# Patient Record
Sex: MALE | Race: WHITE | NOT HISPANIC OR LATINO | Employment: OTHER | URBAN - METROPOLITAN AREA
[De-identification: names, ages, dates, MRNs, and addresses within clinical notes are randomized per-mention and may not be internally consistent; named-entity substitution may affect disease eponyms.]

---

## 2023-03-27 ENCOUNTER — TELEPHONE (OUTPATIENT)
Dept: PRIMARY CARE | Facility: CLINIC | Age: 78
End: 2023-03-27
Payer: COMMERCIAL

## 2023-03-27 DIAGNOSIS — F41.1 GENERALIZED ANXIETY DISORDER: Primary | ICD-10-CM

## 2023-03-27 RX ORDER — CLONAZEPAM 0.5 MG/1
1 TABLET ORAL 2 TIMES DAILY
COMMUNITY
Start: 2019-04-26 | End: 2023-03-27 | Stop reason: SDUPTHER

## 2023-03-27 RX ORDER — CLONAZEPAM 0.5 MG/1
0.5 TABLET ORAL 2 TIMES DAILY
Qty: 60 TABLET | Refills: 0 | Status: SHIPPED | OUTPATIENT
Start: 2023-03-27 | End: 2023-04-26 | Stop reason: SDUPTHER

## 2023-04-26 DIAGNOSIS — F41.1 GENERALIZED ANXIETY DISORDER: ICD-10-CM

## 2023-04-26 RX ORDER — CLONAZEPAM 0.5 MG/1
0.5 TABLET ORAL 2 TIMES DAILY
Qty: 60 TABLET | Refills: 0 | Status: SHIPPED | OUTPATIENT
Start: 2023-04-26 | End: 2023-05-24 | Stop reason: SDUPTHER

## 2023-05-11 LAB
ALBUMIN (G/DL) IN SER/PLAS: 4 G/DL (ref 3.4–5)
ANION GAP IN SER/PLAS: 13 MMOL/L (ref 10–20)
CALCIUM (MG/DL) IN SER/PLAS: 9.6 MG/DL (ref 8.6–10.3)
CARBON DIOXIDE, TOTAL (MMOL/L) IN SER/PLAS: 29 MMOL/L (ref 21–32)
CHLORIDE (MMOL/L) IN SER/PLAS: 105 MMOL/L (ref 98–107)
CREATININE (MG/DL) IN SER/PLAS: 0.92 MG/DL (ref 0.5–1.3)
GFR MALE: 85 ML/MIN/1.73M2
GLUCOSE (MG/DL) IN SER/PLAS: 138 MG/DL (ref 74–99)
PHOSPHATE (MG/DL) IN SER/PLAS: 3.4 MG/DL (ref 2.5–4.9)
POTASSIUM (MMOL/L) IN SER/PLAS: 4.8 MMOL/L (ref 3.5–5.3)
SODIUM (MMOL/L) IN SER/PLAS: 142 MMOL/L (ref 136–145)
UREA NITROGEN (MG/DL) IN SER/PLAS: 13 MG/DL (ref 6–23)

## 2023-05-12 ENCOUNTER — OFFICE VISIT (OUTPATIENT)
Dept: PRIMARY CARE | Facility: CLINIC | Age: 78
End: 2023-05-12
Payer: COMMERCIAL

## 2023-05-12 VITALS
BODY MASS INDEX: 39.7 KG/M2 | HEIGHT: 66 IN | DIASTOLIC BLOOD PRESSURE: 77 MMHG | SYSTOLIC BLOOD PRESSURE: 122 MMHG | HEART RATE: 77 BPM | TEMPERATURE: 97.6 F | WEIGHT: 247 LBS

## 2023-05-12 DIAGNOSIS — E66.01 CLASS 2 SEVERE OBESITY DUE TO EXCESS CALORIES WITH SERIOUS COMORBIDITY AND BODY MASS INDEX (BMI) OF 39.0 TO 39.9 IN ADULT (MULTI): ICD-10-CM

## 2023-05-12 DIAGNOSIS — F41.1 GENERALIZED ANXIETY DISORDER: ICD-10-CM

## 2023-05-12 DIAGNOSIS — I25.10 ATHSCL HEART DISEASE OF NATIVE CORONARY ARTERY W/O ANG PCTRS: ICD-10-CM

## 2023-05-12 DIAGNOSIS — G40.909 NONINTRACTABLE EPILEPSY WITHOUT STATUS EPILEPTICUS, UNSPECIFIED EPILEPSY TYPE (MULTI): Primary | ICD-10-CM

## 2023-05-12 DIAGNOSIS — R60.0 BILATERAL LEG EDEMA: ICD-10-CM

## 2023-05-12 DIAGNOSIS — I10 BENIGN ESSENTIAL HYPERTENSION: ICD-10-CM

## 2023-05-12 PROBLEM — K21.9 GERD WITHOUT ESOPHAGITIS: Status: ACTIVE | Noted: 2023-05-12

## 2023-05-12 PROBLEM — E66.812 CLASS 2 SEVERE OBESITY DUE TO EXCESS CALORIES WITH SERIOUS COMORBIDITY AND BODY MASS INDEX (BMI) OF 39.0 TO 39.9 IN ADULT: Status: ACTIVE | Noted: 2023-05-12

## 2023-05-12 PROBLEM — Z98.61 CAD S/P PERCUTANEOUS CORONARY ANGIOPLASTY: Status: ACTIVE | Noted: 2023-05-12

## 2023-05-12 PROBLEM — N40.0 BENIGN PROSTATIC HYPERPLASIA: Status: ACTIVE | Noted: 2023-05-12

## 2023-05-12 PROCEDURE — 1036F TOBACCO NON-USER: CPT | Performed by: INTERNAL MEDICINE

## 2023-05-12 PROCEDURE — 3074F SYST BP LT 130 MM HG: CPT | Performed by: INTERNAL MEDICINE

## 2023-05-12 PROCEDURE — 1159F MED LIST DOCD IN RCRD: CPT | Performed by: INTERNAL MEDICINE

## 2023-05-12 PROCEDURE — 3078F DIAST BP <80 MM HG: CPT | Performed by: INTERNAL MEDICINE

## 2023-05-12 PROCEDURE — 99213 OFFICE O/P EST LOW 20 MIN: CPT | Performed by: INTERNAL MEDICINE

## 2023-05-12 PROCEDURE — 1160F RVW MEDS BY RX/DR IN RCRD: CPT | Performed by: INTERNAL MEDICINE

## 2023-05-12 RX ORDER — FUROSEMIDE 40 MG/1
1 TABLET ORAL DAILY
COMMUNITY
End: 2023-11-07 | Stop reason: ALTCHOICE

## 2023-05-12 RX ORDER — PHENYTOIN SODIUM EXTENDED 100 MG
1 CAPSULE ORAL 3 TIMES DAILY
COMMUNITY
Start: 2018-07-05 | End: 2023-11-27 | Stop reason: SDUPTHER

## 2023-05-12 RX ORDER — SIMVASTATIN 40 MG/1
1 TABLET, FILM COATED ORAL NIGHTLY
COMMUNITY
Start: 2018-06-26 | End: 2023-11-07 | Stop reason: ALTCHOICE

## 2023-05-12 RX ORDER — LOSARTAN POTASSIUM 50 MG/1
1 TABLET ORAL DAILY
COMMUNITY
Start: 2018-06-27 | End: 2023-11-27 | Stop reason: SDUPTHER

## 2023-05-12 RX ORDER — LANOLIN ALCOHOL/MO/W.PET/CERES
1 CREAM (GRAM) TOPICAL DAILY
COMMUNITY

## 2023-05-12 RX ORDER — NAPROXEN SODIUM 220 MG/1
81 TABLET, FILM COATED ORAL
COMMUNITY

## 2023-05-12 RX ORDER — EZETIMIBE 10 MG/1
1 TABLET ORAL DAILY
COMMUNITY
Start: 2018-06-12 | End: 2023-12-06 | Stop reason: SDUPTHER

## 2023-05-12 RX ORDER — FINASTERIDE 5 MG/1
1 TABLET, FILM COATED ORAL DAILY
COMMUNITY
Start: 2019-05-03 | End: 2023-11-07 | Stop reason: ALTCHOICE

## 2023-05-12 RX ORDER — ISOSORBIDE MONONITRATE 30 MG/1
1 TABLET, EXTENDED RELEASE ORAL DAILY
COMMUNITY
Start: 2018-07-16 | End: 2023-07-07 | Stop reason: SDUPTHER

## 2023-05-12 RX ORDER — CLOPIDOGREL BISULFATE 75 MG/1
75 TABLET ORAL
COMMUNITY
End: 2023-11-24 | Stop reason: SDUPTHER

## 2023-05-12 RX ORDER — VIT C/E/ZN/COPPR/LUTEIN/ZEAXAN 250MG-90MG
25 CAPSULE ORAL DAILY
Qty: 30 CAPSULE | Refills: 11 | Status: SHIPPED | OUTPATIENT
Start: 2023-05-12 | End: 2023-06-12 | Stop reason: SDUPTHER

## 2023-05-12 ASSESSMENT — ENCOUNTER SYMPTOMS
OCCASIONAL FEELINGS OF UNSTEADINESS: 0
HEADACHES: 0
ABDOMINAL PAIN: 0
NECK PAIN: 0
DEPRESSION: 0
DIZZINESS: 0
NEUROLOGICAL NEGATIVE: 1
COMPULSIONS: 1
VOMITING: 0
GASTROINTESTINAL NEGATIVE: 1
RESTLESSNESS: 0
ARTHRALGIAS: 1
NERVOUS/ANXIOUS: 0
LOSS OF SENSATION IN FEET: 0
SHORTNESS OF BREATH: 0
IRRITABILITY: 0
RESPIRATORY NEGATIVE: 1
DEPRESSED MOOD: 0
ANOREXIA: 0
FEELING OF CHOKING: 0
EYES NEGATIVE: 1
CONSTITUTIONAL NEGATIVE: 1

## 2023-05-12 NOTE — PROGRESS NOTES
Patient was identified as a fall risk. Risk prevention instructions provided.Subjective   Patient ID: Lalo Antony is a 77 y.o. male who presents for Follow-up (Follow up had blood work).    Heart Problem  This is a chronic problem. The current episode started more than 1 year ago. The problem occurs intermittently. The problem has been gradually improving. Associated symptoms include arthralgias. Pertinent negatives include no abdominal pain, anorexia, chest pain, congestion, headaches, neck pain or vomiting. The treatment provided moderate relief.   Anxiety  Presents for follow-up visit. Symptoms include compulsions. Patient reports no chest pain, depressed mood, dizziness, dry mouth, feeling of choking, irritability, nervous/anxious behavior, restlessness or shortness of breath. Symptoms occur rarely. The patient sleeps 7 hours per night. The quality of sleep is fair. Nighttime awakenings: occasional.     Compliance with medications is %.      OARRS:  No data recorded  I have personally reviewed the OARRS report for Lalo Antony. I have considered the risks of abuse, dependence, addiction and diversion    Is the patient prescribed a combination of a benzodiazepine and opioid?  No    Last Urine Drug Screen / ordered today: Yes  Recent Results (from the past 93649 hour(s))   Drug Screen, Urine With Reflex to Confirmation    Collection Time: 09/23/22  9:44 AM   Result Value Ref Range    DRUG SCREEN COMMENT URINE SEE BELOW     Amphetamine Screen, Urine PRESUMPTIVE NEGATIVE NEGATIVE    Barbiturate Screen, Urine PRESUMPTIVE NEGATIVE NEGATIVE    BENZODIAZEPINE (PRESENCE) IN URINE BY SCREEN METHOD PRESUMPTIVE NEGATIVE NEGATIVE    Cannabinoid Screen, Urine PRESUMPTIVE NEGATIVE NEGATIVE    Cocaine Screen, Urine PRESUMPTIVE NEGATIVE NEGATIVE    Fentanyl, Ur PRESUMPTIVE NEGATIVE NEGATIVE    Methadone Screen, Urine PRESUMPTIVE NEGATIVE NEGATIVE    Opiate Screen, Urine PRESUMPTIVE NEGATIVE NEGATIVE    Oxycodone  "Screen, Ur PRESUMPTIVE NEGATIVE NEGATIVE    PCP Screen, Urine PRESUMPTIVE NEGATIVE NEGATIVE     Results are as expected.     Controlled Substance Agreement:  Date of the Last Agreement: 6/22/2022  Reviewed Controlled Substance Agreement including but not limited to the benefits, risks, and alternatives to treatment with a Controlled Substance medication(s).    Benzodiazepines:  What is the patient's goal of therapy? Less anxiety  Is this being achieved with current treatment? Yes and better    CHARLETTE-7:  No data recorded    Activities of Daily Living:   Is your overall impression that this patient is benefiting (symptom reduction outweighs side effects) from benzodiazepine therapy? Yes     1. Physical Functioning: Same  2. Family Relationship: Better  3. Social Relationship: Better  4. Mood: Better  5. Sleep Patterns: Same  6. Overall Function: Better  Review of Systems   Constitutional: Negative.  Negative for irritability.   HENT: Negative.  Negative for congestion.    Eyes: Negative.    Respiratory: Negative.  Negative for shortness of breath.    Cardiovascular:  Positive for leg swelling. Negative for chest pain.   Gastrointestinal: Negative.  Negative for abdominal pain, anorexia and vomiting.   Musculoskeletal:  Positive for arthralgias. Negative for neck pain.   Skin: Negative.    Neurological: Negative.  Negative for dizziness and headaches.   Psychiatric/Behavioral:  The patient is not nervous/anxious.        Objective   /77 (BP Location: Left arm, Patient Position: Sitting, BP Cuff Size: Adult)   Pulse 77   Temp 36.4 °C (97.6 °F) (Temporal)   Ht 1.676 m (5' 6\")   Wt 112 kg (247 lb)   BMI 39.87 kg/m²     Physical Exam  Vitals reviewed.   Constitutional:       Appearance: Normal appearance. He is obese.   HENT:      Head: Normocephalic and atraumatic.   Eyes:      Conjunctiva/sclera: Conjunctivae normal.   Cardiovascular:      Rate and Rhythm: Normal rate and regular rhythm.      Pulses: Normal " pulses.   Pulmonary:      Effort: Pulmonary effort is normal.      Breath sounds: Normal breath sounds.   Abdominal:      Palpations: Abdomen is soft.   Musculoskeletal:         General: Normal range of motion.      Cervical back: Normal range of motion.      Right lower leg: Edema present.      Left lower leg: Edema present.   Skin:     General: Skin is warm and dry.   Neurological:      General: No focal deficit present.   Psychiatric:         Mood and Affect: Mood normal.       Assessment/Plan   Problem List Items Addressed This Visit          Nervous    Epilepsy (CMS/HCC) - Primary    Relevant Medications    cholecalciferol (Vitamin D3) 25 MCG (1000 UT) capsule       Circulatory    Athscl heart disease of native coronary artery w/o ang pctrs    Relevant Medications    clopidogrel (Plavix) 75 mg tablet    isosorbide mononitrate ER (Imdur) 30 mg 24 hr tablet    Benign essential hypertension       Musculoskeletal    Bilateral leg edema       Other    Generalized anxiety disorder   OARRS reviewed, UDS has been done on annual basis. Rx patten and usage reviewed, adverse reactions to controlled substance and risk of habituality and addiction and overdose and death explained, pt has been explained about [facility] controlled substance policy and mandates and follow ups. Controlled substance agreement has been reviewed and renewed annually. Please keep bottles containing controlled substance medications away from children and in safe area, do not share this Rx to anyone, noncompliance will lead to cessation of therapy. So far patient has been doing well and shows compliance.  Pt does not have any angina lately, aware of using NTG if needed, aware to notify MD if any new chest pains happens. Compliance is appropriate. Statin therapy reviewed,  Anxiety is very common and debilitating disorder and it can affect QOL and our daily routine, Rx was reviewed. Appropriate adjustment of therapy has been discussed,  proper nutritious and scheduled eating, mindfulness, scheduled sleep routine and avoidance of usage of smart devices at night and social media can be helpful. Light exercises, meditation and keeping in close contact with family members and close friends can be helpful, if feeling anxious then please calm down, take a deep breath and relax. Continue to take medications as ordered and always we can consider mental health counselling. Please feel free to contact us in needful basis.  He did not do well with venlafaxine and buspirone so he is back on clonazepam treatment, he is on twice a day clonazepam, he has been having this intractable swelling of lower extremities, he contributes that from the fall from the ladder when he has a hematoma on the right leg, he was seen by cardiology, he is echocardiography shows appropriate ejection fraction, he has a carotid Dopplers done, all the reports were reviewed, Holter was reviewed, there was no arrhythmias or bradycardia arrhythmias seen.  As he has a significant and nonpitting edema of the both lower legs right more than left he needs a venous duplex, my suspicion for DVT is very low, his anxiety has been in remission, wife was present today, he has no angina, clonazepam treatment has been kept under the control of wife, controlled substance policy and procedure has been reviewed, he has a visceral obesity, medications are reviewed, he has a PCI in the recent past, nitrates will be continued, excessive vasodilators has been avoided, set of laboratories will be done in the future, follow-up in 3 months.

## 2023-05-12 NOTE — PATIENT INSTRUCTIONS

## 2023-05-24 ENCOUNTER — TELEPHONE (OUTPATIENT)
Dept: PRIMARY CARE | Facility: CLINIC | Age: 78
End: 2023-05-24
Payer: COMMERCIAL

## 2023-05-24 DIAGNOSIS — F41.1 GENERALIZED ANXIETY DISORDER: ICD-10-CM

## 2023-05-24 RX ORDER — CLONAZEPAM 0.5 MG/1
0.5 TABLET ORAL 2 TIMES DAILY
Qty: 60 TABLET | Refills: 0 | Status: SHIPPED | OUTPATIENT
Start: 2023-05-24 | End: 2023-06-22 | Stop reason: SDUPTHER

## 2023-06-12 DIAGNOSIS — G40.909 NONINTRACTABLE EPILEPSY WITHOUT STATUS EPILEPTICUS, UNSPECIFIED EPILEPSY TYPE (MULTI): ICD-10-CM

## 2023-06-12 RX ORDER — VIT C/E/ZN/COPPR/LUTEIN/ZEAXAN 250MG-90MG
25 CAPSULE ORAL DAILY
Qty: 90 CAPSULE | Refills: 3 | Status: SHIPPED | OUTPATIENT
Start: 2023-06-12 | End: 2024-06-11

## 2023-06-22 DIAGNOSIS — F41.1 GENERALIZED ANXIETY DISORDER: ICD-10-CM

## 2023-06-22 RX ORDER — CLONAZEPAM 0.5 MG/1
0.5 TABLET ORAL 2 TIMES DAILY
Qty: 60 TABLET | Refills: 0 | Status: SHIPPED | OUTPATIENT
Start: 2023-06-22 | End: 2023-07-20 | Stop reason: SDUPTHER

## 2023-07-07 DIAGNOSIS — I10 BENIGN ESSENTIAL HYPERTENSION: ICD-10-CM

## 2023-07-07 RX ORDER — ISOSORBIDE MONONITRATE 30 MG/1
30 TABLET, EXTENDED RELEASE ORAL DAILY
Qty: 90 TABLET | Refills: 3 | Status: SHIPPED | OUTPATIENT
Start: 2023-07-07 | End: 2023-07-07

## 2023-07-20 DIAGNOSIS — F41.1 GENERALIZED ANXIETY DISORDER: ICD-10-CM

## 2023-07-20 RX ORDER — CLONAZEPAM 0.5 MG/1
0.5 TABLET ORAL 2 TIMES DAILY
Qty: 60 TABLET | Refills: 0 | Status: SHIPPED | OUTPATIENT
Start: 2023-07-20 | End: 2023-08-18 | Stop reason: SDUPTHER

## 2023-08-14 ENCOUNTER — LAB (OUTPATIENT)
Dept: LAB | Facility: LAB | Age: 78
End: 2023-08-14
Payer: COMMERCIAL

## 2023-08-14 ENCOUNTER — OFFICE VISIT (OUTPATIENT)
Dept: PRIMARY CARE | Facility: CLINIC | Age: 78
End: 2023-08-14
Payer: COMMERCIAL

## 2023-08-14 VITALS
SYSTOLIC BLOOD PRESSURE: 120 MMHG | WEIGHT: 238 LBS | BODY MASS INDEX: 39.65 KG/M2 | DIASTOLIC BLOOD PRESSURE: 80 MMHG | HEIGHT: 65 IN | TEMPERATURE: 96.6 F | HEART RATE: 66 BPM

## 2023-08-14 DIAGNOSIS — F41.1 GENERALIZED ANXIETY DISORDER: ICD-10-CM

## 2023-08-14 DIAGNOSIS — Z12.5 PROSTATE CANCER SCREENING: ICD-10-CM

## 2023-08-14 DIAGNOSIS — F13.10: ICD-10-CM

## 2023-08-14 DIAGNOSIS — R60.0 FLUID RETENTION IN LEGS: ICD-10-CM

## 2023-08-14 DIAGNOSIS — I10 BENIGN ESSENTIAL HYPERTENSION: ICD-10-CM

## 2023-08-14 DIAGNOSIS — I25.10 ATHSCL HEART DISEASE OF NATIVE CORONARY ARTERY W/O ANG PCTRS: Primary | ICD-10-CM

## 2023-08-14 DIAGNOSIS — K21.9 GERD WITHOUT ESOPHAGITIS: ICD-10-CM

## 2023-08-14 LAB
AMPHETAMINE (PRESENCE) IN URINE BY SCREEN METHOD: NORMAL
BARBITURATES PRESENCE IN URINE BY SCREEN METHOD: NORMAL
BENZODIAZEPINE (PRESENCE) IN URINE BY SCREEN METHOD: NORMAL
CANNABINOIDS IN URINE BY SCREEN METHOD: NORMAL
COCAINE (PRESENCE) IN URINE BY SCREEN METHOD: NORMAL
DRUG SCREEN COMMENT URINE: NORMAL
FENTANYL URINE: NORMAL
METHADONE (PRESENCE) IN URINE BY SCREEN METHOD: NORMAL
OPIATES (PRESENCE) IN URINE BY SCREEN METHOD: NORMAL
OXYCODONE (PRESENCE) IN URINE BY SCREEN METHOD: NORMAL
PHENCYCLIDINE (PRESENCE) IN URINE BY SCREEN METHOD: NORMAL

## 2023-08-14 PROCEDURE — 1160F RVW MEDS BY RX/DR IN RCRD: CPT | Performed by: INTERNAL MEDICINE

## 2023-08-14 PROCEDURE — 1036F TOBACCO NON-USER: CPT | Performed by: INTERNAL MEDICINE

## 2023-08-14 PROCEDURE — 80307 DRUG TEST PRSMV CHEM ANLYZR: CPT

## 2023-08-14 PROCEDURE — 1126F AMNT PAIN NOTED NONE PRSNT: CPT | Performed by: INTERNAL MEDICINE

## 2023-08-14 PROCEDURE — 3079F DIAST BP 80-89 MM HG: CPT | Performed by: INTERNAL MEDICINE

## 2023-08-14 PROCEDURE — 3074F SYST BP LT 130 MM HG: CPT | Performed by: INTERNAL MEDICINE

## 2023-08-14 PROCEDURE — 1159F MED LIST DOCD IN RCRD: CPT | Performed by: INTERNAL MEDICINE

## 2023-08-14 PROCEDURE — 99213 OFFICE O/P EST LOW 20 MIN: CPT | Performed by: INTERNAL MEDICINE

## 2023-08-14 ASSESSMENT — ENCOUNTER SYMPTOMS
DEPRESSED MOOD: 0
NEUROLOGICAL NEGATIVE: 1
CHANGE IN BOWEL HABIT: 0
AGITATION: 0
VOMITING: 0
RESPIRATORY NEGATIVE: 1
NAUSEA: 0
SHORTNESS OF BREATH: 0
GASTROINTESTINAL NEGATIVE: 1
MUSCULOSKELETAL NEGATIVE: 1
HYPERTENSION: 1
ABDOMINAL PAIN: 0
ARTHRALGIAS: 0
ANOREXIA: 0
DIZZINESS: 0
DECREASED CONCENTRATION: 0
CONFUSION: 0
FATIGUE: 0
WEAKNESS: 0
CONSTITUTIONAL NEGATIVE: 1
DIAPHORESIS: 0
EYES NEGATIVE: 1

## 2023-08-14 NOTE — PROGRESS NOTES
Subjective   Patient ID: Lalo Antony is a 77 y.o. male who presents for Follow-up (3 mo fu ).    Heart Problem  This is a chronic problem. The current episode started more than 1 year ago. The problem occurs intermittently. The problem has been unchanged. Pertinent negatives include no abdominal pain, anorexia, arthralgias, change in bowel habit, chest pain, diaphoresis, fatigue, nausea, vomiting or weakness. The treatment provided significant relief.   Anxiety  Presents for follow-up visit. Patient reports no chest pain, confusion, decreased concentration, depressed mood, dizziness, dry mouth, excessive worry, nausea or shortness of breath. Symptoms occur occasionally. The quality of sleep is fair.       Hypertension  This is a chronic problem. The current episode started more than 1 year ago. The problem has been waxing and waning since onset. The problem is controlled. Associated symptoms include anxiety. Pertinent negatives include no chest pain or shortness of breath. Past treatments include beta blockers and calcium channel blockers. The current treatment provides moderate improvement. There are no compliance problems.  Hypertensive end-organ damage includes CAD/MI.    OARRS:  No data recorded  I have personally reviewed the OARRS report for Lalo Anotny. I have considered the risks of abuse, dependence, addiction and diversion    Is the patient prescribed a combination of a benzodiazepine and opioid?  No    Last Urine Drug Screen / ordered today: Yes  Recent Results (from the past 26345 hour(s))   Drug Screen, Urine With Reflex to Confirmation    Collection Time: 09/23/22  9:44 AM   Result Value Ref Range    DRUG SCREEN COMMENT URINE SEE BELOW     Amphetamine Screen, Urine PRESUMPTIVE NEGATIVE NEGATIVE    Barbiturate Screen, Urine PRESUMPTIVE NEGATIVE NEGATIVE    BENZODIAZEPINE (PRESENCE) IN URINE BY SCREEN METHOD PRESUMPTIVE NEGATIVE NEGATIVE    Cannabinoid Screen, Urine PRESUMPTIVE NEGATIVE  "NEGATIVE    Cocaine Screen, Urine PRESUMPTIVE NEGATIVE NEGATIVE    Fentanyl, Ur PRESUMPTIVE NEGATIVE NEGATIVE    Methadone Screen, Urine PRESUMPTIVE NEGATIVE NEGATIVE    Opiate Screen, Urine PRESUMPTIVE NEGATIVE NEGATIVE    Oxycodone Screen, Ur PRESUMPTIVE NEGATIVE NEGATIVE    PCP Screen, Urine PRESUMPTIVE NEGATIVE NEGATIVE     Results are as expected.     Controlled Substance Agreement:  Date of the Last Agreement: 8/14/2023  Reviewed Controlled Substance Agreement including but not limited to the benefits, risks, and alternatives to treatment with a Controlled Substance medication(s).    Benzodiazepines:  What is the patient's goal of therapy? Less anxiety  Is this being achieved with current treatment? yes    CHARLETTE-7:  No data recorded    Activities of Daily Living:   Is your overall impression that this patient is benefiting (symptom reduction outweighs side effects) from benzodiazepine therapy? Yes     1. Physical Functioning: Same  2. Family Relationship: Same  3. Social Relationship: Same  4. Mood: Better  5. Sleep Patterns: Better  6. Overall Function: Better    Review of Systems   Constitutional: Negative.  Negative for diaphoresis and fatigue.   HENT: Negative.     Eyes: Negative.    Respiratory: Negative.  Negative for shortness of breath.    Cardiovascular:  Positive for leg swelling. Negative for chest pain.   Gastrointestinal: Negative.  Negative for abdominal pain, anorexia, change in bowel habit, nausea and vomiting.   Musculoskeletal: Negative.  Negative for arthralgias.   Skin: Negative.    Neurological: Negative.  Negative for dizziness and weakness.   Psychiatric/Behavioral:  Negative for agitation, confusion and decreased concentration.        Objective   /80 (BP Location: Right arm, Patient Position: Sitting, BP Cuff Size: Adult)   Pulse 66   Temp 35.9 °C (96.6 °F) (Temporal)   Ht 1.638 m (5' 4.5\")   Wt 108 kg (238 lb)   BMI 40.22 kg/m²     Physical Exam  Vitals reviewed. "   Constitutional:       Appearance: Normal appearance. He is normal weight.   HENT:      Head: Normocephalic and atraumatic.   Eyes:      Conjunctiva/sclera: Conjunctivae normal.   Cardiovascular:      Rate and Rhythm: Normal rate and regular rhythm.      Pulses: Normal pulses.   Pulmonary:      Effort: Pulmonary effort is normal.      Breath sounds: Normal breath sounds.   Abdominal:      Palpations: Abdomen is soft.   Musculoskeletal:         General: Swelling present. No tenderness or deformity.      Cervical back: Normal range of motion.      Right lower leg: Edema present.      Left lower leg: Edema present.   Skin:     General: Skin is warm and dry.   Neurological:      General: No focal deficit present.   Psychiatric:         Mood and Affect: Mood normal.         Assessment/Plan   Problem List Items Addressed This Visit       Athscl heart disease of native coronary artery w/o ang pctrs - Primary    Benign essential hypertension    Generalized anxiety disorder    GERD without esophagitis     Other Visit Diagnoses       Fluid retention in legs            OARRS reviewed, UDS has been done on annual basis. Rx patten and usage reviewed, adverse reactions to controlled substance and risk of habituality and addiction and overdose and death explained, pt has been explained about [facility] controlled substance policy and mandates and follow ups. Controlled substance agreement has been reviewed and renewed annually. Please keep bottles containing controlled substance medications away from children and in safe area, do not share this Rx to anyone, noncompliance will lead to cessation of therapy. So far patient has been doing well and shows compliance.  Pt does not have any angina lately, aware of using NTG if needed, aware to notify MD if any new chest pains happens. Compliance is appropriate. Statin therapy reviewed, last time we did a venous duplex and they were talking about complex fluid collection in the  right cough, I do not see any significant clinical evidence of swelling or fluid collection or any abscess-like situation but we will repeat venous duplex to see that this fluid collection has gone down, it was nonspecific, it was not infectious.  Meanwhile he has been doing well, he remains on clonazepam therapy twice a day, time to have a controlled substance renewal, time to have a urine drug screening.  No angina, no ER visits, he has a 13 PCI's, BP readings are stable, no epilepsy, no abdominal pain, overall his clinical condition and mental state of mind is stable, list of medications were reviewed, he remains on chronic Dilantin therapy, laboratories next time, follow-up in 3 months.

## 2023-08-18 DIAGNOSIS — F41.1 GENERALIZED ANXIETY DISORDER: ICD-10-CM

## 2023-08-18 RX ORDER — CLONAZEPAM 0.5 MG/1
0.5 TABLET ORAL 2 TIMES DAILY
Qty: 60 TABLET | Refills: 0 | Status: SHIPPED | OUTPATIENT
Start: 2023-08-18 | End: 2023-09-15 | Stop reason: SDUPTHER

## 2023-09-15 DIAGNOSIS — F41.1 GENERALIZED ANXIETY DISORDER: ICD-10-CM

## 2023-09-15 RX ORDER — CLONAZEPAM 0.5 MG/1
0.5 TABLET ORAL 2 TIMES DAILY
Qty: 60 TABLET | Refills: 0 | Status: SHIPPED | OUTPATIENT
Start: 2023-09-15 | End: 2023-09-15

## 2023-10-07 ENCOUNTER — PHARMACY VISIT (OUTPATIENT)
Dept: PHARMACY | Facility: CLINIC | Age: 78
End: 2023-10-07
Payer: COMMERCIAL

## 2023-10-07 PROCEDURE — RXMED WILLOW AMBULATORY MEDICATION CHARGE

## 2023-10-16 ENCOUNTER — PHARMACY VISIT (OUTPATIENT)
Dept: PHARMACY | Facility: CLINIC | Age: 78
End: 2023-10-16
Payer: COMMERCIAL

## 2023-10-16 DIAGNOSIS — F41.1 GENERALIZED ANXIETY DISORDER: ICD-10-CM

## 2023-10-16 PROCEDURE — RXMED WILLOW AMBULATORY MEDICATION CHARGE

## 2023-10-16 RX ORDER — CLONAZEPAM 0.5 MG/1
0.5 TABLET ORAL 2 TIMES DAILY
Qty: 60 TABLET | Refills: 0 | Status: SHIPPED | OUTPATIENT
Start: 2023-10-16 | End: 2023-11-16 | Stop reason: SDUPTHER

## 2023-10-27 ENCOUNTER — PHARMACY VISIT (OUTPATIENT)
Dept: PHARMACY | Facility: CLINIC | Age: 78
End: 2023-10-27
Payer: COMMERCIAL

## 2023-10-27 PROCEDURE — RXMED WILLOW AMBULATORY MEDICATION CHARGE

## 2023-11-05 PROBLEM — E78.2 HYPERLIPIDEMIA, MIXED: Status: ACTIVE | Noted: 2023-11-05

## 2023-11-05 PROBLEM — E55.9 VITAMIN D DEFICIENCY: Status: ACTIVE | Noted: 2023-11-05

## 2023-11-05 RX ORDER — BUSPIRONE HYDROCHLORIDE 7.5 MG/1
7.5 TABLET ORAL
COMMUNITY
Start: 2023-03-07 | End: 2023-11-07 | Stop reason: ALTCHOICE

## 2023-11-05 RX ORDER — FUROSEMIDE 20 MG/1
20 TABLET ORAL
COMMUNITY
Start: 2023-02-13 | End: 2023-11-07 | Stop reason: ALTCHOICE

## 2023-11-05 RX ORDER — VENLAFAXINE HYDROCHLORIDE 75 MG/1
75 CAPSULE, EXTENDED RELEASE ORAL
COMMUNITY
Start: 2023-03-03 | End: 2023-11-07 | Stop reason: ALTCHOICE

## 2023-11-05 RX ORDER — VENLAFAXINE HYDROCHLORIDE 37.5 MG/1
37.5 CAPSULE, EXTENDED RELEASE ORAL
COMMUNITY
Start: 2023-02-09 | End: 2023-11-07 | Stop reason: ALTCHOICE

## 2023-11-07 ENCOUNTER — OFFICE VISIT (OUTPATIENT)
Dept: CARDIOLOGY | Facility: CLINIC | Age: 78
End: 2023-11-07
Payer: COMMERCIAL

## 2023-11-07 ENCOUNTER — LAB (OUTPATIENT)
Dept: LAB | Facility: LAB | Age: 78
End: 2023-11-07
Payer: COMMERCIAL

## 2023-11-07 VITALS
DIASTOLIC BLOOD PRESSURE: 78 MMHG | SYSTOLIC BLOOD PRESSURE: 132 MMHG | WEIGHT: 228.3 LBS | HEART RATE: 67 BPM | HEIGHT: 67 IN | BODY MASS INDEX: 35.83 KG/M2

## 2023-11-07 DIAGNOSIS — Z12.5 PROSTATE CANCER SCREENING: ICD-10-CM

## 2023-11-07 DIAGNOSIS — K21.9 GERD WITHOUT ESOPHAGITIS: ICD-10-CM

## 2023-11-07 DIAGNOSIS — Z87.891 FORMER SMOKER: ICD-10-CM

## 2023-11-07 DIAGNOSIS — I10 BENIGN ESSENTIAL HYPERTENSION: ICD-10-CM

## 2023-11-07 DIAGNOSIS — E78.2 HYPERLIPIDEMIA, MIXED: ICD-10-CM

## 2023-11-07 DIAGNOSIS — I25.10 ATHSCL HEART DISEASE OF NATIVE CORONARY ARTERY W/O ANG PCTRS: ICD-10-CM

## 2023-11-07 DIAGNOSIS — I25.10 CAD S/P PERCUTANEOUS CORONARY ANGIOPLASTY: ICD-10-CM

## 2023-11-07 DIAGNOSIS — N40.0 BENIGN PROSTATIC HYPERPLASIA, UNSPECIFIED WHETHER LOWER URINARY TRACT SYMPTOMS PRESENT: ICD-10-CM

## 2023-11-07 DIAGNOSIS — Z98.61 CAD S/P PERCUTANEOUS CORONARY ANGIOPLASTY: ICD-10-CM

## 2023-11-07 LAB
ALBUMIN SERPL BCP-MCNC: 4.3 G/DL (ref 3.4–5)
ALP SERPL-CCNC: 57 U/L (ref 33–136)
ALT SERPL W P-5'-P-CCNC: 10 U/L (ref 10–52)
ANION GAP SERPL CALC-SCNC: 12 MMOL/L (ref 10–20)
AST SERPL W P-5'-P-CCNC: 14 U/L (ref 9–39)
BILIRUB SERPL-MCNC: 0.6 MG/DL (ref 0–1.2)
BUN SERPL-MCNC: 13 MG/DL (ref 6–23)
CALCIUM SERPL-MCNC: 10.1 MG/DL (ref 8.6–10.3)
CHLORIDE SERPL-SCNC: 104 MMOL/L (ref 98–107)
CHOLEST SERPL-MCNC: 178 MG/DL (ref 0–199)
CHOLESTEROL/HDL RATIO: 3.2
CO2 SERPL-SCNC: 32 MMOL/L (ref 21–32)
CREAT SERPL-MCNC: 0.99 MG/DL (ref 0.5–1.3)
GFR SERPL CREATININE-BSD FRML MDRD: 78 ML/MIN/1.73M*2
GLUCOSE SERPL-MCNC: 138 MG/DL (ref 74–99)
HDLC SERPL-MCNC: 56 MG/DL
LDLC SERPL CALC-MCNC: 100 MG/DL
NON HDL CHOLESTEROL: 122 MG/DL (ref 0–149)
POTASSIUM SERPL-SCNC: 4.5 MMOL/L (ref 3.5–5.3)
PROT SERPL-MCNC: 6.7 G/DL (ref 6.4–8.2)
PSA SERPL-MCNC: 1.56 NG/ML
SODIUM SERPL-SCNC: 143 MMOL/L (ref 136–145)
TRIGL SERPL-MCNC: 111 MG/DL (ref 0–149)
VLDL: 22 MG/DL (ref 0–40)

## 2023-11-07 PROCEDURE — 1160F RVW MEDS BY RX/DR IN RCRD: CPT | Performed by: INTERNAL MEDICINE

## 2023-11-07 PROCEDURE — 1159F MED LIST DOCD IN RCRD: CPT | Performed by: INTERNAL MEDICINE

## 2023-11-07 PROCEDURE — 99213 OFFICE O/P EST LOW 20 MIN: CPT | Performed by: INTERNAL MEDICINE

## 2023-11-07 PROCEDURE — 1036F TOBACCO NON-USER: CPT | Performed by: INTERNAL MEDICINE

## 2023-11-07 PROCEDURE — 84153 ASSAY OF PSA TOTAL: CPT

## 2023-11-07 PROCEDURE — 3075F SYST BP GE 130 - 139MM HG: CPT | Performed by: INTERNAL MEDICINE

## 2023-11-07 PROCEDURE — 1126F AMNT PAIN NOTED NONE PRSNT: CPT | Performed by: INTERNAL MEDICINE

## 2023-11-07 PROCEDURE — 80061 LIPID PANEL: CPT

## 2023-11-07 PROCEDURE — 36415 COLL VENOUS BLD VENIPUNCTURE: CPT

## 2023-11-07 PROCEDURE — 3078F DIAST BP <80 MM HG: CPT | Performed by: INTERNAL MEDICINE

## 2023-11-07 PROCEDURE — 80053 COMPREHEN METABOLIC PANEL: CPT

## 2023-11-07 NOTE — PROGRESS NOTES
Referred by Dr. Magdaleno ref. provider found provider found for   Chief Complaint   Patient presents with    Follow-up     6 month        History of Present Illness  Lalo Antony is a 77 y.o. year old male patient with history of coronary artery disease status post remote coronary angioplasty with stent [place] remote history of pericardial effusion resolved.  He has been doing well from a cardiac standpoint no complaint no symptoms of chest pain or shortness of breath.  Scheduled to have lab work by his primary care physician.  Has been taking his medication with no difficulty.  Blood pressure is adequately controlled.  I discussed with the patient in length we will continue current medical management he is to follow-up with his primary care physician.  He will see me back in 1 year    Past Medical History  History reviewed. No pertinent past medical history.    Social History  Social History     Tobacco Use    Smoking status: Former     Types: Cigarettes     Quit date: 1990     Years since quittin.8    Smokeless tobacco: Never   Vaping Use    Vaping Use: Never used   Substance Use Topics    Alcohol use: Not Currently    Drug use: Never       Family History     Family History   Problem Relation Name Age of Onset    Lung cancer Mother      Cancer Mother      Coronary artery disease Father      Heart attack Father      Skin cancer Son         Review of Systems  As per HPI, all other systems reviewed and negative.    Allergies:  Allergies   Allergen Reactions    Beta-Blockers (Beta-Adrenergic Blocking Agts) Unknown    Rosuvastatin Rash and Swelling     severe muscle pain        Outpatient Medications:  Current Outpatient Medications   Medication Instructions    aspirin 81 mg, oral, Daily RT    cholecalciferol (VITAMIN D3) 25 mcg, oral, Daily    clonazePAM (KlonoPIN) 0.5 mg tablet TAKE 1 TABLET BY MOUTH TWO TIMES A DAY    clopidogrel (PLAVIX) 75 mg, oral, Daily RT    Dilantin KapseaL 100 mg capsule 1 capsule,  oral, 3 times daily    ezetimibe (Zetia) 10 mg tablet 1 tablet, oral, Daily    furosemide (Lasix) 40 mg tablet TAKE 1 TABLET BY MOUTH ONCE DAILY    isosorbide mononitrate ER (Imdur) 30 mg 24 hr tablet TAKE 1 TABLET BY MOUTH ONCE DAILY    losartan (Cozaar) 50 mg tablet 1 tablet, oral, Daily    magnesium oxide (Mag-Ox) 400 mg (241.3 mg magnesium) tablet 1 tablet, oral, Daily    nitroglycerin (Nitrostat) 0.4 mg SL tablet DISSOLVE 1 TABLET UNDER TONGUE EVERY 5 MINUTES FOR UP TO 3 DOSES AS NEEDED FOR CHEST PAIN. IF PAIN PERSISTS DIAL 911 OR GO TO ER.    simvastatin (Zocor) 40 mg tablet TAKE 1 TABLET BY MOUTH EVERY NIGHT AT BEDTIME         Vitals:  Vitals:    11/07/23 0803   BP: 132/78   Pulse: 67       Physical Exam:  Physical Exam  Constitutional:       Appearance: Normal appearance.   HENT:      Head: Normocephalic and atraumatic.   Eyes:      Extraocular Movements: Extraocular movements intact.      Pupils: Pupils are equal, round, and reactive to light.   Cardiovascular:      Rate and Rhythm: Normal rate and regular rhythm.      Pulses: Normal pulses.      Heart sounds: Normal heart sounds.   Pulmonary:      Effort: Pulmonary effort is normal.      Breath sounds: Normal breath sounds.   Abdominal:      General: Abdomen is flat.      Palpations: Abdomen is soft.   Musculoskeletal:      Right lower leg: No edema.      Left lower leg: No edema.   Skin:     General: Skin is warm and dry.   Neurological:      General: No focal deficit present.      Mental Status: He is alert and oriented to person, place, and time.             Assessment/Plan   Diagnoses and all orders for this visit:  CAD S/P percutaneous coronary angioplasty  Hyperlipidemia, mixed  Benign essential hypertension  Benign prostatic hyperplasia, unspecified whether lower urinary tract symptoms present  GERD without esophagitis  BMI 35.0-35.9,adult  Former smoker          Jeanmarie Cooper MD [facility]  Interventional Cardiology   of [facility]      Thank you for allowing me to participate in the care of this patient. Please do not hesitate to contact me with any further questions or concerns.

## 2023-11-07 NOTE — PATIENT INSTRUCTIONS
Patient to follow up in 9 months with Dr. Jeanmarie Cooper MD [facility]    No changes to plan today.   Continue same medications and treatments.   Patient educated on proper medication use.   Patient educated on risk factor modification.   Please bring any lab results from other providers / physicians to your next appointment.     Please bring all medicines, vitamins, and herbal supplements with you when you come to the office.     Prescriptions will not be filled unless you are compliant with your follow up appointments or have a follow up appointment scheduled as per instruction of your physician. Refills should be requested at the time of your visit.    Rishi NO RN am scribing for and in the presence of Dr. Jeanmarie Cooper MD [facility]

## 2023-11-16 ENCOUNTER — OFFICE VISIT (OUTPATIENT)
Dept: PRIMARY CARE | Facility: CLINIC | Age: 78
End: 2023-11-16
Payer: COMMERCIAL

## 2023-11-16 ENCOUNTER — PHARMACY VISIT (OUTPATIENT)
Dept: PHARMACY | Facility: CLINIC | Age: 78
End: 2023-11-16
Payer: COMMERCIAL

## 2023-11-16 VITALS
HEART RATE: 67 BPM | BODY MASS INDEX: 35.79 KG/M2 | SYSTOLIC BLOOD PRESSURE: 128 MMHG | WEIGHT: 228 LBS | DIASTOLIC BLOOD PRESSURE: 79 MMHG | TEMPERATURE: 97.8 F | HEIGHT: 67 IN

## 2023-11-16 DIAGNOSIS — I10 BENIGN ESSENTIAL HYPERTENSION: Primary | ICD-10-CM

## 2023-11-16 DIAGNOSIS — I25.10 CAD S/P PERCUTANEOUS CORONARY ANGIOPLASTY: ICD-10-CM

## 2023-11-16 DIAGNOSIS — E11.59 TYPE 2 DIABETES MELLITUS WITH OTHER CIRCULATORY COMPLICATION, WITHOUT LONG-TERM CURRENT USE OF INSULIN (MULTI): ICD-10-CM

## 2023-11-16 DIAGNOSIS — Z98.61 CAD S/P PERCUTANEOUS CORONARY ANGIOPLASTY: ICD-10-CM

## 2023-11-16 DIAGNOSIS — G40.909 NONINTRACTABLE EPILEPSY WITHOUT STATUS EPILEPTICUS, UNSPECIFIED EPILEPSY TYPE (MULTI): ICD-10-CM

## 2023-11-16 DIAGNOSIS — F41.1 GENERALIZED ANXIETY DISORDER: ICD-10-CM

## 2023-11-16 PROBLEM — E66.01 CLASS 2 SEVERE OBESITY DUE TO EXCESS CALORIES WITH SERIOUS COMORBIDITY AND BODY MASS INDEX (BMI) OF 35.0 TO 35.9 IN ADULT (MULTI): Status: ACTIVE | Noted: 2023-11-07

## 2023-11-16 PROBLEM — E66.812 CLASS 2 SEVERE OBESITY DUE TO EXCESS CALORIES WITH SERIOUS COMORBIDITY AND BODY MASS INDEX (BMI) OF 35.0 TO 35.9 IN ADULT: Status: ACTIVE | Noted: 2023-11-07

## 2023-11-16 PROCEDURE — RXMED WILLOW AMBULATORY MEDICATION CHARGE

## 2023-11-16 PROCEDURE — 1160F RVW MEDS BY RX/DR IN RCRD: CPT | Performed by: INTERNAL MEDICINE

## 2023-11-16 PROCEDURE — 1159F MED LIST DOCD IN RCRD: CPT | Performed by: INTERNAL MEDICINE

## 2023-11-16 PROCEDURE — 99213 OFFICE O/P EST LOW 20 MIN: CPT | Performed by: INTERNAL MEDICINE

## 2023-11-16 PROCEDURE — 1126F AMNT PAIN NOTED NONE PRSNT: CPT | Performed by: INTERNAL MEDICINE

## 2023-11-16 PROCEDURE — 3074F SYST BP LT 130 MM HG: CPT | Performed by: INTERNAL MEDICINE

## 2023-11-16 PROCEDURE — 1036F TOBACCO NON-USER: CPT | Performed by: INTERNAL MEDICINE

## 2023-11-16 PROCEDURE — 3078F DIAST BP <80 MM HG: CPT | Performed by: INTERNAL MEDICINE

## 2023-11-16 RX ORDER — CLONAZEPAM 0.5 MG/1
0.5 TABLET ORAL 2 TIMES DAILY
Qty: 60 TABLET | Refills: 0 | Status: SHIPPED | OUTPATIENT
Start: 2023-11-16 | End: 2023-12-12 | Stop reason: SDUPTHER

## 2023-11-16 RX ORDER — METFORMIN HYDROCHLORIDE 500 MG/1
500 TABLET ORAL
Qty: 90 TABLET | Refills: 1 | Status: SHIPPED | OUTPATIENT
Start: 2023-11-16 | End: 2024-06-04 | Stop reason: WASHOUT

## 2023-11-16 ASSESSMENT — ENCOUNTER SYMPTOMS
NERVOUS/ANXIOUS: 0
NEUROLOGICAL NEGATIVE: 1
SHORTNESS OF BREATH: 0
ARTHRALGIAS: 1
ABDOMINAL DISTENTION: 0
CONSTITUTIONAL NEGATIVE: 1
VOMITING: 0
CARDIOVASCULAR NEGATIVE: 1
CHEST TIGHTNESS: 0
RESPIRATORY NEGATIVE: 1

## 2023-11-16 NOTE — PROGRESS NOTES
Subjective   Patient ID: Lalo Antony is a 78 y.o. male who presents for Follow-up (3 mo fu and med refills).    HPI   Heart Problem  This is a chronic problem. The current episode started more than 1 year ago. The problem occurs intermittently. The problem has been unchanged. Pertinent negatives include no abdominal pain, anorexia, arthralgias, change in bowel habit, chest pain, diaphoresis, fatigue, nausea, vomiting or weakness. The treatment provided significant relief.   Anxiety  Presents for follow-up visit. Patient reports no chest pain, confusion, decreased concentration, depressed mood, dizziness, dry mouth, excessive worry, nausea or shortness of breath. Symptoms occur occasionally. The quality of sleep is fair.       Hypertension  This is a chronic problem. The current episode started more than 1 year ago. The problem has been waxing and waning since onset. The problem is controlled. Associated symptoms include anxiety. Pertinent negatives include no chest pain or shortness of breath. Past treatments include beta blockers and calcium channel blockers. The current treatment provides moderate improvement. There are no compliance problems.  Hypertensive end-organ damage includes CAD/MI.    OARRS:  No data recorded  I have personally reviewed the OARRS report for Lalo Antony. I have considered the risks of abuse, dependence, addiction and diversion    Is the patient prescribed a combination of a benzodiazepine and opioid?  No    Last Urine Drug Screen / ordered today: Yes  Recent Results (from the past 20478 hour(s))   Drug Screen, Urine With Reflex to Confirmation    Collection Time: 09/23/22  9:44 AM   Result Value Ref Range    DRUG SCREEN COMMENT URINE SEE BELOW     Amphetamine Screen, Urine PRESUMPTIVE NEGATIVE NEGATIVE    Barbiturate Screen, Urine PRESUMPTIVE NEGATIVE NEGATIVE    BENZODIAZEPINE (PRESENCE) IN URINE BY SCREEN METHOD PRESUMPTIVE NEGATIVE NEGATIVE    Cannabinoid Screen, Urine  "PRESUMPTIVE NEGATIVE NEGATIVE    Cocaine Screen, Urine PRESUMPTIVE NEGATIVE NEGATIVE    Fentanyl, Ur PRESUMPTIVE NEGATIVE NEGATIVE    Methadone Screen, Urine PRESUMPTIVE NEGATIVE NEGATIVE    Opiate Screen, Urine PRESUMPTIVE NEGATIVE NEGATIVE    Oxycodone Screen, Ur PRESUMPTIVE NEGATIVE NEGATIVE    PCP Screen, Urine PRESUMPTIVE NEGATIVE NEGATIVE     Results are as expected.     Controlled Substance Agreement:  Date of the Last Agreement: 8/14/2023  Reviewed Controlled Substance Agreement including but not limited to the benefits, risks, and alternatives to treatment with a Controlled Substance medication(s).    Benzodiazepines:  What is the patient's goal of therapy? Less anxiety  Is this being achieved with current treatment? yes    CHARLETTE-7:  No data recorded    Activities of Daily Living:   Is your overall impression that this patient is benefiting (symptom reduction outweighs side effects) from benzodiazepine therapy? Yes     1. Physical Functioning: Same  2. Family Relationship: Same  3. Social Relationship: Same  4. Mood: Better  5. Sleep Patterns: Better  6. Overall Function: Better    Review of Systems   Constitutional: Negative.    Respiratory: Negative.  Negative for chest tightness and shortness of breath.    Cardiovascular: Negative.  Negative for chest pain.   Gastrointestinal:  Negative for abdominal distention and vomiting.   Musculoskeletal:  Positive for arthralgias.   Skin:  Negative for rash.   Neurological: Negative.    Psychiatric/Behavioral:  Negative for behavioral problems. The patient is not nervous/anxious.        Objective   /79 (BP Location: Left arm, Patient Position: Sitting, BP Cuff Size: Adult)   Pulse 67   Temp 36.6 °C (97.8 °F) (Temporal)   Ht 1.702 m (5' 7\")   Wt 103 kg (228 lb)   BMI 35.71 kg/m²     Physical Exam  Vitals reviewed.   Constitutional:       Appearance: Normal appearance. He is obese.   HENT:      Head: Normocephalic and atraumatic.   Eyes:      " Conjunctiva/sclera: Conjunctivae normal.   Cardiovascular:      Rate and Rhythm: Normal rate and regular rhythm.   Pulmonary:      Effort: Pulmonary effort is normal.      Breath sounds: Normal breath sounds.   Abdominal:      General: Abdomen is protuberant.      Palpations: Abdomen is soft.   Musculoskeletal:         General: No swelling or tenderness.      Cervical back: Normal range of motion.      Right lower leg: Edema present.      Left lower leg: Edema present.   Skin:     General: Skin is warm and dry.   Neurological:      General: No focal deficit present.   Psychiatric:         Mood and Affect: Mood normal.       Assessment/Plan   Problem List Items Addressed This Visit             ICD-10-CM    CAD S/P percutaneous coronary angioplasty I25.10, Z98.61    Benign essential hypertension - Primary I10    Epilepsy (CMS/[facility]) G40.909    Generalized anxiety disorder F41.1    Relevant Medications    clonazePAM (KlonoPIN) 0.5 mg tablet     Other Visit Diagnoses         Codes    Type 2 diabetes mellitus with other circulatory complication, without long-term current use of insulin (CMS/[facility])     E11.59    Relevant Medications    metFORMIN (Glucophage) 500 mg tablet        OARRS reviewed, UDS has been done on annual basis. Rx patten and usage reviewed, adverse reactions to controlled substance and risk of habituality and addiction and overdose and death explained, pt has been explained about [facility] controlled substance policy and mandates and follow ups. Controlled substance agreement has been reviewed and renewed annually. Please keep bottles containing controlled substance medications away from children and in safe area, do not share this Rx to anyone, noncompliance will lead to cessation of therapy. So far patient has been doing well and shows compliance.   Anxiety is very common and debilitating disorder and it can affect QOL and our daily routine, Rx was reviewed. Appropriate adjustment of therapy has  been discussed, proper nutritious and scheduled eating, mindfulness, scheduled sleep routine and avoidance of usage of smart devices at night and social media can be helpful. Light exercises, meditation and keeping in close contact with family members and close friends can be helpful, if feeling anxious then please calm down, take a deep breath and relax. Continue to take medications as ordered and always we can consider mental health counselling. Please feel free to contact us in needful basis.  Patient has a 13 PCIs no problem, he has no complaints today, he remains on clonazepam, last time urine drug screening did not reveal any benzodiazepines and that is what happened with clonazepam at therapy.  We do not see benzodiazepine and confirmatory test has not been done but I noticed patient that he is compliant and he has been taking clonazepam and OARRS report has been reviewed by me.  His hemoglobin A1c was 6.4, metformin should be initiated, 1 tablet to be sufficient, BP readings are stable, no seizures, no intractable anxiety, visceral obesity persist, no more nausea, vomiting, stomach upset, list of medications are reviewed, statin Zetia combination to be continued, clonazepam was renewed, hemoglobin A1c next time, follow-up in 3 months.

## 2023-11-16 NOTE — PROGRESS NOTES
"Subjective   Patient ID: Lalo Antony is a 78 y.o. male who presents for Follow-up (3 mo fu and med refills).    HPI     Review of Systems    Objective   /79 (BP Location: Left arm, Patient Position: Sitting, BP Cuff Size: Adult)   Pulse 67   Temp 36.6 °C (97.8 °F) (Temporal)   Ht 1.702 m (5' 7\")   Wt 103 kg (228 lb)   BMI 35.71 kg/m²     Physical Exam    Assessment/Plan          "

## 2023-11-24 ENCOUNTER — PHARMACY VISIT (OUTPATIENT)
Dept: PHARMACY | Facility: CLINIC | Age: 78
End: 2023-11-24
Payer: COMMERCIAL

## 2023-11-24 DIAGNOSIS — Z98.61 CAD S/P PERCUTANEOUS CORONARY ANGIOPLASTY: ICD-10-CM

## 2023-11-24 DIAGNOSIS — I25.10 CAD S/P PERCUTANEOUS CORONARY ANGIOPLASTY: ICD-10-CM

## 2023-11-24 PROCEDURE — RXMED WILLOW AMBULATORY MEDICATION CHARGE

## 2023-11-24 RX ORDER — CLOPIDOGREL BISULFATE 75 MG/1
75 TABLET ORAL
Qty: 90 TABLET | Refills: 3 | Status: SHIPPED | OUTPATIENT
Start: 2023-11-24 | End: 2024-11-23

## 2023-11-27 ENCOUNTER — PHARMACY VISIT (OUTPATIENT)
Dept: PHARMACY | Facility: CLINIC | Age: 78
End: 2023-11-27
Payer: COMMERCIAL

## 2023-11-27 DIAGNOSIS — I10 BENIGN ESSENTIAL HYPERTENSION: ICD-10-CM

## 2023-11-27 DIAGNOSIS — G40.909 NONINTRACTABLE EPILEPSY WITHOUT STATUS EPILEPTICUS, UNSPECIFIED EPILEPSY TYPE (MULTI): ICD-10-CM

## 2023-11-27 PROCEDURE — RXMED WILLOW AMBULATORY MEDICATION CHARGE

## 2023-11-27 RX ORDER — LOSARTAN POTASSIUM 50 MG/1
50 TABLET ORAL DAILY
Qty: 90 TABLET | Refills: 0 | Status: SHIPPED | OUTPATIENT
Start: 2023-11-27 | End: 2024-02-26 | Stop reason: SDUPTHER

## 2023-11-27 RX ORDER — PHENYTOIN SODIUM 100 MG/1
100 CAPSULE, EXTENDED RELEASE ORAL 3 TIMES DAILY
Qty: 270 CAPSULE | Refills: 0 | Status: SHIPPED | OUTPATIENT
Start: 2023-11-27 | End: 2024-02-26 | Stop reason: SDUPTHER

## 2023-12-06 DIAGNOSIS — E78.2 HYPERLIPIDEMIA, MIXED: ICD-10-CM

## 2023-12-06 PROCEDURE — RXMED WILLOW AMBULATORY MEDICATION CHARGE

## 2023-12-06 RX ORDER — EZETIMIBE 10 MG/1
10 TABLET ORAL DAILY
Qty: 90 TABLET | Refills: 0 | Status: SHIPPED | OUTPATIENT
Start: 2023-12-06 | End: 2024-03-11 | Stop reason: SDUPTHER

## 2023-12-07 ENCOUNTER — PHARMACY VISIT (OUTPATIENT)
Dept: PHARMACY | Facility: CLINIC | Age: 78
End: 2023-12-07
Payer: COMMERCIAL

## 2023-12-12 ENCOUNTER — PHARMACY VISIT (OUTPATIENT)
Dept: PHARMACY | Facility: CLINIC | Age: 78
End: 2023-12-12
Payer: COMMERCIAL

## 2023-12-12 DIAGNOSIS — F41.1 GENERALIZED ANXIETY DISORDER: ICD-10-CM

## 2023-12-12 PROCEDURE — RXMED WILLOW AMBULATORY MEDICATION CHARGE

## 2023-12-12 RX ORDER — CLONAZEPAM 0.5 MG/1
0.5 TABLET ORAL 2 TIMES DAILY
Qty: 60 TABLET | Refills: 0 | Status: SHIPPED | OUTPATIENT
Start: 2023-12-12 | End: 2024-01-03 | Stop reason: SDUPTHER

## 2023-12-27 ENCOUNTER — OFFICE VISIT (OUTPATIENT)
Dept: PRIMARY CARE | Facility: CLINIC | Age: 78
End: 2023-12-27
Payer: COMMERCIAL

## 2023-12-27 ENCOUNTER — PHARMACY VISIT (OUTPATIENT)
Dept: PHARMACY | Facility: CLINIC | Age: 78
End: 2023-12-27
Payer: COMMERCIAL

## 2023-12-27 VITALS
DIASTOLIC BLOOD PRESSURE: 80 MMHG | TEMPERATURE: 97.5 F | HEIGHT: 67 IN | WEIGHT: 216 LBS | HEART RATE: 78 BPM | BODY MASS INDEX: 33.9 KG/M2 | SYSTOLIC BLOOD PRESSURE: 120 MMHG

## 2023-12-27 DIAGNOSIS — G44.52 NEW DAILY PERSISTENT HEADACHE: ICD-10-CM

## 2023-12-27 DIAGNOSIS — F41.1 GENERALIZED ANXIETY DISORDER: Primary | ICD-10-CM

## 2023-12-27 PROCEDURE — 1159F MED LIST DOCD IN RCRD: CPT | Performed by: INTERNAL MEDICINE

## 2023-12-27 PROCEDURE — 3074F SYST BP LT 130 MM HG: CPT | Performed by: INTERNAL MEDICINE

## 2023-12-27 PROCEDURE — RXMED WILLOW AMBULATORY MEDICATION CHARGE

## 2023-12-27 PROCEDURE — 1160F RVW MEDS BY RX/DR IN RCRD: CPT | Performed by: INTERNAL MEDICINE

## 2023-12-27 PROCEDURE — 1126F AMNT PAIN NOTED NONE PRSNT: CPT | Performed by: INTERNAL MEDICINE

## 2023-12-27 PROCEDURE — 4004F PT TOBACCO SCREEN RCVD TLK: CPT | Performed by: INTERNAL MEDICINE

## 2023-12-27 PROCEDURE — 99214 OFFICE O/P EST MOD 30 MIN: CPT | Performed by: INTERNAL MEDICINE

## 2023-12-27 PROCEDURE — 3079F DIAST BP 80-89 MM HG: CPT | Performed by: INTERNAL MEDICINE

## 2023-12-27 RX ORDER — NORTRIPTYLINE HYDROCHLORIDE 25 MG/1
25 CAPSULE ORAL NIGHTLY
Qty: 30 CAPSULE | Refills: 11 | Status: SHIPPED | OUTPATIENT
Start: 2023-12-27 | End: 2024-02-29 | Stop reason: SINTOL

## 2023-12-27 ASSESSMENT — ENCOUNTER SYMPTOMS
FEELING OF CHOKING: 0
COMPULSIONS: 0
PALPITATIONS: 0
DIZZINESS: 0
RESTLESSNESS: 1
DEPRESSED MOOD: 1
NECK PAIN: 1
NAUSEA: 0
DECREASED CONCENTRATION: 0
EYES NEGATIVE: 1
INSOMNIA: 1
SHORTNESS OF BREATH: 0
NERVOUS/ANXIOUS: 1
HEADACHES: 1
RESPIRATORY NEGATIVE: 1
CARDIOVASCULAR NEGATIVE: 1
GASTROINTESTINAL NEGATIVE: 1
CONSTITUTIONAL NEGATIVE: 1
CONFUSION: 0

## 2023-12-27 NOTE — PROGRESS NOTES
"Subjective   Patient ID: Lalo Antony is a 78 y.o. male who presents for Follow-up (Fu for anxiety and migraine).    Anxiety  Presents for follow-up visit. Symptoms include depressed mood, insomnia, nervous/anxious behavior and restlessness. Patient reports no chest pain, compulsions, confusion, decreased concentration, dizziness, excessive worry, feeling of choking, nausea, palpitations or shortness of breath. Symptoms occur constantly. The quality of sleep is non-restorative.     Compliance with medications is %.   Headache   This is a recurrent problem. The current episode started 1 to 4 weeks ago. The problem occurs constantly. The problem has been unchanged. The pain is located in the Occipital region. The pain radiates to the upper back. The quality of the pain is described as band-like. The pain is at a severity of 8/10. The pain is moderate. Associated symptoms include insomnia and neck pain. Pertinent negatives include no dizziness or nausea. He has tried acetaminophen and Excedrin for the symptoms. The treatment provided no relief.        Review of Systems   Constitutional: Negative.    HENT: Negative.     Eyes: Negative.    Respiratory: Negative.  Negative for shortness of breath.    Cardiovascular: Negative.  Negative for chest pain and palpitations.   Gastrointestinal: Negative.  Negative for nausea.   Musculoskeletal:  Positive for neck pain.   Skin: Negative.    Neurological:  Positive for headaches. Negative for dizziness.   Psychiatric/Behavioral:  Negative for confusion and decreased concentration. The patient is nervous/anxious and has insomnia.        Objective   /80 (BP Location: Right arm, Patient Position: Sitting, BP Cuff Size: Adult)   Pulse 78   Temp 36.4 °C (97.5 °F) (Temporal)   Ht 1.702 m (5' 7\")   Wt 98 kg (216 lb)   BMI 33.83 kg/m²     Physical Exam  Vitals reviewed.   Constitutional:       Appearance: Normal appearance. He is obese.   HENT:      Head: " Normocephalic and atraumatic.   Eyes:      Conjunctiva/sclera: Conjunctivae normal.   Cardiovascular:      Rate and Rhythm: Normal rate and regular rhythm.      Pulses: Normal pulses.   Pulmonary:      Effort: Pulmonary effort is normal.      Breath sounds: Normal breath sounds.   Abdominal:      Palpations: Abdomen is soft.   Musculoskeletal:         General: No tenderness.      Cervical back: Normal range of motion.   Skin:     General: Skin is warm and dry.   Neurological:      General: No focal deficit present.   Psychiatric:         Mood and Affect: Mood is anxious.       Assessment/Plan   Problem List Items Addressed This Visit             ICD-10-CM    Generalized anxiety disorder - Primary F41.1    Relevant Medications    nortriptyline (Pamelor) 25 mg capsule     Other Visit Diagnoses         Codes    New daily persistent headache     G44.52        OARRS reviewed, UDS has been done on annual basis. Rx patten and usage reviewed, adverse reactions to controlled substance and risk of habituality and addiction and overdose and death explained, pt has been explained about [facility] controlled substance policy and mandates and follow ups. Controlled substance agreement has been reviewed and renewed annually. Please keep bottles containing controlled substance medications away from children and in safe area, do not share this Rx to anyone, noncompliance will lead to cessation of therapy. So far patient has been doing well and shows compliance.  Again his anxiety has been significant, because of anxiety he is tossing around, he cannot sleep, he has to cancel his vacation, he started taking clonazepam 3 times a day and I told that that cannot happen and instructions for the dosage cannot be altered, we will have a difficulty giving clonazepam in earlier timeframe, thought that what should we do so thought that Seroquel therapy can be considered but it has a drug interaction so antipsychotics will not be  given so nortriptyline can be attempted that can help with this chronic daily headaches, avoid use of Excedrin and it has a lot of caffeine so no Excedrin at all, so clonazepam twice a day and nortriptyline 25 mg at night will be attempted, I counseled him briefly, he just helped to calm down, headache is not significant or any abnormal trends or pattern to be shown to do any imaging, and he started smoking and he has to stop smoking immediately because he has extensive coronary artery disease.  Wife will inform me about the response with the nortriptyline treatment.  3 times a day clonazepam will not be given.

## 2024-01-03 ENCOUNTER — PHARMACY VISIT (OUTPATIENT)
Dept: PHARMACY | Facility: CLINIC | Age: 79
End: 2024-01-03

## 2024-01-03 DIAGNOSIS — F41.1 GENERALIZED ANXIETY DISORDER: ICD-10-CM

## 2024-01-03 PROCEDURE — RXMED WILLOW AMBULATORY MEDICATION CHARGE

## 2024-01-03 RX ORDER — CLONAZEPAM 0.5 MG/1
0.5 TABLET ORAL 2 TIMES DAILY
Qty: 60 TABLET | Refills: 0 | Status: SHIPPED | OUTPATIENT
Start: 2024-01-03 | End: 2024-02-01 | Stop reason: SDUPTHER

## 2024-01-06 PROCEDURE — RXMED WILLOW AMBULATORY MEDICATION CHARGE

## 2024-01-08 ENCOUNTER — PHARMACY VISIT (OUTPATIENT)
Dept: PHARMACY | Facility: CLINIC | Age: 79
End: 2024-01-08
Payer: COMMERCIAL

## 2024-01-25 ENCOUNTER — PHARMACY VISIT (OUTPATIENT)
Dept: PHARMACY | Facility: CLINIC | Age: 79
End: 2024-01-25
Payer: COMMERCIAL

## 2024-01-25 PROCEDURE — RXMED WILLOW AMBULATORY MEDICATION CHARGE

## 2024-01-26 ENCOUNTER — LAB (OUTPATIENT)
Dept: LAB | Facility: LAB | Age: 79
End: 2024-01-26
Payer: COMMERCIAL

## 2024-01-26 DIAGNOSIS — E11.59 TYPE 2 DIABETES MELLITUS WITH OTHER CIRCULATORY COMPLICATION, WITHOUT LONG-TERM CURRENT USE OF INSULIN (MULTI): ICD-10-CM

## 2024-01-26 LAB
EST. AVERAGE GLUCOSE BLD GHB EST-MCNC: 126 MG/DL
HBA1C MFR BLD: 6 %

## 2024-01-26 PROCEDURE — 83036 HEMOGLOBIN GLYCOSYLATED A1C: CPT

## 2024-01-26 PROCEDURE — 36415 COLL VENOUS BLD VENIPUNCTURE: CPT

## 2024-02-01 DIAGNOSIS — F41.1 GENERALIZED ANXIETY DISORDER: ICD-10-CM

## 2024-02-01 PROCEDURE — RXMED WILLOW AMBULATORY MEDICATION CHARGE

## 2024-02-01 RX ORDER — CLONAZEPAM 0.5 MG/1
0.5 TABLET ORAL 2 TIMES DAILY
Qty: 60 TABLET | Refills: 0 | Status: SHIPPED | OUTPATIENT
Start: 2024-02-01 | End: 2024-02-29 | Stop reason: SDUPTHER

## 2024-02-02 ENCOUNTER — PHARMACY VISIT (OUTPATIENT)
Dept: PHARMACY | Facility: CLINIC | Age: 79
End: 2024-02-02
Payer: COMMERCIAL

## 2024-02-12 ENCOUNTER — TELEPHONE (OUTPATIENT)
Dept: PRIMARY CARE | Facility: CLINIC | Age: 79
End: 2024-02-12
Payer: COMMERCIAL

## 2024-02-12 NOTE — TELEPHONE ENCOUNTER
Taking Nortriptyline 25 mg at bedtime, every morning wakes up shaky and last for about 4 hrs, that started 1 week after being on medication and it is getting worse, please advise.

## 2024-02-15 PROCEDURE — RXMED WILLOW AMBULATORY MEDICATION CHARGE

## 2024-02-19 ENCOUNTER — PHARMACY VISIT (OUTPATIENT)
Dept: PHARMACY | Facility: CLINIC | Age: 79
End: 2024-02-19
Payer: COMMERCIAL

## 2024-02-21 PROCEDURE — RXMED WILLOW AMBULATORY MEDICATION CHARGE

## 2024-02-22 ENCOUNTER — APPOINTMENT (OUTPATIENT)
Dept: PRIMARY CARE | Facility: CLINIC | Age: 79
End: 2024-02-22
Payer: COMMERCIAL

## 2024-02-22 ENCOUNTER — PHARMACY VISIT (OUTPATIENT)
Dept: PHARMACY | Facility: CLINIC | Age: 79
End: 2024-02-22
Payer: COMMERCIAL

## 2024-02-26 DIAGNOSIS — G40.909 NONINTRACTABLE EPILEPSY WITHOUT STATUS EPILEPTICUS, UNSPECIFIED EPILEPSY TYPE (MULTI): ICD-10-CM

## 2024-02-26 DIAGNOSIS — I10 BENIGN ESSENTIAL HYPERTENSION: ICD-10-CM

## 2024-02-26 RX ORDER — LOSARTAN POTASSIUM 50 MG/1
50 TABLET ORAL DAILY
Qty: 90 TABLET | Refills: 1 | Status: SHIPPED | OUTPATIENT
Start: 2024-02-26

## 2024-02-26 RX ORDER — PHENYTOIN SODIUM 100 MG/1
100 CAPSULE, EXTENDED RELEASE ORAL 3 TIMES DAILY
Qty: 270 CAPSULE | Refills: 1 | Status: SHIPPED | OUTPATIENT
Start: 2024-02-26

## 2024-02-27 PROCEDURE — RXMED WILLOW AMBULATORY MEDICATION CHARGE

## 2024-02-29 ENCOUNTER — OFFICE VISIT (OUTPATIENT)
Dept: PRIMARY CARE | Facility: CLINIC | Age: 79
End: 2024-02-29
Payer: COMMERCIAL

## 2024-02-29 ENCOUNTER — PHARMACY VISIT (OUTPATIENT)
Dept: PHARMACY | Facility: CLINIC | Age: 79
End: 2024-02-29
Payer: COMMERCIAL

## 2024-02-29 VITALS
DIASTOLIC BLOOD PRESSURE: 78 MMHG | HEIGHT: 67 IN | BODY MASS INDEX: 32.02 KG/M2 | TEMPERATURE: 96.8 F | WEIGHT: 204 LBS | HEART RATE: 67 BPM | SYSTOLIC BLOOD PRESSURE: 120 MMHG

## 2024-02-29 DIAGNOSIS — Z98.61 CAD S/P PERCUTANEOUS CORONARY ANGIOPLASTY: ICD-10-CM

## 2024-02-29 DIAGNOSIS — I25.10 CAD S/P PERCUTANEOUS CORONARY ANGIOPLASTY: ICD-10-CM

## 2024-02-29 DIAGNOSIS — I10 BENIGN ESSENTIAL HYPERTENSION: ICD-10-CM

## 2024-02-29 DIAGNOSIS — E11.59 TYPE 2 DIABETES MELLITUS WITH OTHER CIRCULATORY COMPLICATION, WITHOUT LONG-TERM CURRENT USE OF INSULIN (MULTI): Primary | ICD-10-CM

## 2024-02-29 DIAGNOSIS — G40.909 NONINTRACTABLE EPILEPSY WITHOUT STATUS EPILEPTICUS, UNSPECIFIED EPILEPSY TYPE (MULTI): ICD-10-CM

## 2024-02-29 DIAGNOSIS — F41.1 GENERALIZED ANXIETY DISORDER: ICD-10-CM

## 2024-02-29 PROBLEM — E66.811 CLASS 1 OBESITY DUE TO EXCESS CALORIES WITH SERIOUS COMORBIDITY AND BODY MASS INDEX (BMI) OF 31.0 TO 31.9 IN ADULT: Status: ACTIVE | Noted: 2023-11-07

## 2024-02-29 PROBLEM — E66.09 CLASS 1 OBESITY DUE TO EXCESS CALORIES WITH SERIOUS COMORBIDITY AND BODY MASS INDEX (BMI) OF 31.0 TO 31.9 IN ADULT: Status: ACTIVE | Noted: 2023-11-07

## 2024-02-29 PROCEDURE — 1126F AMNT PAIN NOTED NONE PRSNT: CPT | Performed by: INTERNAL MEDICINE

## 2024-02-29 PROCEDURE — 1160F RVW MEDS BY RX/DR IN RCRD: CPT | Performed by: INTERNAL MEDICINE

## 2024-02-29 PROCEDURE — 99213 OFFICE O/P EST LOW 20 MIN: CPT | Performed by: INTERNAL MEDICINE

## 2024-02-29 PROCEDURE — 1159F MED LIST DOCD IN RCRD: CPT | Performed by: INTERNAL MEDICINE

## 2024-02-29 PROCEDURE — RXMED WILLOW AMBULATORY MEDICATION CHARGE

## 2024-02-29 PROCEDURE — 3078F DIAST BP <80 MM HG: CPT | Performed by: INTERNAL MEDICINE

## 2024-02-29 PROCEDURE — 3074F SYST BP LT 130 MM HG: CPT | Performed by: INTERNAL MEDICINE

## 2024-02-29 RX ORDER — CLONAZEPAM 0.5 MG/1
0.5 TABLET ORAL 2 TIMES DAILY
Qty: 60 TABLET | Refills: 1 | Status: SHIPPED | OUTPATIENT
Start: 2024-02-29 | End: 2024-04-25 | Stop reason: SDUPTHER

## 2024-02-29 ASSESSMENT — ENCOUNTER SYMPTOMS
NERVOUS/ANXIOUS: 1
NEUROLOGICAL NEGATIVE: 1
GASTROINTESTINAL NEGATIVE: 1
CONSTITUTIONAL NEGATIVE: 1
RESPIRATORY NEGATIVE: 1
MUSCULOSKELETAL NEGATIVE: 1
CARDIOVASCULAR NEGATIVE: 1

## 2024-02-29 NOTE — PROGRESS NOTES
Subjective   Patient ID: Lalo Antony is a 78 y.o. male who presents for Follow-up (2 mo fu).  HPI  Heart Problem  This is a chronic problem. The current episode started more than 1 year ago. The problem occurs intermittently. The problem has been unchanged. Pertinent negatives include no abdominal pain, anorexia, arthralgias, change in bowel habit, chest pain, diaphoresis, fatigue, nausea, vomiting or weakness. The treatment provided significant relief.   Anxiety  Presents for follow-up visit. Patient reports no chest pain, confusion, decreased concentration, depressed mood, dizziness, dry mouth, excessive worry, nausea or shortness of breath. Symptoms occur occasionally. The quality of sleep is fair.   No data recorded  I have personally reviewed the OARRS report for Lalo Antony. I have considered the risks of abuse, dependence, addiction and diversion    Is the patient prescribed a combination of a benzodiazepine and opioid?  No    Last Urine Drug Screen / ordered today: Yes  Recent Results (from the past 02954 hour(s))   Drug Screen, Urine With Reflex to Confirmation    Collection Time: 09/23/22  9:44 AM   Result Value Ref Range    DRUG SCREEN COMMENT URINE SEE BELOW     Amphetamine Screen, Urine PRESUMPTIVE NEGATIVE NEGATIVE    Barbiturate Screen, Urine PRESUMPTIVE NEGATIVE NEGATIVE    BENZODIAZEPINE (PRESENCE) IN URINE BY SCREEN METHOD PRESUMPTIVE NEGATIVE NEGATIVE    Cannabinoid Screen, Urine PRESUMPTIVE NEGATIVE NEGATIVE    Cocaine Screen, Urine PRESUMPTIVE NEGATIVE NEGATIVE    Fentanyl, Ur PRESUMPTIVE NEGATIVE NEGATIVE    Methadone Screen, Urine PRESUMPTIVE NEGATIVE NEGATIVE    Opiate Screen, Urine PRESUMPTIVE NEGATIVE NEGATIVE    Oxycodone Screen, Ur PRESUMPTIVE NEGATIVE NEGATIVE    PCP Screen, Urine PRESUMPTIVE NEGATIVE NEGATIVE     Results are as expected.     Controlled Substance Agreement:  Date of the Last Agreement: 8/14/2023  Reviewed Controlled Substance Agreement including but not limited  to the benefits, risks, and alternatives to treatment with a Controlled Substance medication(s).    Benzodiazepines:  What is the patient's goal of therapy? Less anxiety  Is this being achieved with current treatment? yes    CHARLETTE-7:  No data recorded    Activities of Daily Living:   Is your overall impression that this patient is benefiting (symptom reduction outweighs side effects) from benzodiazepine therapy? Yes     1. Physical Functioning: Same  2. Family Relationship: Same  3. Social Relationship: Same  4. Mood: Better  5. Sleep Patterns: Better  6. Overall Function: Better    Hypertension  This is a chronic problem. The current episode started more than 1 year ago. The problem has been waxing and waning since onset. The problem is controlled. Associated symptoms include anxiety. Pertinent negatives include no chest pain or shortness of breath. Past treatments include beta blockers and calcium channel blockers. The current treatment provides moderate improvement. There are no compliance problems.  Hypertensive end-organ damage includes CAD/MI.   Past Medical History  History reviewed. No pertinent past medical history.    Social History  Social History     Tobacco Use    Smoking status: Every Day     Packs/day: .5     Types: Cigarettes     Last attempt to quit: 1990     Years since quittin.1    Smokeless tobacco: Never   Vaping Use    Vaping Use: Never used   Substance Use Topics    Alcohol use: Not Currently    Drug use: Never       Family History     Family History   Problem Relation Name Age of Onset    Lung cancer Mother      Cancer Mother      Coronary artery disease Father      Heart attack Father      Skin cancer Son         Allergies:  Allergies   Allergen Reactions    Beta-Blockers (Beta-Adrenergic Blocking Agts) Unknown    Rosuvastatin Rash and Swelling     severe muscle pain        Outpatient Medications:  Current Outpatient Medications   Medication Instructions    aspirin 81 mg, oral, Daily  RT    cholecalciferol (VITAMIN D3) 25 mcg, oral, Daily    clonazePAM (KLONOPIN) 0.5 mg, oral, 2 times daily    clopidogrel (PLAVIX) 75 mg, oral, Daily RT    Dilantin Extended 100 mg, oral, 3 times daily    ezetimibe (ZETIA) 10 mg, oral, Daily    furosemide (Lasix) 40 mg tablet TAKE 1 TABLET BY MOUTH ONCE DAILY    isosorbide mononitrate ER (Imdur) 30 mg 24 hr tablet TAKE 1 TABLET BY MOUTH ONCE DAILY    losartan (COZAAR) 50 mg, oral, Daily    magnesium oxide (Mag-Ox) 400 mg (241.3 mg magnesium) tablet 1 tablet, oral, Daily    metFORMIN (Glucophage) 500 mg tablet Take 1 tablet (500 mg) by mouth once daily with breakfast    nitroglycerin (Nitrostat) 0.4 mg SL tablet DISSOLVE 1 TABLET UNDER TONGUE EVERY 5 MINUTES FOR UP TO 3 DOSES AS NEEDED FOR CHEST PAIN. IF PAIN PERSISTS DIAL 911 OR GO TO ER.    simvastatin (Zocor) 40 mg tablet TAKE 1 TABLET BY MOUTH EVERY NIGHT AT BEDTIME        Review of Systems   Constitutional: Negative.         Wt loss intentional   Respiratory: Negative.     Cardiovascular: Negative.    Gastrointestinal: Negative.    Musculoskeletal: Negative.    Neurological: Negative.    Psychiatric/Behavioral:  The patient is nervous/anxious.          Objective   Vitals reviewed.   Constitutional:       Appearance: Normal appearance. He is normal weight.   HENT:      Head: Normocephalic and atraumatic.   Eyes:      Conjunctiva/sclera: Conjunctivae normal.   Cardiovascular:      Rate and Rhythm: Normal rate and regular rhythm.      Pulses: Normal pulses.   Pulmonary:      Effort: Pulmonary effort is normal.      Breath sounds: Normal breath sounds.   Abdominal:      Palpations: Abdomen is soft.   Musculoskeletal:         General: no swelling No tenderness or deformity.      Cervical back: Normal range of motion.      Right lower leg: no edema     Left lower leg: no edema   Skin:     General: Skin is warm and dry.   Neurological:      General: No focal deficit present.   Psychiatric:         Mood and Affect:  "Mood normal.     Physical Exam  /78 (BP Location: Right arm, Patient Position: Sitting, BP Cuff Size: Adult)   Pulse 67   Temp 36 °C (96.8 °F) (Temporal)   Ht 1.702 m (5' 7\")   Wt 92.5 kg (204 lb)   BMI 31.95 kg/m²      Assessment/Plan   Problem List Items Addressed This Visit       CAD S/P percutaneous coronary angioplasty    Benign essential hypertension    Epilepsy (CMS/HCC)    Generalized anxiety disorder    Relevant Medications    clonazePAM (KlonoPIN) 0.5 mg tablet    Type 2 diabetes mellitus with other circulatory complication, without long-term current use of insulin (CMS/HCC) - Primary   OARRS reviewed, UDS has been done on annual basis. Rx patten and usage reviewed, adverse reactions to controlled substance and risk of habituality and addiction and overdose and death explained, pt has been explained about [facility] controlled substance policy and mandates and follow ups. Controlled substance agreement has been reviewed and renewed annually. Please keep bottles containing controlled substance medications away from children and in safe area, do not share this Rx to anyone, noncompliance will lead to cessation of therapy. So far patient has been doing well and shows compliance.   Pt does not have any angina lately, aware of using NTG if needed, aware to notify MD if any new chest pains happens. Compliance is appropriate. Statin therapy reviewed, patient feels much better, he did a fantastic job in losing weight, his BMI has been reduced, his hemoglobin A1c has been reduced, there is no angina, his anxiety is much more better, clonazepam twice a day to be continued, nortriptyline did not work out because it gave him a adverse effects, no epilepsy, no chest pain, no abdominal pain, no nausea, no any other pain or discomfort, current medications were reviewed, statin therapy to be continued.  BP readings are stable, more weight loss is expected, it is a matter of lifestyle changes, dietary " modification.  As his anxiety is much better and his sleep is better I would continue same therapeutics without any change and metformin will be continued, hemoglobin A1c can be attended periodically, as a prediabetes to diabetes, wife did not have any questions, follow-up in 3 months.

## 2024-03-05 PROCEDURE — RXMED WILLOW AMBULATORY MEDICATION CHARGE

## 2024-03-06 ENCOUNTER — PHARMACY VISIT (OUTPATIENT)
Dept: PHARMACY | Facility: CLINIC | Age: 79
End: 2024-03-06
Payer: COMMERCIAL

## 2024-03-06 PROCEDURE — RXOTC WILLOW AMBULATORY OTC CHARGE

## 2024-03-11 DIAGNOSIS — E78.2 HYPERLIPIDEMIA, MIXED: ICD-10-CM

## 2024-03-11 PROCEDURE — RXMED WILLOW AMBULATORY MEDICATION CHARGE

## 2024-03-11 RX ORDER — EZETIMIBE 10 MG/1
10 TABLET ORAL DAILY
Qty: 90 TABLET | Refills: 1 | Status: SHIPPED | OUTPATIENT
Start: 2024-03-11

## 2024-03-12 ENCOUNTER — PHARMACY VISIT (OUTPATIENT)
Dept: PHARMACY | Facility: CLINIC | Age: 79
End: 2024-03-12
Payer: COMMERCIAL

## 2024-03-24 ENCOUNTER — APPOINTMENT (OUTPATIENT)
Dept: CARDIOLOGY | Facility: HOSPITAL | Age: 79
End: 2024-03-24
Payer: COMMERCIAL

## 2024-03-24 ENCOUNTER — APPOINTMENT (OUTPATIENT)
Dept: RADIOLOGY | Facility: HOSPITAL | Age: 79
End: 2024-03-24
Payer: COMMERCIAL

## 2024-03-24 ENCOUNTER — HOSPITAL ENCOUNTER (EMERGENCY)
Facility: HOSPITAL | Age: 79
Discharge: HOME | End: 2024-03-24
Payer: COMMERCIAL

## 2024-03-24 VITALS
TEMPERATURE: 98.1 F | HEIGHT: 67 IN | HEART RATE: 62 BPM | BODY MASS INDEX: 30.61 KG/M2 | SYSTOLIC BLOOD PRESSURE: 137 MMHG | WEIGHT: 195 LBS | OXYGEN SATURATION: 98 % | RESPIRATION RATE: 18 BRPM | DIASTOLIC BLOOD PRESSURE: 65 MMHG

## 2024-03-24 DIAGNOSIS — N32.89 BLADDER WALL THICKENING: Primary | ICD-10-CM

## 2024-03-24 DIAGNOSIS — R63.4 UNEXPLAINED WEIGHT LOSS: ICD-10-CM

## 2024-03-24 DIAGNOSIS — R10.10 UPPER ABDOMINAL PAIN: ICD-10-CM

## 2024-03-24 LAB
ALBUMIN SERPL BCP-MCNC: 4.5 G/DL (ref 3.4–5)
ALP SERPL-CCNC: 46 U/L (ref 33–136)
ALT SERPL W P-5'-P-CCNC: 16 U/L (ref 10–52)
ANION GAP SERPL CALC-SCNC: 15 MMOL/L (ref 10–20)
APPEARANCE UR: CLEAR
AST SERPL W P-5'-P-CCNC: 16 U/L (ref 9–39)
BASOPHILS # BLD AUTO: 0.05 X10*3/UL (ref 0–0.1)
BASOPHILS NFR BLD AUTO: 0.9 %
BILIRUB SERPL-MCNC: 0.5 MG/DL (ref 0–1.2)
BILIRUB UR STRIP.AUTO-MCNC: NEGATIVE MG/DL
BNP SERPL-MCNC: 52 PG/ML (ref 0–99)
BUN SERPL-MCNC: 12 MG/DL (ref 6–23)
CALCIUM SERPL-MCNC: 9.4 MG/DL (ref 8.6–10.3)
CARDIAC TROPONIN I PNL SERPL HS: 7 NG/L (ref 0–20)
CARDIAC TROPONIN I PNL SERPL HS: 8 NG/L (ref 0–20)
CHLORIDE SERPL-SCNC: 104 MMOL/L (ref 98–107)
CO2 SERPL-SCNC: 27 MMOL/L (ref 21–32)
COLOR UR: YELLOW
CREAT SERPL-MCNC: 0.97 MG/DL (ref 0.5–1.3)
EGFRCR SERPLBLD CKD-EPI 2021: 79 ML/MIN/1.73M*2
EOSINOPHIL # BLD AUTO: 0.15 X10*3/UL (ref 0–0.4)
EOSINOPHIL NFR BLD AUTO: 2.6 %
ERYTHROCYTE [DISTWIDTH] IN BLOOD BY AUTOMATED COUNT: 14.5 % (ref 11.5–14.5)
FLUAV RNA RESP QL NAA+PROBE: NOT DETECTED
FLUBV RNA RESP QL NAA+PROBE: NOT DETECTED
GLUCOSE SERPL-MCNC: 105 MG/DL (ref 74–99)
GLUCOSE UR STRIP.AUTO-MCNC: NEGATIVE MG/DL
HCT VFR BLD AUTO: 45.2 % (ref 41–52)
HGB BLD-MCNC: 16.1 G/DL (ref 13.5–17.5)
HYALINE CASTS #/AREA URNS AUTO: ABNORMAL /LPF
IMM GRANULOCYTES # BLD AUTO: 0.01 X10*3/UL (ref 0–0.5)
IMM GRANULOCYTES NFR BLD AUTO: 0.2 % (ref 0–0.9)
INR PPP: 1.1 (ref 0.9–1.1)
KETONES UR STRIP.AUTO-MCNC: ABNORMAL MG/DL
LACTATE SERPL-SCNC: 0.9 MMOL/L (ref 0.4–2)
LACTATE SERPL-SCNC: 3.8 MMOL/L (ref 0.4–2)
LEUKOCYTE ESTERASE UR QL STRIP.AUTO: NEGATIVE
LIPASE SERPL-CCNC: 16 U/L (ref 9–82)
LYMPHOCYTES # BLD AUTO: 1.61 X10*3/UL (ref 0.8–3)
LYMPHOCYTES NFR BLD AUTO: 27.5 %
MAGNESIUM SERPL-MCNC: 2.16 MG/DL (ref 1.6–2.4)
MCH RBC QN AUTO: 34 PG (ref 26–34)
MCHC RBC AUTO-ENTMCNC: 35.6 G/DL (ref 32–36)
MCV RBC AUTO: 95 FL (ref 80–100)
MONOCYTES # BLD AUTO: 0.62 X10*3/UL (ref 0.05–0.8)
MONOCYTES NFR BLD AUTO: 10.6 %
MUCOUS THREADS #/AREA URNS AUTO: ABNORMAL /LPF
NEUTROPHILS # BLD AUTO: 3.42 X10*3/UL (ref 1.6–5.5)
NEUTROPHILS NFR BLD AUTO: 58.2 %
NITRITE UR QL STRIP.AUTO: NEGATIVE
NRBC BLD-RTO: 0 /100 WBCS (ref 0–0)
PH UR STRIP.AUTO: 5 [PH]
PLATELET # BLD AUTO: 187 X10*3/UL (ref 150–450)
POTASSIUM SERPL-SCNC: 3.5 MMOL/L (ref 3.5–5.3)
PROT SERPL-MCNC: 6.8 G/DL (ref 6.4–8.2)
PROT UR STRIP.AUTO-MCNC: NEGATIVE MG/DL
PROTHROMBIN TIME: 12.3 SECONDS (ref 9.8–12.8)
RBC # BLD AUTO: 4.74 X10*6/UL (ref 4.5–5.9)
RBC # UR STRIP.AUTO: ABNORMAL /UL
RBC #/AREA URNS AUTO: ABNORMAL /HPF
SARS-COV-2 RNA RESP QL NAA+PROBE: NOT DETECTED
SODIUM SERPL-SCNC: 142 MMOL/L (ref 136–145)
SP GR UR STRIP.AUTO: 1.02
UROBILINOGEN UR STRIP.AUTO-MCNC: <2 MG/DL
WBC # BLD AUTO: 5.9 X10*3/UL (ref 4.4–11.3)
WBC #/AREA URNS AUTO: ABNORMAL /HPF

## 2024-03-24 PROCEDURE — 84484 ASSAY OF TROPONIN QUANT: CPT | Performed by: PHYSICIAN ASSISTANT

## 2024-03-24 PROCEDURE — 93005 ELECTROCARDIOGRAM TRACING: CPT

## 2024-03-24 PROCEDURE — 36415 COLL VENOUS BLD VENIPUNCTURE: CPT | Performed by: PHYSICIAN ASSISTANT

## 2024-03-24 PROCEDURE — 74177 CT ABD & PELVIS W/CONTRAST: CPT

## 2024-03-24 PROCEDURE — 81001 URINALYSIS AUTO W/SCOPE: CPT | Performed by: PHYSICIAN ASSISTANT

## 2024-03-24 PROCEDURE — 84153 ASSAY OF PSA TOTAL: CPT | Performed by: PHYSICIAN ASSISTANT

## 2024-03-24 PROCEDURE — 87636 SARSCOV2 & INF A&B AMP PRB: CPT | Performed by: PHYSICIAN ASSISTANT

## 2024-03-24 PROCEDURE — 85025 COMPLETE CBC W/AUTO DIFF WBC: CPT | Performed by: PHYSICIAN ASSISTANT

## 2024-03-24 PROCEDURE — 83690 ASSAY OF LIPASE: CPT | Performed by: PHYSICIAN ASSISTANT

## 2024-03-24 PROCEDURE — 99285 EMERGENCY DEPT VISIT HI MDM: CPT | Mod: 25

## 2024-03-24 PROCEDURE — 85610 PROTHROMBIN TIME: CPT | Performed by: PHYSICIAN ASSISTANT

## 2024-03-24 PROCEDURE — C9113 INJ PANTOPRAZOLE SODIUM, VIA: HCPCS | Performed by: PHYSICIAN ASSISTANT

## 2024-03-24 PROCEDURE — 71260 CT THORAX DX C+: CPT | Performed by: RADIOLOGY

## 2024-03-24 PROCEDURE — 80053 COMPREHEN METABOLIC PANEL: CPT | Performed by: PHYSICIAN ASSISTANT

## 2024-03-24 PROCEDURE — 83880 ASSAY OF NATRIURETIC PEPTIDE: CPT | Performed by: PHYSICIAN ASSISTANT

## 2024-03-24 PROCEDURE — 96375 TX/PRO/DX INJ NEW DRUG ADDON: CPT

## 2024-03-24 PROCEDURE — 83605 ASSAY OF LACTIC ACID: CPT | Performed by: PHYSICIAN ASSISTANT

## 2024-03-24 PROCEDURE — 96361 HYDRATE IV INFUSION ADD-ON: CPT

## 2024-03-24 PROCEDURE — 71045 X-RAY EXAM CHEST 1 VIEW: CPT | Performed by: RADIOLOGY

## 2024-03-24 PROCEDURE — 71045 X-RAY EXAM CHEST 1 VIEW: CPT

## 2024-03-24 PROCEDURE — 2500000004 HC RX 250 GENERAL PHARMACY W/ HCPCS (ALT 636 FOR OP/ED): Performed by: PHYSICIAN ASSISTANT

## 2024-03-24 PROCEDURE — 2550000001 HC RX 255 CONTRASTS: Performed by: PHYSICIAN ASSISTANT

## 2024-03-24 PROCEDURE — 96374 THER/PROPH/DIAG INJ IV PUSH: CPT

## 2024-03-24 PROCEDURE — 2500000005 HC RX 250 GENERAL PHARMACY W/O HCPCS: Performed by: PHYSICIAN ASSISTANT

## 2024-03-24 PROCEDURE — 74177 CT ABD & PELVIS W/CONTRAST: CPT | Performed by: RADIOLOGY

## 2024-03-24 PROCEDURE — 83735 ASSAY OF MAGNESIUM: CPT | Performed by: PHYSICIAN ASSISTANT

## 2024-03-24 RX ORDER — PANTOPRAZOLE SODIUM 20 MG/1
20 TABLET, DELAYED RELEASE ORAL DAILY
Qty: 20 TABLET | Refills: 0 | Status: SHIPPED | OUTPATIENT
Start: 2024-03-24 | End: 2024-03-28 | Stop reason: SDUPTHER

## 2024-03-24 RX ORDER — SUCRALFATE 1 G/10ML
1 SUSPENSION ORAL 4 TIMES DAILY
Qty: 1200 ML | Refills: 0 | Status: SHIPPED | OUTPATIENT
Start: 2024-03-24 | End: 2024-04-24

## 2024-03-24 RX ORDER — DICYCLOMINE HYDROCHLORIDE 20 MG/1
20 TABLET ORAL 2 TIMES DAILY
Qty: 20 TABLET | Refills: 0 | Status: SHIPPED | OUTPATIENT
Start: 2024-03-24 | End: 2024-03-28 | Stop reason: SDUPTHER

## 2024-03-24 RX ORDER — KETOROLAC TROMETHAMINE 30 MG/ML
15 INJECTION, SOLUTION INTRAMUSCULAR; INTRAVENOUS ONCE
Status: COMPLETED | OUTPATIENT
Start: 2024-03-24 | End: 2024-03-24

## 2024-03-24 RX ORDER — PANTOPRAZOLE SODIUM 40 MG/10ML
40 INJECTION, POWDER, LYOPHILIZED, FOR SOLUTION INTRAVENOUS ONCE
Status: COMPLETED | OUTPATIENT
Start: 2024-03-24 | End: 2024-03-24

## 2024-03-24 RX ORDER — ONDANSETRON 4 MG/1
4 TABLET, ORALLY DISINTEGRATING ORAL EVERY 8 HOURS PRN
Qty: 15 TABLET | Refills: 0 | Status: SHIPPED | OUTPATIENT
Start: 2024-03-24 | End: 2024-03-28 | Stop reason: SDUPTHER

## 2024-03-24 RX ADMIN — DIPHENHYDRAMINE HYDROCHLORIDE AND LIDOCAINE HYDROCHLORIDE AND ALUMINUM HYDROXIDE AND MAGNESIUM HYDRO 10 ML: KIT at 22:02

## 2024-03-24 RX ADMIN — KETOROLAC TROMETHAMINE 15 MG: 30 INJECTION, SOLUTION INTRAMUSCULAR at 22:02

## 2024-03-24 RX ADMIN — PANTOPRAZOLE SODIUM 40 MG: 40 INJECTION, POWDER, FOR SOLUTION INTRAVENOUS at 18:49

## 2024-03-24 RX ADMIN — SODIUM CHLORIDE 500 ML: 9 INJECTION, SOLUTION INTRAVENOUS at 19:47

## 2024-03-24 RX ADMIN — IOHEXOL 75 ML: 350 INJECTION, SOLUTION INTRAVENOUS at 19:59

## 2024-03-24 ASSESSMENT — LIFESTYLE VARIABLES
HAVE PEOPLE ANNOYED YOU BY CRITICIZING YOUR DRINKING: NO
EVER FELT BAD OR GUILTY ABOUT YOUR DRINKING: NO
TOTAL SCORE: 0
HAVE YOU EVER FELT YOU SHOULD CUT DOWN ON YOUR DRINKING: NO
EVER HAD A DRINK FIRST THING IN THE MORNING TO STEADY YOUR NERVES TO GET RID OF A HANGOVER: NO

## 2024-03-24 ASSESSMENT — PAIN DESCRIPTION - PAIN TYPE: TYPE: ACUTE PAIN

## 2024-03-24 ASSESSMENT — PAIN - FUNCTIONAL ASSESSMENT: PAIN_FUNCTIONAL_ASSESSMENT: 0-10

## 2024-03-24 ASSESSMENT — COLUMBIA-SUICIDE SEVERITY RATING SCALE - C-SSRS
2. HAVE YOU ACTUALLY HAD ANY THOUGHTS OF KILLING YOURSELF?: NO
6. HAVE YOU EVER DONE ANYTHING, STARTED TO DO ANYTHING, OR PREPARED TO DO ANYTHING TO END YOUR LIFE?: NO
1. IN THE PAST MONTH, HAVE YOU WISHED YOU WERE DEAD OR WISHED YOU COULD GO TO SLEEP AND NOT WAKE UP?: NO

## 2024-03-24 ASSESSMENT — PAIN DESCRIPTION - LOCATION: LOCATION: ABDOMEN

## 2024-03-24 ASSESSMENT — PAIN DESCRIPTION - DESCRIPTORS: DESCRIPTORS: ACHING

## 2024-03-24 ASSESSMENT — PAIN SCALES - GENERAL: PAINLEVEL_OUTOF10: 3

## 2024-03-24 NOTE — ED PROVIDER NOTES
HPI   Chief Complaint   Patient presents with    Abdominal Pain     Epigastric pain that radiates to the R flank for 3 weeks with some nausea        78-year-old male patient comes in the emergency department today with complaints of abdominal pain for the last 3 weeks has been increasing in severity.  States he has associated nausea without vomiting.  Denies any fevers or chills.  Denies any associated chest pain is aware of but does have an extensive cardiac history of 13 cardiac stents.  Also states that over the last 3 months he is lost 55 pounds in weight.  Rates pain currently 3 out of 10 on the pain scale.  Otherwise has no other complaints this present time.                          Tristen Coma Scale Score: 15                     Patient History   History reviewed. No pertinent past medical history.  Past Surgical History:   Procedure Laterality Date    OTHER SURGICAL HISTORY  2019    Inguinal hernia repair    OTHER SURGICAL HISTORY  10/27/2021    Percutaneous transluminal coronary angioplasty    OTHER SURGICAL HISTORY  10/27/2021    Surgery    OTHER SURGICAL HISTORY  10/27/2021    Skin lesion excision    OTHER SURGICAL HISTORY  2020    Excision of basal cell carcinoma    OTHER SURGICAL HISTORY  2020    Tooth extraction    OTHER SURGICAL HISTORY  2020    Colonoscopy complete for polypectomy    OTHER SURGICAL HISTORY  2020    Stomach biopsy    OTHER SURGICAL HISTORY  2020    Esophagogastroduodenoscopy    OTHER SURGICAL HISTORY  2020    Cardiac catheterization with stent placement     Family History   Problem Relation Name Age of Onset    Lung cancer Mother      Cancer Mother      Coronary artery disease Father      Heart attack Father      Skin cancer Son       Social History     Tobacco Use    Smoking status: Every Day     Packs/day: .5     Types: Cigarettes     Last attempt to quit: 1990     Years since quittin.2    Smokeless tobacco: Never   Vaping Use     Vaping Use: Never used   Substance Use Topics    Alcohol use: Not Currently    Drug use: Never       Physical Exam   ED Triage Vitals [03/24/24 1817]   Temperature Heart Rate Respirations BP   36.7 °C (98.1 °F) 78 17 174/81      Pulse Ox Temp Source Heart Rate Source Patient Position   97 % Temporal Monitor --      BP Location FiO2 (%)     -- --       Physical Exam  Constitutional:       General: He is not in acute distress.     Appearance: Normal appearance. He is well-developed. He is not ill-appearing.   HENT:      Head: Normocephalic and atraumatic.      Comments: Cyst right scalp     Nose: Nose normal.   Eyes:      Extraocular Movements: Extraocular movements intact.      Conjunctiva/sclera: Conjunctivae normal.      Pupils: Pupils are equal, round, and reactive to light.   Cardiovascular:      Rate and Rhythm: Normal rate and regular rhythm.   Pulmonary:      Effort: Pulmonary effort is normal. No respiratory distress.      Breath sounds: Normal breath sounds. No stridor. No wheezing.   Abdominal:      General: Abdomen is flat.      Palpations: Abdomen is soft.      Tenderness: There is abdominal tenderness in the epigastric area. There is no guarding or rebound.   Musculoskeletal:         General: Normal range of motion.      Cervical back: Normal range of motion.   Skin:     General: Skin is warm and dry.   Neurological:      General: No focal deficit present.      Mental Status: He is alert and oriented to person, place, and time. Mental status is at baseline.   Psychiatric:         Mood and Affect: Mood normal.         ED Course & MDM   Diagnoses as of 03/24/24 2200   Bladder wall thickening   Upper abdominal pain   Unexplained weight loss       Medical Decision Making  78-year-old male patient comes in the emergency department today with complaints of abdominal pain for the last 3 weeks has been increasing in severity.  States he has associated nausea without vomiting.  Denies any fevers or chills.   Denies any associated chest pain is aware of but does have an extensive cardiac history of 13 cardiac stents.  Also states that over the last 3 months he is lost 55 pounds in weight.  Rates pain currently 3 out of 10 on the pain scale.  Otherwise has no other complaints this present time.    Urinalysis is negative for infection.  Negative for influenza COVID-19.  Negative BMP negative troponin negative lipase CMP within normal limits no electrolyte abnormalities.  Magnesium at 2.16.  Initial lactate 3.8.  Patient has no leukocytosis or left shift.  Patient's chest x-ray is negative.    EKG: My EKG interpretation, 60 bpm, sinus rhythm with first-degree AV block, no ST abnormalities, no T wave changes    Patient has negative lactate negative troponin negative urinalysis negative for influenza COVID-19 BNP negative lipase.  Patient CMP negative CBC is negative as well.  CT of the abdomen pelvis shows diffuse thickening involving the left aspect of the urinary bladder wall.  Neoplastic process cannot be excluded.  I did discuss the case with Dr. Muir with urology.  States the patient can be discharged follow-up as an outpatient.  Recommends order a PSA and urine cytology    Will discharge patient home.  Did discuss these findings with the patient.  Will give patient medications for pain here as well as p.o. Carafate p.o. Protonix for home.    Historian is the patient    Diagnosis: Bladder wall thickening, upper abdominal pain      Labs Reviewed   COMPREHENSIVE METABOLIC PANEL - Abnormal       Result Value    Glucose 105 (*)     Sodium 142      Potassium 3.5      Chloride 104      Bicarbonate 27      Anion Gap 15      Urea Nitrogen 12      Creatinine 0.97      eGFR 79      Calcium 9.4      Albumin 4.5      Alkaline Phosphatase 46      Total Protein 6.8      AST 16      Bilirubin, Total 0.5      ALT 16     LACTATE - Abnormal    Lactate 3.8 (*)     Narrative:     Venipuncture immediately after or during the  administration of Metamizole may lead to falsely low results. Testing should be performed immediately  prior to Metamizole dosing.   URINALYSIS WITH REFLEX CULTURE AND MICROSCOPIC - Abnormal    Color, Urine Yellow      Appearance, Urine Clear      Specific Gravity, Urine 1.025      pH, Urine 5.0      Protein, Urine NEGATIVE      Glucose, Urine NEGATIVE      Blood, Urine SMALL (1+) (*)     Ketones, Urine 5 (TRACE) (*)     Bilirubin, Urine NEGATIVE      Urobilinogen, Urine <2.0      Nitrite, Urine NEGATIVE      Leukocyte Esterase, Urine NEGATIVE     URINALYSIS MICROSCOPIC WITH REFLEX CULTURE - Abnormal    WBC, Urine 1-5      RBC, Urine 1-2      Mucus, Urine 1+      Hyaline Casts, Urine 1+ (*)    MAGNESIUM - Normal    Magnesium 2.16     PROTIME-INR - Normal    Protime 12.3      INR 1.1     B-TYPE NATRIURETIC PEPTIDE - Normal    BNP 52      Narrative:        <100 pg/mL - Heart failure unlikely  100-299 pg/mL - Intermediate probability of acute heart                  failure exacerbation. Correlate with clinical                  context and patient history.    >=300 pg/mL - Heart Failure likely. Correlate with clinical                  context and patient history.    BNP testing is performed using different testing methodology at [facility] than at other [facility]. Direct result comparisons should only be made within the same method.      LIPASE - Normal    Lipase 16      Narrative:     Venipuncture immediately after or during the administration of Metamizole may lead to falsely low results. Testing should be performed immediately prior to Metamizole dosing.   SARS-COV-2 AND INFLUENZA A/B PCR - Normal    Flu A Result Not Detected      Flu B Result Not Detected      Coronavirus 2019, PCR Not Detected      Narrative:     This assay has received FDA Emergency Use Authorization (EUA) and  is only authorized for the duration of time that circumstances exist to justify the authorization of the emergency  use of in vitro diagnostic tests for the detection of SARS-CoV-2 virus and/or diagnosis of COVID-19 infection under section 564(b)(1) of the Act, 21 U.S.C. 360bbb-3(b)(1). Testing for SARS-CoV-2 is only recommended for patients who meet current clinical and/or epidemiological criteria as defined by federal, state, or local public health directives. This assay is an in vitro diagnostic nucleic acid amplification test for the qualitative detection of SARS-CoV-2, Influenza A, and Influenza B from nasopharyngeal specimens and has been validated for use at [facility]. Negative results do not preclude COVID-19 infections or Influenza A/B infections, and should not be used as the sole basis for diagnosis, treatment, or other management decisions. If Influenza A/B and RSV PCR results are negative, testing for Parainfluenza virus, Adenovirus and Metapneumovirus is routinely performed for [facility] pediatric oncology and intensive care inpatients, and is available on other patients by placing an add-on request.    SERIAL TROPONIN-INITIAL - Normal    Troponin I, High Sensitivity 8      Narrative:     Less than 99th percentile of normal range cutoff-  Female and children under 18 years old <14 ng/L; Male <21 ng/L: Negative  Repeat testing should be performed if clinically indicated.     Female and children under 18 years old 14-50 ng/L; Male 21-50 ng/L:  Consistent with possible cardiac damage and possible increased clinical   risk. Serial measurements may help to assess extent of myocardial damage.     >50 ng/L: Consistent with cardiac damage, increased clinical risk and  myocardial infarction. Serial measurements may help assess extent of   myocardial damage.      NOTE: Children less than 1 year old may have higher baseline troponin   levels and results should be interpreted in conjunction with the overall   clinical context.     NOTE: Troponin I testing is performed using a different   testing methodology  at [facility] than at [facility]. Direct result comparisons should only   be made within the same method.   SERIAL TROPONIN, 1 HOUR - Normal    Troponin I, High Sensitivity 7      Narrative:     Less than 99th percentile of normal range cutoff-  Female and children under 18 years old <14 ng/L; Male <21 ng/L: Negative  Repeat testing should be performed if clinically indicated.     Female and children under 18 years old 14-50 ng/L; Male 21-50 ng/L:  Consistent with possible cardiac damage and possible increased clinical   risk. Serial measurements may help to assess extent of myocardial damage.     >50 ng/L: Consistent with cardiac damage, increased clinical risk and  myocardial infarction. Serial measurements may help assess extent of   myocardial damage.      NOTE: Children less than 1 year old may have higher baseline troponin   levels and results should be interpreted in conjunction with the overall   clinical context.     NOTE: Troponin I testing is performed using a different   testing methodology at [facility] than at [facility]. Direct result comparisons should only   be made within the same method.   LACTATE - Normal    Lactate 0.9      Narrative:     Venipuncture immediately after or during the administration of Metamizole may lead to falsely low results. Testing should be performed immediately  prior to Metamizole dosing.   TROPONIN SERIES- (INITIAL, 1 HR)    Narrative:     The following orders were created for panel order Troponin I Series, High Sensitivity (0, 1 HR).  Procedure                               Abnormality         Status                     ---------                               -----------         ------                     Troponin I, High Sensiti...[[id number]]  Normal              Final result               Troponin, High Sensitivi...[[id number]]  Normal              Final result                 Please view results for these tests on  the individual orders.   CBC WITH AUTO DIFFERENTIAL    WBC 5.9      nRBC 0.0      RBC 4.74      Hemoglobin 16.1      Hematocrit 45.2      MCV 95      MCH 34.0      MCHC 35.6      RDW 14.5      Platelets 187      Neutrophils % 58.2      Immature Granulocytes %, Automated 0.2      Lymphocytes % 27.5      Monocytes % 10.6      Eosinophils % 2.6      Basophils % 0.9      Neutrophils Absolute 3.42      Immature Granulocytes Absolute, Automated 0.01      Lymphocytes Absolute 1.61      Monocytes Absolute 0.62      Eosinophils Absolute 0.15      Basophils Absolute 0.05     URINALYSIS WITH REFLEX CULTURE AND MICROSCOPIC    Narrative:     The following orders were created for panel order Urinalysis with Reflex Culture and Microscopic.  Procedure                               Abnormality         Status                     ---------                               -----------         ------                     Urinalysis with Reflex C...[[id number]]  Abnormal            Final result               Extra Urine Gray Tube[[id number]]                            In process                   Please view results for these tests on the individual orders.   EXTRA URINE STARK TUBE   PSA, TOTAL AND FREE        CT chest abdomen pelvis w IV contrast   Final Result   CHEST   1.  Severe diffuse coronary wall calcifications. Recommend clinical   correlation. Study not optimized for evaluation of coronary arteries.   2. Trace amount of pericardial effusion or pericardial thickening.   3. No other significant acute abnormalities.   4. Additional detailed findings as above        ABDOMEN - PELVIS   1.  Asymmetrical diffuse thickening involving the left aspect of   urinary bladder wall. Neoplastic process can not be excluded.   Correlation with clinical presentation and cystoscopy is recommended.   2. Suspect bilateral renal cysts but too small to fully characterize.   Findings similar to prior.   3. Additional detailed nonacute findings as above.              MACRO:   None        Signed by: Leah Grover 3/24/2024 9:11 PM   Dictation workstation:   [id number]      XR chest 1 view   Final Result   No acute cardiopulmonary process.        MACRO:   None        Signed by: Tiera Becerra 3/24/2024 7:36 PM   Dictation workstation:   [id number]            Procedure  ECG 12 lead    Performed by: Perico Verma PA-C  Authorized by: Perico Verma PA-C    Interpretation:     Details:  EKG interpretation  Rate:     ECG rate:  73  Rhythm:     Rhythm: sinus rhythm    ST segments:     ST segments:  Normal  T waves:     T waves: normal         Perico Verma PA-C  03/24/24 2209

## 2024-03-25 ENCOUNTER — PHARMACY VISIT (OUTPATIENT)
Dept: PHARMACY | Facility: CLINIC | Age: 79
End: 2024-03-25
Payer: COMMERCIAL

## 2024-03-25 LAB
ATRIAL RATE: 60 BPM
ATRIAL RATE: 73 BPM
HOLD SPECIMEN: NORMAL
P AXIS: -3 DEGREES
P AXIS: 27 DEGREES
P OFFSET: 138 MS
P OFFSET: 147 MS
P ONSET: 79 MS
P ONSET: 81 MS
PR INTERVAL: 278 MS
PR INTERVAL: 286 MS
Q ONSET: 220 MS
Q ONSET: 222 MS
QRS COUNT: 10 BEATS
QRS COUNT: 12 BEATS
QRS DURATION: 90 MS
QRS DURATION: 90 MS
QT INTERVAL: 434 MS
QT INTERVAL: 456 MS
QTC CALCULATION(BAZETT): 456 MS
QTC CALCULATION(BAZETT): 478 MS
QTC FREDERICIA: 456 MS
QTC FREDERICIA: 463 MS
R AXIS: -1 DEGREES
R AXIS: 12 DEGREES
T AXIS: 42 DEGREES
T AXIS: 6 DEGREES
T OFFSET: 437 MS
T OFFSET: 450 MS
VENTRICULAR RATE: 60 BPM
VENTRICULAR RATE: 73 BPM

## 2024-03-25 PROCEDURE — RXMED WILLOW AMBULATORY MEDICATION CHARGE

## 2024-03-28 ENCOUNTER — PHARMACY VISIT (OUTPATIENT)
Dept: PHARMACY | Facility: CLINIC | Age: 79
End: 2024-03-28
Payer: COMMERCIAL

## 2024-03-28 ENCOUNTER — OFFICE VISIT (OUTPATIENT)
Dept: PRIMARY CARE | Facility: CLINIC | Age: 79
End: 2024-03-28
Payer: COMMERCIAL

## 2024-03-28 VITALS
HEIGHT: 67 IN | SYSTOLIC BLOOD PRESSURE: 120 MMHG | WEIGHT: 193 LBS | BODY MASS INDEX: 30.29 KG/M2 | TEMPERATURE: 97.1 F | HEART RATE: 76 BPM | DIASTOLIC BLOOD PRESSURE: 74 MMHG

## 2024-03-28 DIAGNOSIS — R10.33 PERIUMBILICAL ABDOMINAL PAIN: Primary | ICD-10-CM

## 2024-03-28 DIAGNOSIS — R10.10 UPPER ABDOMINAL PAIN: ICD-10-CM

## 2024-03-28 DIAGNOSIS — L30.4 INTERTRIGO: ICD-10-CM

## 2024-03-28 PROCEDURE — RXMED WILLOW AMBULATORY MEDICATION CHARGE

## 2024-03-28 PROCEDURE — 99213 OFFICE O/P EST LOW 20 MIN: CPT | Performed by: INTERNAL MEDICINE

## 2024-03-28 PROCEDURE — 1159F MED LIST DOCD IN RCRD: CPT | Performed by: INTERNAL MEDICINE

## 2024-03-28 PROCEDURE — 3078F DIAST BP <80 MM HG: CPT | Performed by: INTERNAL MEDICINE

## 2024-03-28 PROCEDURE — RXOTC WILLOW AMBULATORY OTC CHARGE

## 2024-03-28 PROCEDURE — 1160F RVW MEDS BY RX/DR IN RCRD: CPT | Performed by: INTERNAL MEDICINE

## 2024-03-28 PROCEDURE — 3074F SYST BP LT 130 MM HG: CPT | Performed by: INTERNAL MEDICINE

## 2024-03-28 RX ORDER — PANTOPRAZOLE SODIUM 40 MG/1
40 TABLET, DELAYED RELEASE ORAL DAILY
Qty: 30 TABLET | Refills: 1 | Status: SHIPPED | OUTPATIENT
Start: 2024-03-28 | End: 2024-05-22 | Stop reason: SDUPTHER

## 2024-03-28 RX ORDER — DICYCLOMINE HYDROCHLORIDE 20 MG/1
20 TABLET ORAL 3 TIMES DAILY PRN
Qty: 50 TABLET | Refills: 0 | Status: SHIPPED | OUTPATIENT
Start: 2024-03-28 | End: 2024-04-14

## 2024-03-28 RX ORDER — NYSTATIN 100000 [USP'U]/G
1 POWDER TOPICAL 2 TIMES DAILY
Qty: 30 G | Refills: 1 | Status: SHIPPED | OUTPATIENT
Start: 2024-03-28 | End: 2024-05-20 | Stop reason: WASHOUT

## 2024-03-28 RX ORDER — ONDANSETRON 4 MG/1
4 TABLET, ORALLY DISINTEGRATING ORAL EVERY 8 HOURS PRN
Qty: 45 TABLET | Refills: 0 | Status: SHIPPED | OUTPATIENT
Start: 2024-03-28 | End: 2024-04-12

## 2024-03-28 ASSESSMENT — ENCOUNTER SYMPTOMS
RESPIRATORY NEGATIVE: 1
DIZZINESS: 0
DIARRHEA: 0
ABDOMINAL DISTENTION: 0
EYES NEGATIVE: 1
NAUSEA: 0
CONSTITUTIONAL NEGATIVE: 1
ABDOMINAL PAIN: 1
ARTHRALGIAS: 0

## 2024-03-28 NOTE — PROGRESS NOTES
"Subjective   Patient ID: Lalo Antony is a 78 y.o. male who presents for Follow-up ([facility]).    Abdominal Pain  This is a recurrent problem. The current episode started in the past 7 days. The onset quality is gradual. The problem occurs constantly. The problem has been waxing and waning. The pain is located in the periumbilical region. The pain is at a severity of 3/10. The pain is mild. The abdominal pain radiates to the periumbilical region. Pertinent negatives include no arthralgias, diarrhea or nausea. The treatment provided mild relief. Prior diagnostic workup includes CT scan. There is no history of irritable bowel syndrome or pancreatitis.        Review of Systems   Constitutional: Negative.    HENT: Negative.     Eyes: Negative.    Respiratory: Negative.     Gastrointestinal:  Positive for abdominal pain. Negative for abdominal distention, diarrhea and nausea.   Musculoskeletal:  Negative for arthralgias.   Neurological:  Negative for dizziness.       Objective   /74 (BP Location: Left arm, Patient Position: Sitting, BP Cuff Size: Adult)   Pulse 76   Temp 36.2 °C (97.1 °F) (Temporal)   Ht 1.702 m (5' 7\")   Wt 87.5 kg (193 lb)   BMI 30.23 kg/m²     Physical Exam  Vitals reviewed.   Constitutional:       Appearance: Normal appearance. He is obese.   HENT:      Head: Normocephalic and atraumatic.   Eyes:      Conjunctiva/sclera: Conjunctivae normal.   Cardiovascular:      Rate and Rhythm: Normal rate and regular rhythm.      Pulses: Normal pulses.   Pulmonary:      Effort: Pulmonary effort is normal.      Breath sounds: Normal breath sounds.   Abdominal:      General: There is no distension.      Palpations: Abdomen is soft. There is no mass.      Tenderness: There is no abdominal tenderness.   Musculoskeletal:         General: Normal range of motion.      Cervical back: Neck supple.   Skin:     General: Skin is warm and dry.   Neurological:      General: No focal deficit present. "         Assessment/Plan   Problem List Items Addressed This Visit    None  Visit Diagnoses         Codes    Periumbilical abdominal pain    -  Primary R10.33        Patient's wife called today because once again he has these abdominal pains, this is a third time he has this headache, he went to emergency room on Sunday, they did a CT of chest abdomen and pelvis, did laboratories and urine there is small blood, radiology is reporting some asymmetric thickening of the urinary bladder, this was communicated to patient and patient's spouse could require cystoscopy they will arrange with the urology.  As far as this abdominal pain is concerned there is no tenderness at all, there is no guarding, there is no rigidity, there is no distention, pancreas was reported to be normal, gastrointestinal tract was reported to be normal, this is the attic he gets which remains unexplained, previous upper endoscopy was reviewed, for at least 4 days he will take Zofran around-the-clock for at least 2 weeks he will take dicyclomine and sucralfate around-the-clock and then he will increase pantoprazole to 40 mg we are just trying to control this abdominal pain of unknown etiology with this various therapeutics.  We hope you will feel better.

## 2024-04-08 ENCOUNTER — PHARMACY VISIT (OUTPATIENT)
Dept: PHARMACY | Facility: CLINIC | Age: 79
End: 2024-04-08
Payer: COMMERCIAL

## 2024-04-08 PROCEDURE — RXOTC WILLOW AMBULATORY OTC CHARGE

## 2024-04-08 PROCEDURE — RXMED WILLOW AMBULATORY MEDICATION CHARGE

## 2024-04-13 ENCOUNTER — HOSPITAL ENCOUNTER (EMERGENCY)
Facility: HOSPITAL | Age: 79
Discharge: HOME | End: 2024-04-13
Payer: COMMERCIAL

## 2024-04-13 ENCOUNTER — APPOINTMENT (OUTPATIENT)
Dept: RADIOLOGY | Facility: HOSPITAL | Age: 79
End: 2024-04-13
Payer: COMMERCIAL

## 2024-04-13 ENCOUNTER — APPOINTMENT (OUTPATIENT)
Dept: CARDIOLOGY | Facility: HOSPITAL | Age: 79
End: 2024-04-13
Payer: COMMERCIAL

## 2024-04-13 VITALS
BODY MASS INDEX: 30.29 KG/M2 | DIASTOLIC BLOOD PRESSURE: 72 MMHG | TEMPERATURE: 98.8 F | OXYGEN SATURATION: 97 % | HEIGHT: 67 IN | RESPIRATION RATE: 18 BRPM | WEIGHT: 193 LBS | SYSTOLIC BLOOD PRESSURE: 154 MMHG | HEART RATE: 67 BPM

## 2024-04-13 DIAGNOSIS — K57.92 DIVERTICULITIS: Primary | ICD-10-CM

## 2024-04-13 LAB
ALBUMIN SERPL BCP-MCNC: 4.2 G/DL (ref 3.4–5)
ALP SERPL-CCNC: 48 U/L (ref 33–136)
ALT SERPL W P-5'-P-CCNC: 10 U/L (ref 10–52)
ANION GAP SERPL CALC-SCNC: 10 MMOL/L (ref 10–20)
APPEARANCE UR: CLEAR
AST SERPL W P-5'-P-CCNC: 13 U/L (ref 9–39)
BACTERIA #/AREA URNS AUTO: ABNORMAL /HPF
BASOPHILS # BLD AUTO: 0.05 X10*3/UL (ref 0–0.1)
BASOPHILS NFR BLD AUTO: 0.9 %
BILIRUB SERPL-MCNC: 0.5 MG/DL (ref 0–1.2)
BILIRUB UR STRIP.AUTO-MCNC: NEGATIVE MG/DL
BUN SERPL-MCNC: 12 MG/DL (ref 6–23)
CALCIUM SERPL-MCNC: 9.4 MG/DL (ref 8.6–10.3)
CARDIAC TROPONIN I PNL SERPL HS: 7 NG/L (ref 0–20)
CHLORIDE SERPL-SCNC: 104 MMOL/L (ref 98–107)
CO2 SERPL-SCNC: 33 MMOL/L (ref 21–32)
COLOR UR: YELLOW
CREAT SERPL-MCNC: 0.98 MG/DL (ref 0.5–1.3)
EGFRCR SERPLBLD CKD-EPI 2021: 78 ML/MIN/1.73M*2
EOSINOPHIL # BLD AUTO: 0.14 X10*3/UL (ref 0–0.4)
EOSINOPHIL NFR BLD AUTO: 2.6 %
ERYTHROCYTE [DISTWIDTH] IN BLOOD BY AUTOMATED COUNT: 14.8 % (ref 11.5–14.5)
GLUCOSE SERPL-MCNC: 112 MG/DL (ref 74–99)
GLUCOSE UR STRIP.AUTO-MCNC: NEGATIVE MG/DL
HCT VFR BLD AUTO: 43.1 % (ref 41–52)
HGB BLD-MCNC: 15 G/DL (ref 13.5–17.5)
HYALINE CASTS #/AREA URNS AUTO: ABNORMAL /LPF
IMM GRANULOCYTES # BLD AUTO: 0.01 X10*3/UL (ref 0–0.5)
IMM GRANULOCYTES NFR BLD AUTO: 0.2 % (ref 0–0.9)
KETONES UR STRIP.AUTO-MCNC: NEGATIVE MG/DL
LACTATE SERPL-SCNC: 1.9 MMOL/L (ref 0.4–2)
LEUKOCYTE ESTERASE UR QL STRIP.AUTO: NEGATIVE
LIPASE SERPL-CCNC: 13 U/L (ref 9–82)
LYMPHOCYTES # BLD AUTO: 1.1 X10*3/UL (ref 0.8–3)
LYMPHOCYTES NFR BLD AUTO: 20.2 %
MAGNESIUM SERPL-MCNC: 2.43 MG/DL (ref 1.6–2.4)
MCH RBC QN AUTO: 33.5 PG (ref 26–34)
MCHC RBC AUTO-ENTMCNC: 34.8 G/DL (ref 32–36)
MCV RBC AUTO: 96 FL (ref 80–100)
MONOCYTES # BLD AUTO: 0.57 X10*3/UL (ref 0.05–0.8)
MONOCYTES NFR BLD AUTO: 10.5 %
MUCOUS THREADS #/AREA URNS AUTO: ABNORMAL /LPF
NEUTROPHILS # BLD AUTO: 3.57 X10*3/UL (ref 1.6–5.5)
NEUTROPHILS NFR BLD AUTO: 65.6 %
NITRITE UR QL STRIP.AUTO: NEGATIVE
NRBC BLD-RTO: 0 /100 WBCS (ref 0–0)
PH UR STRIP.AUTO: 6 [PH]
PLATELET # BLD AUTO: 206 X10*3/UL (ref 150–450)
POTASSIUM SERPL-SCNC: 4.2 MMOL/L (ref 3.5–5.3)
PROT SERPL-MCNC: 6.7 G/DL (ref 6.4–8.2)
PROT UR STRIP.AUTO-MCNC: ABNORMAL MG/DL
RBC # BLD AUTO: 4.48 X10*6/UL (ref 4.5–5.9)
RBC # UR STRIP.AUTO: ABNORMAL /UL
RBC #/AREA URNS AUTO: ABNORMAL /HPF
SODIUM SERPL-SCNC: 143 MMOL/L (ref 136–145)
SP GR UR STRIP.AUTO: 1.01
SQUAMOUS #/AREA URNS AUTO: ABNORMAL /HPF
UROBILINOGEN UR STRIP.AUTO-MCNC: 0.2 MG/DL
WBC # BLD AUTO: 5.4 X10*3/UL (ref 4.4–11.3)
WBC #/AREA URNS AUTO: ABNORMAL /HPF

## 2024-04-13 PROCEDURE — 71045 X-RAY EXAM CHEST 1 VIEW: CPT

## 2024-04-13 PROCEDURE — 93005 ELECTROCARDIOGRAM TRACING: CPT

## 2024-04-13 PROCEDURE — 96375 TX/PRO/DX INJ NEW DRUG ADDON: CPT

## 2024-04-13 PROCEDURE — 84075 ASSAY ALKALINE PHOSPHATASE: CPT | Performed by: REGISTERED NURSE

## 2024-04-13 PROCEDURE — 2500000004 HC RX 250 GENERAL PHARMACY W/ HCPCS (ALT 636 FOR OP/ED): Performed by: REGISTERED NURSE

## 2024-04-13 PROCEDURE — 96374 THER/PROPH/DIAG INJ IV PUSH: CPT | Mod: 59

## 2024-04-13 PROCEDURE — 99285 EMERGENCY DEPT VISIT HI MDM: CPT | Mod: 25

## 2024-04-13 PROCEDURE — 81001 URINALYSIS AUTO W/SCOPE: CPT | Performed by: REGISTERED NURSE

## 2024-04-13 PROCEDURE — 96361 HYDRATE IV INFUSION ADD-ON: CPT

## 2024-04-13 PROCEDURE — 85025 COMPLETE CBC W/AUTO DIFF WBC: CPT | Performed by: REGISTERED NURSE

## 2024-04-13 PROCEDURE — 83690 ASSAY OF LIPASE: CPT | Performed by: REGISTERED NURSE

## 2024-04-13 PROCEDURE — 74177 CT ABD & PELVIS W/CONTRAST: CPT | Performed by: SURGERY

## 2024-04-13 PROCEDURE — 84484 ASSAY OF TROPONIN QUANT: CPT | Performed by: REGISTERED NURSE

## 2024-04-13 PROCEDURE — 2500000005 HC RX 250 GENERAL PHARMACY W/O HCPCS: Performed by: REGISTERED NURSE

## 2024-04-13 PROCEDURE — 83605 ASSAY OF LACTIC ACID: CPT | Performed by: REGISTERED NURSE

## 2024-04-13 PROCEDURE — 74177 CT ABD & PELVIS W/CONTRAST: CPT

## 2024-04-13 PROCEDURE — 36415 COLL VENOUS BLD VENIPUNCTURE: CPT | Performed by: REGISTERED NURSE

## 2024-04-13 PROCEDURE — 2550000001 HC RX 255 CONTRASTS: Performed by: REGISTERED NURSE

## 2024-04-13 PROCEDURE — 83735 ASSAY OF MAGNESIUM: CPT | Performed by: REGISTERED NURSE

## 2024-04-13 PROCEDURE — 71045 X-RAY EXAM CHEST 1 VIEW: CPT | Performed by: RADIOLOGY

## 2024-04-13 PROCEDURE — 2500000001 HC RX 250 WO HCPCS SELF ADMINISTERED DRUGS (ALT 637 FOR MEDICARE OP): Performed by: REGISTERED NURSE

## 2024-04-13 RX ORDER — MORPHINE SULFATE 4 MG/ML
4 INJECTION, SOLUTION INTRAMUSCULAR; INTRAVENOUS ONCE
Status: COMPLETED | OUTPATIENT
Start: 2024-04-13 | End: 2024-04-13

## 2024-04-13 RX ORDER — AMOXICILLIN AND CLAVULANATE POTASSIUM 875; 125 MG/1; MG/1
1 TABLET, FILM COATED ORAL EVERY 12 HOURS
Qty: 20 TABLET | Refills: 0 | Status: SHIPPED | OUTPATIENT
Start: 2024-04-13 | End: 2024-04-23

## 2024-04-13 RX ORDER — AMOXICILLIN AND CLAVULANATE POTASSIUM 875; 125 MG/1; MG/1
1 TABLET, FILM COATED ORAL ONCE
Status: COMPLETED | OUTPATIENT
Start: 2024-04-13 | End: 2024-04-13

## 2024-04-13 RX ORDER — ONDANSETRON HYDROCHLORIDE 2 MG/ML
4 INJECTION, SOLUTION INTRAVENOUS ONCE
Status: COMPLETED | OUTPATIENT
Start: 2024-04-13 | End: 2024-04-13

## 2024-04-13 RX ADMIN — MORPHINE SULFATE 4 MG: 4 INJECTION, SOLUTION INTRAMUSCULAR; INTRAVENOUS at 19:34

## 2024-04-13 RX ADMIN — SODIUM CHLORIDE 1000 ML: 9 INJECTION, SOLUTION INTRAVENOUS at 20:38

## 2024-04-13 RX ADMIN — AMOXICILLIN AND CLAVULANATE POTASSIUM 1 TABLET: 875; 125 TABLET, FILM COATED ORAL at 22:56

## 2024-04-13 RX ADMIN — IOHEXOL 75 ML: 350 INJECTION, SOLUTION INTRAVENOUS at 20:33

## 2024-04-13 RX ADMIN — DIPHENHYDRAMINE HYDROCHLORIDE AND LIDOCAINE HYDROCHLORIDE AND ALUMINUM HYDROXIDE AND MAGNESIUM HYDRO 10 ML: KIT at 20:38

## 2024-04-13 RX ADMIN — ONDANSETRON 4 MG: 2 INJECTION INTRAMUSCULAR; INTRAVENOUS at 19:34

## 2024-04-13 ASSESSMENT — COLUMBIA-SUICIDE SEVERITY RATING SCALE - C-SSRS
6. HAVE YOU EVER DONE ANYTHING, STARTED TO DO ANYTHING, OR PREPARED TO DO ANYTHING TO END YOUR LIFE?: NO
2. HAVE YOU ACTUALLY HAD ANY THOUGHTS OF KILLING YOURSELF?: NO
1. IN THE PAST MONTH, HAVE YOU WISHED YOU WERE DEAD OR WISHED YOU COULD GO TO SLEEP AND NOT WAKE UP?: NO

## 2024-04-13 ASSESSMENT — LIFESTYLE VARIABLES
HAVE YOU EVER FELT YOU SHOULD CUT DOWN ON YOUR DRINKING: NO
EVER FELT BAD OR GUILTY ABOUT YOUR DRINKING: NO
TOTAL SCORE: 0
EVER HAD A DRINK FIRST THING IN THE MORNING TO STEADY YOUR NERVES TO GET RID OF A HANGOVER: NO
HAVE PEOPLE ANNOYED YOU BY CRITICIZING YOUR DRINKING: NO

## 2024-04-13 ASSESSMENT — PAIN SCALES - GENERAL: PAINLEVEL_OUTOF10: 8

## 2024-04-13 ASSESSMENT — PAIN - FUNCTIONAL ASSESSMENT: PAIN_FUNCTIONAL_ASSESSMENT: 0-10

## 2024-04-13 NOTE — ED PROVIDER NOTES
"HPI   Chief Complaint   Patient presents with    Abdominal Pain     \"Here for stomach pain.  Having a little nausea but the pain is getting worse.\"{         History provided by:  Patient   used: No      78-year-old male with past medical history significant for hypertension, hyperlipidemia, CAD status post multiple stents and GERD presents emergency department today for evaluation of upper abdominal pain.  Patient was seen and examined on 3/24/2024 for similar symptoms.  Patient was discharged home and he followed up with his primary care physician.  Patient's primary care physician prescribed him Bentyl and Zofran in addition to the Carafate patient was prescribed by the ED.  Patient tells me that his pain has persisted.  Patient tells me that he does have an appointment with GI June 1.  Patient tells me that his pain is an 8/10 and it is sharp in characteristic.  Patient tells me he has been taking his prescribed medications around-the-clock.  Patient tells me the pain became more intense today.  Patient tells me the pain is in his epigastric area and radiates into his right upper quadrant.  Patient denies any fever or chills, nausea vomiting, or diarrhea.  Patient denies any chest pain or shortness of breath.                  Tristen Coma Scale Score: 15                     Patient History   Past Medical History:   Diagnosis Date    Hyperlipemia      Past Surgical History:   Procedure Laterality Date    OTHER SURGICAL HISTORY  04/01/2019    Inguinal hernia repair    OTHER SURGICAL HISTORY  10/27/2021    Percutaneous transluminal coronary angioplasty    OTHER SURGICAL HISTORY  10/27/2021    Surgery    OTHER SURGICAL HISTORY  10/27/2021    Skin lesion excision    OTHER SURGICAL HISTORY  07/14/2020    Excision of basal cell carcinoma    OTHER SURGICAL HISTORY  06/04/2020    Tooth extraction    OTHER SURGICAL HISTORY  06/04/2020    Colonoscopy complete for polypectomy    OTHER SURGICAL HISTORY  " 2020    Stomach biopsy    OTHER SURGICAL HISTORY  2020    Esophagogastroduodenoscopy    OTHER SURGICAL HISTORY  2020    Cardiac catheterization with stent placement     Family History   Problem Relation Name Age of Onset    Lung cancer Mother      Cancer Mother      Coronary artery disease Father      Heart attack Father      Skin cancer Son       Social History     Tobacco Use    Smoking status: Every Day     Current packs/day: 0.00     Types: Cigarettes     Last attempt to quit: 1990     Years since quittin.3    Smokeless tobacco: Never   Vaping Use    Vaping status: Never Used   Substance Use Topics    Alcohol use: Not Currently    Drug use: Never       Physical Exam   ED Triage Vitals [24 1815]   Temperature Heart Rate Respirations BP   37.1 °C (98.8 °F) 73 18 138/88      Pulse Ox Temp Source Heart Rate Source Patient Position   96 % Temporal Monitor Sitting      BP Location FiO2 (%)     Right arm --       Physical Exam  Vitals and nursing note reviewed.   Constitutional:       Appearance: He is well-developed.   Cardiovascular:      Rate and Rhythm: Normal rate and regular rhythm.      Heart sounds: Normal heart sounds.   Pulmonary:      Effort: Pulmonary effort is normal.   Abdominal:      General: Abdomen is flat.      Palpations: Abdomen is soft.      Tenderness: There is abdominal tenderness in the right upper quadrant and epigastric area.   Skin:     General: Skin is warm and dry.      Capillary Refill: Capillary refill takes less than 2 seconds.   Neurological:      General: No focal deficit present.      Mental Status: He is alert and oriented to person, place, and time.   Psychiatric:         Mood and Affect: Mood normal.         Behavior: Behavior normal.         ED Course & MDM   Diagnoses as of 24 2236   Diverticulitis       Medical Decision Making  Upon chart review patient's CT was significant for diffuse thickening involving the left aspect of the urinary  bladder, Dr. Muir was called from the ED and he did recommend that patient follow-up for cystoscopy.  Patient tells me that PCP told him he did not need to follow-up with urology at this time.  Patient does complain of tenderness with palpation of the epigastric area and the right upper quadrant.  Will order CT to reevaluate from previous.  Will also order lipase, lactate, CBC, CMP, and urinalysis.  Given the patient is complaining epigastric pain we will also order chest x-ray, EKG and troponin to rule out acute ACS although I do have a low suspicion for this.  Patient's symptoms treated with BMX solution, morphine, and Zofran.    EKG at 19:51 with ventricular rate of 67, as interpreted by me, shows a normal rate and rhythm first-degree AV block, normal axis, normal intervals. And normal ST and T wave pattern with no evidence of acute ischemia or other acute findings    Patient's high sensitive troponin is negative.  Urinalysis is negative for nitrates and leukocytes.  Lactate is 1.9.  Lipase is 13, magnesium is 2.43.  CMP significant for bicarb 33, CBC unremarkable upon review.  CT of the abdomen pelvis does display submucosal edema and mesocolic fat stranding involving the sigmoid colon in the central abdomen concerning for uncomplicated acute diverticulitis.    Patient and his wife are updated on findings.  Patient will be discharged home with prescription for Augmentin to treat his diverticulitis.  Patient counseled on use.  Patient directed to continue the medications that he was prescribed by Dr. Lopez.  Patient encouraged to follow-up with Dr. Lopez's office mid next week for reevaluation.  Patient given return parameters.  All questions and concerns were addressed prior to discharge.  Patient discharged home in stable condition.    Procedure  Procedures     Yara Rdz, HARRISON-CNP  04/13/24 2989

## 2024-04-14 LAB
ATRIAL RATE: 67 BPM
HOLD SPECIMEN: NORMAL
P AXIS: -24 DEGREES
P OFFSET: 142 MS
P ONSET: 84 MS
PR INTERVAL: 272 MS
Q ONSET: 220 MS
QRS COUNT: 11 BEATS
QRS DURATION: 88 MS
QT INTERVAL: 442 MS
QTC CALCULATION(BAZETT): 467 MS
QTC FREDERICIA: 458 MS
R AXIS: 6 DEGREES
T AXIS: 39 DEGREES
T OFFSET: 441 MS
VENTRICULAR RATE: 67 BPM

## 2024-04-17 ENCOUNTER — PHARMACY VISIT (OUTPATIENT)
Dept: PHARMACY | Facility: CLINIC | Age: 79
End: 2024-04-17
Payer: COMMERCIAL

## 2024-04-17 DIAGNOSIS — K57.32 DIVERTICULITIS OF LARGE INTESTINE WITHOUT PERFORATION OR ABSCESS WITHOUT BLEEDING: Primary | ICD-10-CM

## 2024-04-17 PROCEDURE — RXMED WILLOW AMBULATORY MEDICATION CHARGE

## 2024-04-17 RX ORDER — CIPROFLOXACIN 250 MG/1
250 TABLET, FILM COATED ORAL 2 TIMES DAILY
Qty: 14 TABLET | Refills: 0 | Status: SHIPPED | OUTPATIENT
Start: 2024-04-17 | End: 2024-04-24

## 2024-04-17 RX ORDER — METRONIDAZOLE 250 MG/1
250 TABLET ORAL 3 TIMES DAILY
Qty: 21 TABLET | Refills: 0 | Status: SHIPPED | OUTPATIENT
Start: 2024-04-17 | End: 2024-04-24

## 2024-04-23 DIAGNOSIS — I10 BENIGN ESSENTIAL HYPERTENSION: ICD-10-CM

## 2024-04-23 PROCEDURE — RXMED WILLOW AMBULATORY MEDICATION CHARGE

## 2024-04-23 RX ORDER — FUROSEMIDE 40 MG/1
40 TABLET ORAL DAILY
Qty: 90 TABLET | Refills: 3 | Status: SHIPPED | OUTPATIENT
Start: 2024-04-23 | End: 2025-04-23

## 2024-04-23 NOTE — TELEPHONE ENCOUNTER
Received request for prescription refills for patient.   Patient follows with Dr. Jeanmarie Cooper M.D.      Request is for refill  Is patient currently on medication yes    Last OV 4/13/24  Next OV 8/13/24    Pended for signing and sent to provider

## 2024-04-24 ENCOUNTER — PHARMACY VISIT (OUTPATIENT)
Dept: PHARMACY | Facility: CLINIC | Age: 79
End: 2024-04-24
Payer: COMMERCIAL

## 2024-04-25 ENCOUNTER — PHARMACY VISIT (OUTPATIENT)
Dept: PHARMACY | Facility: CLINIC | Age: 79
End: 2024-04-25
Payer: COMMERCIAL

## 2024-04-25 ENCOUNTER — OFFICE VISIT (OUTPATIENT)
Dept: GASTROENTEROLOGY | Facility: CLINIC | Age: 79
End: 2024-04-25
Payer: COMMERCIAL

## 2024-04-25 ENCOUNTER — HOSPITAL ENCOUNTER (OUTPATIENT)
Dept: RADIOLOGY | Facility: HOSPITAL | Age: 79
Discharge: HOME | End: 2024-04-25
Payer: COMMERCIAL

## 2024-04-25 VITALS
HEIGHT: 67 IN | BODY MASS INDEX: 29.96 KG/M2 | HEART RATE: 55 BPM | DIASTOLIC BLOOD PRESSURE: 71 MMHG | SYSTOLIC BLOOD PRESSURE: 134 MMHG | TEMPERATURE: 97.7 F | WEIGHT: 190.9 LBS

## 2024-04-25 DIAGNOSIS — R10.13 EPIGASTRIC PAIN: ICD-10-CM

## 2024-04-25 DIAGNOSIS — R63.4 ABNORMAL LOSS OF WEIGHT: ICD-10-CM

## 2024-04-25 DIAGNOSIS — F41.1 GENERALIZED ANXIETY DISORDER: ICD-10-CM

## 2024-04-25 DIAGNOSIS — K57.32 DIVERTICULITIS OF LARGE INTESTINE WITHOUT PERFORATION OR ABSCESS WITHOUT BLEEDING: ICD-10-CM

## 2024-04-25 DIAGNOSIS — K21.9 GERD WITHOUT ESOPHAGITIS: ICD-10-CM

## 2024-04-25 DIAGNOSIS — R10.13 EPIGASTRIC PAIN: Primary | ICD-10-CM

## 2024-04-25 DIAGNOSIS — R10.9 ABDOMINAL PAIN: ICD-10-CM

## 2024-04-25 PROBLEM — Z86.0100 HISTORY OF COLON POLYPS: Status: ACTIVE | Noted: 2024-04-25

## 2024-04-25 PROBLEM — Z86.010 HISTORY OF COLON POLYPS: Status: ACTIVE | Noted: 2024-04-25

## 2024-04-25 PROCEDURE — 93976 VASCULAR STUDY: CPT | Performed by: RADIOLOGY

## 2024-04-25 PROCEDURE — 99214 OFFICE O/P EST MOD 30 MIN: CPT | Performed by: INTERNAL MEDICINE

## 2024-04-25 PROCEDURE — RXMED WILLOW AMBULATORY MEDICATION CHARGE

## 2024-04-25 PROCEDURE — 3075F SYST BP GE 130 - 139MM HG: CPT | Performed by: INTERNAL MEDICINE

## 2024-04-25 PROCEDURE — 93975 VASCULAR STUDY: CPT

## 2024-04-25 PROCEDURE — 1159F MED LIST DOCD IN RCRD: CPT | Performed by: INTERNAL MEDICINE

## 2024-04-25 PROCEDURE — 3078F DIAST BP <80 MM HG: CPT | Performed by: INTERNAL MEDICINE

## 2024-04-25 PROCEDURE — 1160F RVW MEDS BY RX/DR IN RCRD: CPT | Performed by: INTERNAL MEDICINE

## 2024-04-25 PROCEDURE — RXOTC WILLOW AMBULATORY OTC CHARGE

## 2024-04-25 RX ORDER — CLONAZEPAM 0.5 MG/1
0.5 TABLET ORAL 2 TIMES DAILY
Qty: 60 TABLET | Refills: 0 | Status: SHIPPED | OUTPATIENT
Start: 2024-04-25 | End: 2024-05-15 | Stop reason: SDUPTHER

## 2024-04-25 RX ORDER — POLYETHYLENE GLYCOL 3350, SODIUM CHLORIDE, SODIUM BICARBONATE, POTASSIUM CHLORIDE 420; 11.2; 5.72; 1.48 G/4L; G/4L; G/4L; G/4L
4000 POWDER, FOR SOLUTION ORAL ONCE
Qty: 4000 ML | Refills: 0 | Status: SHIPPED | OUTPATIENT
Start: 2024-04-25 | End: 2024-04-26

## 2024-04-25 RX ORDER — DICYCLOMINE HYDROCHLORIDE 20 MG/1
20 TABLET ORAL 4 TIMES DAILY PRN
Qty: 60 TABLET | Refills: 5 | Status: SHIPPED | OUTPATIENT
Start: 2024-04-25 | End: 2025-04-25

## 2024-04-25 NOTE — PATIENT INSTRUCTIONS
1) Please schedule EGD and colonoscopy at [facility] after 5/15  2) You may not eat any solid foods the ENTIRE day before the procedure.  You may have clear liquids, including water, Sprite/ginger ale, apple juice, chicken broth, Jello, tea/coffee or Gatorade.  Please do not have any red or purple liquids/ Jello. No milk or cream in your tea/coffee.   3) Take the prep as directed.   4) You will need a  for the procedure.   5) You will need to contact your cardiologist or primary provider if you are taking a blood thinner- you are on Plavix.  This may need to be stopped for the procedure.   6) Stat mesenteric ultrasound ([phone] to schedule).  Change pantoprazole to morning on an empty stomach.

## 2024-04-25 NOTE — PROGRESS NOTES
Gastroenterology Office Visit     History of Present Illness:   Lalo Antony is a 78 y.o. male who presents to GI clinic for follow up of abdominal pain and wt loss.  Since last OV in 7/21 with Jake, patient has had EGD and MRCP for epigastric pain and wt loss; see below for results.      More recently, went to ER earlier this month for epigastric pain.  Tx'd for diverticulitis with Augmentin based on CT- see below.   PCP changed this to cipro and flagyl.      He wakes in the morning with epigastric pain.  This is the same pain he saw Dr. Joseph for in summer 2021.  That pain went away, but then recurred 3 months ago.  He is afraid to eat because this worsens the pain.  The pain worsens 1-2 hours after eating.  He is avoiding eating.  He has lost 60 pounds over the past 1 year, and epic confirms this.  He was given pantoprazole, and sucralfate in the ER but these have not helped.  He has been taking pantoprazole for 2 weeks.  He takes pantoprazole anytime of day.  He has had associated nausea and vomiting on 2 occasions, but this is not his major symptom.  Zofran as needed helps.    Has CAD, s/p 13 stents on Plavix.     OV with Jake in 7/21:  For 5 months he has had epigastric abdominal pain with or without food. Appetite is suppressed. He had an EGD 8/20/2019 that was basically unremarkable except for antral gastritis. No dysphagia. The pain is in the epigastric region but does not radiate to the back. He has lost 40 pounds. Stools are little bit more loose for per day. He has had loose stools intermittently for years. Colonoscopy 2019 was unremarkable and biopsy showed no microscopic colitis. He did have a polyp. No bowel change otherwise. He has been on Protonix and Carafate. He had emergency room visit few weeks ago and CT was done. CT the abdomen revealed some mild inflammatory changes. I reviewed the CT myself. He has minimal fat planes and there may be some mild changes in the mid body. The tail looks  fine. Liver looks fine. Full laboratory was unremarkable. He denies any nocturnal sweats fever. On the CT there might be some changes in the distal main body of the stomach. This was not commented on however. Besides the pain he has nausea throughout the day. Zofran did not help.     Review of Systems  Constitutional: denies fever/ chills, night sweats, wt loss and fatigue  Respiratory: denies SOB, SHAH  CV: denies chest pain and LE edema  Neuro: denies weakness and difficulty walking      Past Medical History   has a past medical history of Hyperlipemia.     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)    Epigastric pain    Diverticulitis of large intestine without perforation or abscess without bleeding    History of colon polyps       Past Surgical History  Past Surgical History:   Procedure Laterality Date    OTHER SURGICAL HISTORY  04/01/2019    Inguinal hernia repair    OTHER SURGICAL HISTORY  10/27/2021    Percutaneous transluminal coronary angioplasty    OTHER SURGICAL HISTORY  10/27/2021    Surgery    OTHER SURGICAL HISTORY  10/27/2021    Skin lesion excision    OTHER SURGICAL HISTORY  07/14/2020    Excision of basal cell carcinoma    OTHER SURGICAL HISTORY  06/04/2020    Tooth extraction    OTHER SURGICAL HISTORY  06/04/2020    Colonoscopy complete for polypectomy    OTHER SURGICAL HISTORY  06/04/2020    Stomach biopsy    OTHER SURGICAL HISTORY  06/04/2020    Esophagogastroduodenoscopy    OTHER SURGICAL HISTORY   "06/16/2020    Cardiac catheterization with stent placement       Social History   reports that he has been smoking cigarettes. He has never used smokeless tobacco. He reports that he does not currently use alcohol. He reports that he does not use drugs.     Family History  family history includes Cancer in his mother; Coronary artery disease in his father; Heart attack in his father; Lung cancer in his mother; Skin cancer in his son.       Allergies  Allergies   Allergen Reactions    Beta-Blockers (Beta-Adrenergic Blocking Agts) Unknown    Rosuvastatin Rash and Swelling     severe muscle pain       Medications  Current Outpatient Medications   Medication Instructions    aspirin 81 mg, oral, Daily RT    cholecalciferol (VITAMIN D3) 25 mcg, oral, Daily    clonazePAM (KLONOPIN) 0.5 mg, oral, 2 times daily    clopidogrel (PLAVIX) 75 mg, oral, Daily RT    Dilantin Extended 100 mg, oral, 3 times daily    ezetimibe (ZETIA) 10 mg, oral, Daily    furosemide (Lasix) 40 mg tablet TAKE 1 TABLET BY MOUTH ONCE DAILY    isosorbide mononitrate ER (Imdur) 30 mg 24 hr tablet TAKE 1 TABLET BY MOUTH ONCE DAILY    losartan (COZAAR) 50 mg, oral, Daily    magnesium oxide (Mag-Ox) 400 mg (241.3 mg magnesium) tablet 1 tablet, oral, Daily    metFORMIN (Glucophage) 500 mg tablet Take 1 tablet (500 mg) by mouth once daily with breakfast    nitroglycerin (Nitrostat) 0.4 mg SL tablet DISSOLVE 1 TABLET UNDER TONGUE EVERY 5 MINUTES FOR UP TO 3 DOSES AS NEEDED FOR CHEST PAIN. IF PAIN PERSISTS DIAL 911 OR GO TO ER.    nystatin (Mycostatin) 100,000 unit/gram powder 1 Application, Topical, 2 times daily    pantoprazole (PROTONIX) 40 mg, oral, Daily, Do not crush, chew, or split.    polyethylene glycol-electrolytes 420 gram solution 4,000 mL, oral, Once    simvastatin (Zocor) 40 mg tablet TAKE 1 TABLET BY MOUTH EVERY NIGHT AT BEDTIME        Objective   Blood pressure 134/71, pulse 55, temperature 36.5 °C (97.7 °F), height 1.702 m (5' 7\"), weight 86.6 " kg (190 lb 14.4 oz).      General: no acute distress, well-nourished  Skin: no jaundice, rash or liver stigmata  HEENT: no icterus/ eye redness, no oral lesions; Mallampati 3; edentulous  Respiratory: normal breath sounds bilaterally, no wheezes or rales  CV: RRR, no murmurs; no LE edema  Abd: soft, nontender    LABS  Component      Latest Ref Rng 4/13/2024   LEUKOCYTES (10*3/UL) IN BLOOD BY AUTOMATED COUNT, Cam[place]onian      4.4 - 11.3 x10*3/uL 5.4    nRBC      0.0 - 0.0 /100 WBCs 0.0    ERYTHROCYTES (10*6/UL) IN BLOOD BY AUTOMATED COUNT, [place]      4.50 - 5.90 x10*6/uL 4.48 (L)    HEMOGLOBIN      13.5 - 17.5 g/dL 15.0    HEMATOCRIT      41.0 - 52.0 % 43.1    MCV      80 - 100 fL 96    MCH      26.0 - 34.0 pg 33.5    MCHC      32.0 - 36.0 g/dL 34.8    RED CELL DISTRIBUTION WIDTH      11.5 - 14.5 % 14.8 (H)    PLATELETS (10*3/UL) IN BLOOD AUTOMATED COUNT, [place]      150 - 450 x10*3/uL 206       Component      Latest Ref Rng 4/13/2024   Albumin      3.4 - 5.0 g/dL 4.2    Alkaline Phosphatase      33 - 136 U/L 48    Total Protein      6.4 - 8.2 g/dL 6.7    AST      9 - 39 U/L 13    Bilirubin Total      0.0 - 1.2 mg/dL 0.5    ALT      10 - 52 U/L 10    Lactate      0.4 - 2.0 mmol/L 1.9    LIPASE      9 - 82 U/L 13          Radiology  MRCP 9/21: normal pancreas, fatty liver    CT A/P 4/24: Colonic diverticulosis is present. There is renal thickening with  submucosal edema and mesocolonic fat stranding involving the sigmoid  colon in the central abdomen (series 202, images [id number]), suspicious  for uncomplicated acute diverticulitis/focal colitis. Colon is of  normal caliber.    Endoscopy    Colonoscopy 5/19: 5-mm HF polyp resected, pan-tics, random colon bx negative microscopic colitis  EGD 8/21 with Jake: normal E/D; gastritis    Assessment/Plan   Lalo Antony is a 78 y.o. male who presents to GI clinic for epigastric abdominal pain and 60-lbs wt loss in a vasculopath; recent diverticulitis, h/o polyps.  I  believe his epigastric pain is due to chronic mesenteric ischemia, or GERD.  He is not taking his PPI optimally.  The findings of diverticulitis on the CT scan are most likely a red herring, and would not account for his epigastric pain.    Epigastric pain  Stat mesenteric ultrasound ([phone] to schedule).  Change pantoprazole to morning on an empty stomach.     Diverticulitis of large intestine without perforation or abscess without bleeding  1) Please schedule EGD and colonoscopy at [facility] after 5/15  2) You may not eat any solid foods the ENTIRE day before the procedure.  You may have clear liquids, including water, Sprite/ginger ale, apple juice, chicken broth, Jello, tea/coffee or Gatorade.  Please do not have any red or purple liquids/ Jello. No milk or cream in your tea/coffee.   3) Take the prep as directed.   4) You will need a  for the procedure.   5) You will need to contact your cardiologist or primary provider if you are taking a blood thinner- you are on Plavix.  This may need to be stopped for the procedure.       History of colon polyps  Due for surveillance, last colon 2019.          Epigastric pain  Stat mesenteric ultrasound ([phone] to schedule).  Change pantoprazole to morning on an empty stomach.     Diverticulitis of large intestine without perforation or abscess without bleeding  1) Please schedule EGD and colonoscopy at [facility]  2) You may not eat any solid foods the ENTIRE day before the procedure.  You may have clear liquids, including water, Sprite/ginger ale, apple juice, chicken broth, Jello, tea/coffee or Gatorade.  Please do not have any red or purple liquids/ Jello. No milk or cream in your tea/coffee.   3) Take the prep as directed.   4) You will need a  for the procedure.   5) You will need to contact your cardiologist or primary provider if you are taking a blood thinner- you are on Plavix.  This may need to be stopped for the procedure.       History of  colon polyps  Due for surveillance, last colon 2019.       Rubens Fabian MD

## 2024-04-25 NOTE — ASSESSMENT & PLAN NOTE
Stat mesenteric ultrasound ([phone] to schedule).  Change pantoprazole to morning on an empty stomach.

## 2024-04-25 NOTE — ASSESSMENT & PLAN NOTE
1) Please schedule EGD and colonoscopy at [facility]  2) You may not eat any solid foods the ENTIRE day before the procedure.  You may have clear liquids, including water, Sprite/ginger ale, apple juice, chicken broth, Jello, tea/coffee or Gatorade.  Please do not have any red or purple liquids/ Jello. No milk or cream in your tea/coffee.   3) Take the prep as directed.   4) You will need a  for the procedure.   5) You will need to contact your cardiologist or primary provider if you are taking a blood thinner- you are on Plavix.  This may need to be stopped for the procedure.

## 2024-04-26 ENCOUNTER — PHARMACY VISIT (OUTPATIENT)
Dept: PHARMACY | Facility: CLINIC | Age: 79
End: 2024-04-26

## 2024-04-29 ENCOUNTER — PHARMACY VISIT (OUTPATIENT)
Dept: PHARMACY | Facility: CLINIC | Age: 79
End: 2024-04-29
Payer: COMMERCIAL

## 2024-04-29 PROCEDURE — RXMED WILLOW AMBULATORY MEDICATION CHARGE

## 2024-05-07 ENCOUNTER — TELEPHONE (OUTPATIENT)
Dept: CARDIOLOGY | Facility: CLINIC | Age: 79
End: 2024-05-07
Payer: COMMERCIAL

## 2024-05-07 NOTE — TELEPHONE ENCOUNTER
Received preop clearance form for patient pending EGD/Colonoscopy with Dr. Caren MD in 7/3/2024.     Patient last seen 11/7/2023 with Dr. Jeanmarie Cooper MD [facility]:  Lalo nAtony is a 77 y.o. year old male patient with history of coronary artery disease status post remote coronary angioplasty with stent [place] remote history of pericardial effusion resolved.  He has been doing well from a cardiac standpoint no complaint no symptoms of chest pain or shortness of breath.  Scheduled to have lab work by his primary care physician.  Has been taking his medication with no difficulty.  Blood pressure is adequately controlled.  I discussed with the patient in length we will continue current medical management he is to follow-up with his primary care physician.  He will see me back in 1 year.     Last Echo 5/2023:  1. Left ventricular systolic function is normal with a 60% estimated ejection fraction.  2. There is no evidence of mitral valve stenosis.  3. Trace mitral valve regurgitation.  4. Trace tricuspid regurgitation is visualized.  5. Aortic valve stenosis is not present.  6. Mild aortic valve regurgitation.  7. There is moderate dilatation of the aortic root.    [facility] 2/2023:   RAKESH PCI to Lcx    Form placed for Dr. Jeanmarie Cooper MD [facility] to review.   Patient is on Plavix.      Statement Selected

## 2024-05-08 DIAGNOSIS — K21.9 GERD WITHOUT ESOPHAGITIS: ICD-10-CM

## 2024-05-08 PROBLEM — R40.1 CLOUDED CONSCIOUSNESS: Status: ACTIVE | Noted: 2024-05-08

## 2024-05-08 PROBLEM — U09.9 CHRONIC POST-COVID-19 SYNDROME: Status: ACTIVE | Noted: 2024-05-08

## 2024-05-08 PROBLEM — M79.89 SWELLING OF EXTREMITY: Status: ACTIVE | Noted: 2024-05-08

## 2024-05-08 PROBLEM — G44.52 NEW DAILY PERSISTENT HEADACHE: Status: ACTIVE | Noted: 2024-05-08

## 2024-05-08 PROBLEM — R21 RASH: Status: ACTIVE | Noted: 2024-05-08

## 2024-05-08 PROBLEM — E66.9 OBESITY WITH BODY MASS INDEX 30 OR GREATER: Status: ACTIVE | Noted: 2023-11-07

## 2024-05-08 PROBLEM — F19.10 PSYCHOACTIVE SUBSTANCE ABUSE (MULTI): Status: ACTIVE | Noted: 2023-08-14

## 2024-05-08 PROBLEM — R55 SYNCOPE: Status: ACTIVE | Noted: 2024-05-08

## 2024-05-08 PROBLEM — C44.91 MALIGNANT BASAL CELL NEOPLASM OF SKIN: Status: ACTIVE | Noted: 2024-05-08

## 2024-05-08 PROBLEM — R60.0 EDEMA OF BOTH LOWER EXTREMITIES: Status: ACTIVE | Noted: 2024-05-08

## 2024-05-08 PROBLEM — R51.9 HEADACHE: Status: ACTIVE | Noted: 2024-05-08

## 2024-05-08 PROBLEM — R07.9 CHEST PAIN: Status: ACTIVE | Noted: 2024-05-08

## 2024-05-08 PROBLEM — S21.209A OPEN WOUND OF BACK WITH COMPLICATION: Status: ACTIVE | Noted: 2024-05-08

## 2024-05-08 PROBLEM — L30.9 ECZEMA OF HAND: Status: ACTIVE | Noted: 2024-05-08

## 2024-05-08 PROBLEM — R06.89 DIFFICULTY BREATHING: Status: ACTIVE | Noted: 2024-05-08

## 2024-05-08 PROBLEM — R60.0 EDEMA OF LOWER EXTREMITY: Status: ACTIVE | Noted: 2024-05-08

## 2024-05-08 PROBLEM — R10.84 GENERALIZED ABDOMINAL PAIN: Status: ACTIVE | Noted: 2019-03-01

## 2024-05-08 PROBLEM — R00.2 PALPITATIONS: Status: ACTIVE | Noted: 2024-05-08

## 2024-05-08 PROBLEM — J06.9 UPPER RESPIRATORY TRACT INFECTION: Status: ACTIVE | Noted: 2024-05-08

## 2024-05-08 PROBLEM — R42 DIZZINESS: Status: ACTIVE | Noted: 2024-05-08

## 2024-05-08 PROBLEM — R11.0 NAUSEA: Status: ACTIVE | Noted: 2019-03-01

## 2024-05-08 PROBLEM — N39.0 URINARY TRACT INFECTION: Status: ACTIVE | Noted: 2019-01-03

## 2024-05-08 PROBLEM — K92.1 MELENA: Status: ACTIVE | Noted: 2018-12-31

## 2024-05-08 PROBLEM — R19.7 DIARRHEA: Status: ACTIVE | Noted: 2019-03-19

## 2024-05-08 PROBLEM — E86.0 DEHYDRATION: Status: ACTIVE | Noted: 2019-03-19

## 2024-05-08 PROBLEM — I31.39 PERICARDIAL EFFUSION (HHS-HCC): Status: ACTIVE | Noted: 2024-05-08

## 2024-05-08 PROBLEM — N32.89 HYPERTROPHY OF BLADDER: Status: ACTIVE | Noted: 2024-05-08

## 2024-05-08 PROBLEM — I21.9 ACUTE MYOCARDIAL INFARCTION (MULTI): Status: ACTIVE | Noted: 2024-05-08

## 2024-05-08 PROBLEM — K62.1 RECTAL POLYP: Status: ACTIVE | Noted: 2019-01-17

## 2024-05-08 PROBLEM — R63.4 UNEXPLAINED WEIGHT LOSS: Status: ACTIVE | Noted: 2024-05-08

## 2024-05-08 PROCEDURE — RXMED WILLOW AMBULATORY MEDICATION CHARGE

## 2024-05-08 RX ORDER — ONDANSETRON 4 MG/1
4 TABLET, FILM COATED ORAL EVERY 8 HOURS PRN
Qty: 30 TABLET | Refills: 0 | Status: SHIPPED | OUTPATIENT
Start: 2024-05-08 | End: 2024-05-21

## 2024-05-08 NOTE — TELEPHONE ENCOUNTER
Per Dr. Jeanmarie Cooper MD [facility], patient cleared for procedures as requested.   Patient can hold Plavix x5 days preop.   Form completed and faxed with confirmation received. Placed to scanning.     Patient spouse advised with verbal understanding.

## 2024-05-09 ENCOUNTER — PHARMACY VISIT (OUTPATIENT)
Dept: PHARMACY | Facility: CLINIC | Age: 79
End: 2024-05-09
Payer: COMMERCIAL

## 2024-05-15 ENCOUNTER — PHARMACY VISIT (OUTPATIENT)
Dept: PHARMACY | Facility: CLINIC | Age: 79
End: 2024-05-15
Payer: COMMERCIAL

## 2024-05-15 DIAGNOSIS — F41.1 GENERALIZED ANXIETY DISORDER: ICD-10-CM

## 2024-05-15 PROCEDURE — RXMED WILLOW AMBULATORY MEDICATION CHARGE

## 2024-05-15 RX ORDER — CLONAZEPAM 0.5 MG/1
0.5 TABLET ORAL 2 TIMES DAILY
Qty: 60 TABLET | Refills: 0 | OUTPATIENT
Start: 2024-05-15 | End: 2024-07-14

## 2024-05-15 RX ORDER — CLONAZEPAM 0.5 MG/1
0.5 TABLET ORAL 2 TIMES DAILY
Qty: 60 TABLET | Refills: 0 | Status: SHIPPED | OUTPATIENT
Start: 2024-05-15 | End: 2024-06-04 | Stop reason: SDUPTHER

## 2024-05-15 NOTE — TELEPHONE ENCOUNTER
Pharmacy is asking for refill, they said that  has been changed, according to the report refill is early so if pharmacy is requesting for this refill this refill is provided pharmacy will look into this matter and it will be refilled in appropriate timeframe this is just a notification to the pharmac

## 2024-05-21 ENCOUNTER — ANESTHESIA EVENT (OUTPATIENT)
Dept: GASTROENTEROLOGY | Facility: HOSPITAL | Age: 79
End: 2024-05-21
Payer: COMMERCIAL

## 2024-05-21 ENCOUNTER — HOSPITAL ENCOUNTER (OUTPATIENT)
Dept: GASTROENTEROLOGY | Facility: HOSPITAL | Age: 79
Discharge: HOME | End: 2024-05-21
Payer: COMMERCIAL

## 2024-05-21 ENCOUNTER — ANESTHESIA (OUTPATIENT)
Dept: GASTROENTEROLOGY | Facility: HOSPITAL | Age: 79
End: 2024-05-21
Payer: COMMERCIAL

## 2024-05-21 VITALS
HEIGHT: 66 IN | TEMPERATURE: 98.1 F | RESPIRATION RATE: 20 BRPM | OXYGEN SATURATION: 97 % | BODY MASS INDEX: 30.05 KG/M2 | SYSTOLIC BLOOD PRESSURE: 145 MMHG | HEART RATE: 61 BPM | DIASTOLIC BLOOD PRESSURE: 68 MMHG | WEIGHT: 186.95 LBS

## 2024-05-21 DIAGNOSIS — R63.4 ABNORMAL LOSS OF WEIGHT: ICD-10-CM

## 2024-05-21 DIAGNOSIS — R10.13 EPIGASTRIC PAIN: ICD-10-CM

## 2024-05-21 DIAGNOSIS — R10.13 EPIGASTRIC PAIN: Primary | ICD-10-CM

## 2024-05-21 DIAGNOSIS — K57.32 DIVERTICULITIS OF LARGE INTESTINE WITHOUT PERFORATION OR ABSCESS WITHOUT BLEEDING: ICD-10-CM

## 2024-05-21 LAB — GLUCOSE BLD MANUAL STRIP-MCNC: 96 MG/DL (ref 74–99)

## 2024-05-21 PROCEDURE — 7100000009 HC PHASE TWO TIME - INITIAL BASE CHARGE

## 2024-05-21 PROCEDURE — 3700000002 HC GENERAL ANESTHESIA TIME - EACH INCREMENTAL 1 MINUTE

## 2024-05-21 PROCEDURE — 45380 COLONOSCOPY AND BIOPSY: CPT | Performed by: INTERNAL MEDICINE

## 2024-05-21 PROCEDURE — 87102 FUNGUS ISOLATION CULTURE: CPT | Mod: ELYLAB | Performed by: INTERNAL MEDICINE

## 2024-05-21 PROCEDURE — RXMED WILLOW AMBULATORY MEDICATION CHARGE

## 2024-05-21 PROCEDURE — 7100000010 HC PHASE TWO TIME - EACH INCREMENTAL 1 MINUTE

## 2024-05-21 PROCEDURE — 88305 TISSUE EXAM BY PATHOLOGIST: CPT | Performed by: STUDENT IN AN ORGANIZED HEALTH CARE EDUCATION/TRAINING PROGRAM

## 2024-05-21 PROCEDURE — 2500000004 HC RX 250 GENERAL PHARMACY W/ HCPCS (ALT 636 FOR OP/ED): Performed by: ANESTHESIOLOGY

## 2024-05-21 PROCEDURE — 2500000004 HC RX 250 GENERAL PHARMACY W/ HCPCS (ALT 636 FOR OP/ED): Performed by: NURSE ANESTHETIST, CERTIFIED REGISTERED

## 2024-05-21 PROCEDURE — 82947 ASSAY GLUCOSE BLOOD QUANT: CPT

## 2024-05-21 PROCEDURE — 88305 TISSUE EXAM BY PATHOLOGIST: CPT | Mod: TC,59,ELYLAB | Performed by: INTERNAL MEDICINE

## 2024-05-21 PROCEDURE — 43239 EGD BIOPSY SINGLE/MULTIPLE: CPT | Performed by: INTERNAL MEDICINE

## 2024-05-21 PROCEDURE — 3700000001 HC GENERAL ANESTHESIA TIME - INITIAL BASE CHARGE

## 2024-05-21 RX ORDER — PROPOFOL 10 MG/ML
INJECTION, EMULSION INTRAVENOUS AS NEEDED
Status: DISCONTINUED | OUTPATIENT
Start: 2024-05-21 | End: 2024-05-21

## 2024-05-21 RX ORDER — SODIUM CHLORIDE, SODIUM LACTATE, POTASSIUM CHLORIDE, CALCIUM CHLORIDE 600; 310; 30; 20 MG/100ML; MG/100ML; MG/100ML; MG/100ML
50 INJECTION, SOLUTION INTRAVENOUS CONTINUOUS
Status: DISCONTINUED | OUTPATIENT
Start: 2024-05-21 | End: 2024-05-22 | Stop reason: HOSPADM

## 2024-05-21 RX ADMIN — PROPOFOL 100 MG: 10 INJECTION, EMULSION INTRAVENOUS at 12:52

## 2024-05-21 RX ADMIN — PROPOFOL 150 MG: 10 INJECTION, EMULSION INTRAVENOUS at 12:27

## 2024-05-21 RX ADMIN — PROPOFOL 100 MG: 10 INJECTION, EMULSION INTRAVENOUS at 12:42

## 2024-05-21 RX ADMIN — SODIUM CHLORIDE, POTASSIUM CHLORIDE, SODIUM LACTATE AND CALCIUM CHLORIDE 50 ML/HR: 600; 310; 30; 20 INJECTION, SOLUTION INTRAVENOUS at 11:48

## 2024-05-21 RX ADMIN — PROPOFOL 50 MG: 10 INJECTION, EMULSION INTRAVENOUS at 12:34

## 2024-05-21 RX ADMIN — PROPOFOL 100 MG: 10 INJECTION, EMULSION INTRAVENOUS at 12:48

## 2024-05-21 RX ADMIN — PROPOFOL 100 MG: 10 INJECTION, EMULSION INTRAVENOUS at 13:01

## 2024-05-21 SDOH — HEALTH STABILITY: MENTAL HEALTH: CURRENT SMOKER: 0

## 2024-05-21 ASSESSMENT — COLUMBIA-SUICIDE SEVERITY RATING SCALE - C-SSRS
1. IN THE PAST MONTH, HAVE YOU WISHED YOU WERE DEAD OR WISHED YOU COULD GO TO SLEEP AND NOT WAKE UP?: NO
6. HAVE YOU EVER DONE ANYTHING, STARTED TO DO ANYTHING, OR PREPARED TO DO ANYTHING TO END YOUR LIFE?: NO
2. HAVE YOU ACTUALLY HAD ANY THOUGHTS OF KILLING YOURSELF?: NO

## 2024-05-21 ASSESSMENT — PAIN SCALES - GENERAL
PAINLEVEL_OUTOF10: 0 - NO PAIN
PAIN_LEVEL: 0
PAINLEVEL_OUTOF10: 0 - NO PAIN
PAINLEVEL_OUTOF10: 0 - NO PAIN

## 2024-05-21 ASSESSMENT — PAIN - FUNCTIONAL ASSESSMENT
PAIN_FUNCTIONAL_ASSESSMENT: 0-10

## 2024-05-21 NOTE — ANESTHESIA POSTPROCEDURE EVALUATION
Patient: Lalo Antony    Procedure Summary       Date: 05/21/24 Room / Location: [facility]    Anesthesia Start: 1220 Anesthesia Stop: 1307    Procedures:       COLONOSCOPY      EGD Diagnosis:       Diverticulitis of large intestine without perforation or abscess without bleeding      Abnormal loss of weight      Epigastric pain    Scheduled Providers: Rubens Fbaian MD; Roe Thompson MD; Mile Johnson, RN; Lupe Yu RN Responsible Provider: Roe Thompson MD    Anesthesia Type: MAC ASA Status: 3            Anesthesia Type: MAC    Vitals Value Taken Time   BP 93/51 05/21/24 1308   Temp 36.5 05/21/24 1308   Pulse 61 05/21/24 1308   Resp 18 05/21/24 1308   SpO2 100 05/21/24 1308       Anesthesia Post Evaluation    Patient location during evaluation: PACU  Patient participation: complete - patient participated  Level of consciousness: awake  Pain score: 0  Pain management: adequate  Airway patency: patent  Cardiovascular status: acceptable and stable  Respiratory status: acceptable  Hydration status: acceptable  Postoperative Nausea and Vomiting: none      No notable events documented.

## 2024-05-21 NOTE — Clinical Note
Patient tolerated procedure well. Appears comfortable with no complaints of pain. VS stable. Arousable prior to transport. Patient transported to [facility] via cart.  Report called              . Handoff completed

## 2024-05-21 NOTE — Clinical Note
Huddle and Timeout completed together with team. Patient wristband and ULISSES information verified.  Anesthesia safety check completedAnesthesia safety check completed

## 2024-05-21 NOTE — Clinical Note
Patient tolerated procedure well. Appears comfortable with no complaints of pain. VS stable. Arousable prior to transport. Patient transported to [facility] via cart.  Report called              . Handoff completed

## 2024-05-21 NOTE — DISCHARGE INSTRUCTIONS
Patient Instructions after a Colonoscopy      The anesthetics, sedatives or narcotics which were given to you today will be acting in your body for the next 24 hours, so you might feel a little sleepy or groggy.  This feeling should slowly wear off. Carefully read and follow the instructions.     You received sedation today:  - Do not drive or operate any machinery or power tools of any kind.   - No alcoholic beverages today, not even beer or wine.  - Do not make any important decisions or sign any legal documents.  - No over the counter medications that contain alcohol or that may cause drowsiness.  - Do not make any important decisions or sign any legal documents.    While it is common to experience mild to moderate abdominal distention, gas, or belching after your procedure, if any of these symptoms occur following discharge from the GI Lab or within one week of having your procedure, call the [facility] to be advised whether a visit to your nearest Urgent Care or Emergency Department is indicated.  Take this paper with you if you go.     - If you develop an allergic reaction to the medications that were given during your procedure such as difficulty breathing, rash, hives, severe nausea, vomiting or lightheadedness.  - If you experience chest pain, shortness of breath, severe abdominal pain, fevers and chills.  -If you develop signs and symptoms of bleeding such as blood in your spit, if your stools turn black, tarry, or bloody  - If you have not urinated within 8 hours following your procedure.  - If your IV site becomes painful, red, inflamed, or looks infected.    If you received a biopsy/polypectomy/sphincterotomy the following instructions apply below:    __ Do not use Aspirin containing products, non-steroidal medications or anti-coagulants for one week following your procedure. (Examples of these types of medications are: Advil, Arthrotec, Aleve, Coumadin, Ecotrin, Heparin, Ibuprofen,  Indocin, Motrin, Naprosyn, Nuprin, Plavix, Vioxx, and Voltarin, or their generic forms.  This list is not all-inclusive.  Check with your physician or pharmacist before resuming medications.)   __ Eat a soft diet today.  Avoid foods that are poorly digested for the next 24 hours.  These foods would include: nuts, beans, lettuce, red meats, and fried foods. Start with liquids and advance your diet as tolerated, gradually work up to eating solids.   __ Do not have a Barium Study or Enema for one week.    Your physician recommends the additional following instructions:    -You have a contact number available for emergencies. The signs and symptoms of potential delayed complications were discussed with you. You may return to normal activities tomorrow.  -Resume your previous diet.  -Continue your present medications.   -We are waiting for your pathology results.  -Your physician has recommended a repeat colonoscopy (date to be determined after pending pathology results are reviewed) for surveillance based on pathology results.  -The findings and recommendations have been discussed with you.  -The findings and recommendations were discussed with your family.  - Please see Medication Reconciliation Form for new medication/medications prescribed.       If you experience any problems or have any questions following discharge from the GI Lab, please call:  Before 5p.m.  [phone]  After 5p.m.    [phone]    Nurse Signature                                                                        Date___________________                                                                            Patient/Responsible Party Signature                                        Date___________________    Upper GI Endoscopy Discharge Instructions    About this topic  This procedure is done to view your upper gastrointestinal (GI) tract. This includes your throat and food pipe (esophagus). It also includes your stomach and the  first part of the small bowel. Some people have this test for problems like coughing or throwing up blood. Other people may be having bad belly pain or blood in their stool. You may be having trouble swallowing or problems with acid reflux.  Doctors often use this test to look for problems like:  Ulcers  Cancer or tumor growths  Internal bleeding  Swelling  Inflammation  Infection  Rosales's esophagus  Gastroesophageal reflux disease or GERD  Swallowing problems    What care is needed at home?  Ask your doctor what you need to do when you go home. Make sure you ask questions if you do not understand what the doctor says. This way you will know what you need to do.  Your throat may feel sore. Your belly may also feel bloated. Your doctor may give you drugs to help with pain.  Talk to your doctor about when it is safe for you to go back to eating your normal diet and taking your drugs. You can drink fluid once the numbing drugs in your throat wear off.  What follow-up care is needed?  Your doctor may ask you to make visits to the office to check on your progress. Be sure to keep these visits.  The results of this test may help your doctor understand what kind of problem you have with your upper GI tract. Together you can make a plan for more care.  What drugs may be needed?  The doctor may order drugs to:  Help with pain  Decrease the acid in your stomach  Will physical activity be limited?  You may need to limit your activity for the rest of the day. After that, you will likely be able to go back to your normal activities.  What changes to diet are needed?  Soft foods like pudding or soups may be easier to eat at first. Ask your doctor if you need to make any changes to your diet.  What problems could happen?  Painful swallowing  Upset stomach  Injury to food pipe  Throwing up  Tear in the esophagus  When do I need to call the doctor?  Signs of infection. These include a fever of 100.4°F (38°C) or higher,  chills.  Very bad belly pain  Throwing up blood  Your belly is hard and swollen  Trouble swallowing or breathing  Upset stomach and throwing up  Bloody or black tarry stools  Cough, shortness of breath, or chest pain  A crunching feeling under the skin in your neck  You are not feeling better in 2 to 3 days or you are feeling worse  Teach Back: Helping You Understand  The Teach Back Method helps you understand the information we are giving you. After you talk with the staff, tell them in your own words what you learned. This helps to make sure the staff has described each thing clearly. It also helps to explain things that may have been confusing. Before going home, make sure you can do these:  I can tell you about my procedure.  I can tell you what changes I need to make with my diet or drugs.  I can tell you what I will do if I have very bad belly pain, throwing up blood, or my belly is hard and swollen.  Where can I learn more?  [employer]  [email or web address]

## 2024-05-21 NOTE — ANESTHESIA PREPROCEDURE EVALUATION
Patient: Lalo Antony    Procedure Information       Date/Time: 05/21/24 1300    Scheduled providers: Rubens Fabian MD; Roe Thompson MD; Mile Johnson, RN; Lupe Yu, RN    Procedures:       COLONOSCOPY      EGD    Location: [facility]            Relevant Problems   Cardiac   (+) Acute myocardial infarction (Multi)   (+) Benign essential hypertension   (+) CAD S/P percutaneous coronary angioplasty   (+) Chest pain   (+) Hyperlipidemia, mixed      Neuro   (+) Epilepsy (Multi)   (+) Generalized anxiety disorder   (+) New daily persistent headache      GI   (+) GERD without esophagitis      /Renal   (+) Benign prostatic hyperplasia   (+) Urinary tract infection      Endocrine   (+) Class 1 obesity due to excess calories with serious comorbidity and body mass index (BMI) of 31.0 to 31.9 in adult   (+) Type 2 diabetes mellitus with other circulatory complication, without long-term current use of insulin (Multi)      ID   (+) Upper respiratory tract infection   (+) Urinary tract infection      Skin   (+) Eczema of hand   (+) Malignant basal cell neoplasm of skin   (+) Rash       Clinical information reviewed:   Tobacco  Allergies  Meds   Med Hx  Surg Hx   Fam Hx  Soc Hx        NPO Detail:  NPO/Void Status  Date of Last Liquid: 05/21/24  Time of Last Liquid: 0500  Date of Last Solid: 05/19/24  Time of Last Solid: 1900         Physical Exam    Airway  Mallampati: II     Cardiovascular - normal exam     Dental - normal exam     Pulmonary - normal exam     Abdominal - normal exam         Anesthesia Plan    History of general anesthesia?: yes  History of complications of general anesthesia?: no    ASA 3     MAC     The patient is not a current smoker.  Patient was not previously instructed to abstain from smoking on day of procedure.  Patient did not smoke on day of procedure.    intravenous induction   Anesthetic plan and risks discussed with patient.  Use of blood products discussed with  patient who.

## 2024-05-21 NOTE — H&P
Outpatient Hospital Procedure H&P    Patient Profile-Procedures  Initial Info  Patient Demographics  Name Lalo Antony  Date of Birth 1945  MRN [id number]  Address   [address] [id number] [place] [id number]    Primary Phone Number [phone]  Secondary Phone Number    PCP Lennox Lopez    Procedure(s):  EGD, colonoscopy  Primary contact name and number   Extended Emergency Contact Information  Primary Emergency Contact: Danae Antony  Home Phone: [phone]  Relation: Spouse  Secondary Emergency Contact: Rebecca Thomas  Address: [address] [place] [address]  Home Phone: [phone]  Mobile Phone: [phone]  Relation: Daughter    General Health  Weight   Vitals:    05/21/24 1143   Weight: 84.8 kg (186 lb 15.2 oz)     BMI Body mass index is 30.17 kg/m².    Allergies  Allergies   Allergen Reactions    Beta-Blockers (Beta-Adrenergic Blocking Agts) Unknown     DOES NOT REMEMBER    Rosuvastatin Rash and Swelling     severe muscle pain       Past Medical History   Past Medical History:   Diagnosis Date    Anxiety     Basal cell carcinoma     SKIN    BPH (benign prostatic hyperplasia)     Colon polyp     Coronary artery disease     COVID-19     VACCINATED    Difficulty breathing     Diverticulosis     Dizziness     Eczema     Epilepsy (Multi)     GERD (gastroesophageal reflux disease)     Hyperlipemia     Hypertension     Joint pain     Myocardial infarction (Multi)     New persistent daily headache     Palpitations     Pericardial effusion (HHS-HCC)     PONV (postoperative nausea and vomiting)     COMBATIVE AFTER LAST ANESTHESIA, FIRST EPISODE    Syncope     Type 2 diabetes mellitus (Multi)     Urinary tract infection     Wears dentures     Wears glasses        Provider assessment  Diagnosis: Epigastric pain, h/o polyps    Medication Reviewed - yes  Prior to Admission medications    Medication Sig Start Date End Date Taking? Authorizing  Provider   aspirin 81 mg chewable tablet Chew 1 tablet (81 mg) once daily.   Yes Historical Provider, MD   cholecalciferol (Vitamin D3) 25 MCG (1000 UT) capsule Take 1 capsule (25 mcg) by mouth once daily. 6/12/23 6/11/24 Yes Lennox Lopez MD   clonazePAM (KlonoPIN) 0.5 mg tablet Take 1 tablet (0.5 mg) by mouth 2 times a day. 5/15/24 7/14/24 Yes Lennox Lopez MD   clopidogrel (Plavix) 75 mg tablet Take 1 tablet (75 mg) by mouth once daily. 11/24/23 11/23/24 Yes Jeanmarie Cooper MD   dicyclomine (Bentyl) 20 mg tablet Take 1 tablet (20 mg) by mouth 4 times a day as needed (abdominal pain or cramps). 4/25/24 4/25/25 Yes Lennox Lopez MD   Dilantin Extended 100 mg capsule Take 1 capsule (100 mg) by mouth 3 times a day. 2/26/24  Yes Lennox Lopez MD   ezetimibe (Zetia) 10 mg tablet Take 1 tablet (10 mg) by mouth once daily. 3/11/24  Yes Lennox Lopez MD   furosemide (Lasix) 40 mg tablet TAKE 1 TABLET BY MOUTH ONCE DAILY 4/23/24 4/23/25 Yes Jeanmarie Cooper MD   isosorbide mononitrate ER (Imdur) 30 mg 24 hr tablet TAKE 1 TABLET BY MOUTH ONCE DAILY 7/7/23 7/7/24 Yes Lennox Lopez MD   losartan (Cozaar) 50 mg tablet Take 1 tablet (50 mg) by mouth once daily. 2/26/24  Yes Lennox Lopez MD   magnesium oxide (Mag-Ox) 400 mg (241.3 mg magnesium) tablet Take 1 tablet (400 mg) by mouth once daily.   Yes Historical Provider, MD   ondansetron (Zofran) 4 mg tablet Take 1 tablet (4 mg) by mouth every 8 hours if needed for nausea or vomiting for up to 7 days. 5/8/24 5/21/24 Yes Lennox Lopez MD   simvastatin (Zocor) 40 mg tablet TAKE 1 TABLET BY MOUTH EVERY NIGHT AT BEDTIME 9/7/23 9/6/24 Yes Jeanmarie Cooper MD   metFORMIN (Glucophage) 500 mg tablet Take 1 tablet (500 mg) by mouth once daily with breakfast  Patient not taking: Reported on 4/25/2024 11/16/23 11/15/24  Lennox Lopez MD   nitroglycerin (Nitrostat) 0.4 mg SL tablet DISSOLVE 1 TABLET UNDER TONGUE EVERY 5 MINUTES FOR UP TO 3 DOSES AS NEEDED FOR CHEST PAIN. IF  PAIN PERSISTS DIAL 911 OR GO TO ER. 6/29/23 6/28/24  Jeanmarie Cooper MD   pantoprazole (ProtoNix) 40 mg EC tablet Take 1 tablet (40 mg) by mouth once daily. Do not crush, chew, or split. 3/28/24 5/29/24  Lennox Lopez MD   clonazePAM (KlonoPIN) 0.5 mg tablet Take 1 tablet (0.5 mg) by mouth 2 times a day. 4/25/24 5/15/24  Lennox Lopez MD   nystatin (Mycostatin) 100,000 unit/gram powder Apply 1 Application topically 2 times a day. 3/28/24 5/20/24  Lennox Lopez MD       Physical Exam  Vitals:    05/21/24 1143   BP: 150/74   Pulse: 77   Resp: 18   Temp: 36.2 °C (97.2 °F)   SpO2: 97%        General: A&Ox3, NAD.  CV: RRR. No murmur.  Resp: CTA bilaterally. No wheezing, rhonchi or rales.   Extrem: No edema.       Oropharyngeal Classification III (soft and hard palate and base of uvula visible)  ASA PS Classification 3  Sedation Plan Deep  Procedure Plan - pre-procedural (re)assesment completed by physician:  discharge/transfer patient when discharge criteria met    Rubens Fabian MD  5/21/2024 11:47 AM

## 2024-05-22 ENCOUNTER — PHARMACY VISIT (OUTPATIENT)
Dept: PHARMACY | Facility: CLINIC | Age: 79
End: 2024-05-22
Payer: COMMERCIAL

## 2024-05-22 DIAGNOSIS — R10.10 UPPER ABDOMINAL PAIN: ICD-10-CM

## 2024-05-22 PROCEDURE — RXMED WILLOW AMBULATORY MEDICATION CHARGE

## 2024-05-22 RX ORDER — PANTOPRAZOLE SODIUM 40 MG/1
40 TABLET, DELAYED RELEASE ORAL DAILY
Qty: 30 TABLET | Refills: 1 | OUTPATIENT
Start: 2024-05-22

## 2024-05-22 RX ORDER — PANTOPRAZOLE SODIUM 40 MG/1
40 TABLET, DELAYED RELEASE ORAL DAILY
Qty: 30 TABLET | Refills: 1 | Status: SHIPPED | OUTPATIENT
Start: 2024-05-22 | End: 2024-06-04 | Stop reason: WASHOUT

## 2024-05-29 LAB
LABORATORY COMMENT REPORT: NORMAL
PATH REPORT.FINAL DX SPEC: NORMAL
PATH REPORT.GROSS SPEC: NORMAL
PATH REPORT.TOTAL CANCER: NORMAL

## 2024-06-04 ENCOUNTER — OFFICE VISIT (OUTPATIENT)
Dept: PRIMARY CARE | Facility: CLINIC | Age: 79
End: 2024-06-04
Payer: COMMERCIAL

## 2024-06-04 ENCOUNTER — HOSPITAL ENCOUNTER (OUTPATIENT)
Dept: RADIOLOGY | Facility: HOSPITAL | Age: 79
Discharge: HOME | End: 2024-06-04
Payer: COMMERCIAL

## 2024-06-04 ENCOUNTER — PHARMACY VISIT (OUTPATIENT)
Dept: PHARMACY | Facility: CLINIC | Age: 79
End: 2024-06-04
Payer: COMMERCIAL

## 2024-06-04 ENCOUNTER — LAB (OUTPATIENT)
Dept: LAB | Facility: LAB | Age: 79
End: 2024-06-04
Payer: COMMERCIAL

## 2024-06-04 VITALS
DIASTOLIC BLOOD PRESSURE: 80 MMHG | SYSTOLIC BLOOD PRESSURE: 120 MMHG | TEMPERATURE: 96.6 F | HEART RATE: 68 BPM | WEIGHT: 178 LBS | HEIGHT: 66 IN | BODY MASS INDEX: 28.61 KG/M2

## 2024-06-04 DIAGNOSIS — F41.1 GENERALIZED ANXIETY DISORDER: ICD-10-CM

## 2024-06-04 DIAGNOSIS — R10.84 GENERALIZED ABDOMINAL PAIN: ICD-10-CM

## 2024-06-04 DIAGNOSIS — I25.10 CAD S/P PERCUTANEOUS CORONARY ANGIOPLASTY: ICD-10-CM

## 2024-06-04 DIAGNOSIS — R63.4 UNEXPLAINED WEIGHT LOSS: Primary | ICD-10-CM

## 2024-06-04 DIAGNOSIS — Z98.61 CAD S/P PERCUTANEOUS CORONARY ANGIOPLASTY: ICD-10-CM

## 2024-06-04 DIAGNOSIS — R10.13 EPIGASTRIC PAIN: ICD-10-CM

## 2024-06-04 DIAGNOSIS — I10 BENIGN ESSENTIAL HYPERTENSION: ICD-10-CM

## 2024-06-04 LAB
CREAT SERPL-MCNC: 0.94 MG/DL (ref 0.5–1.3)
EGFRCR SERPLBLD CKD-EPI 2021: 82 ML/MIN/1.73M*2

## 2024-06-04 PROCEDURE — 76705 ECHO EXAM OF ABDOMEN: CPT

## 2024-06-04 PROCEDURE — RXMED WILLOW AMBULATORY MEDICATION CHARGE

## 2024-06-04 PROCEDURE — 3079F DIAST BP 80-89 MM HG: CPT | Performed by: INTERNAL MEDICINE

## 2024-06-04 PROCEDURE — 3074F SYST BP LT 130 MM HG: CPT | Performed by: INTERNAL MEDICINE

## 2024-06-04 PROCEDURE — 99214 OFFICE O/P EST MOD 30 MIN: CPT | Performed by: INTERNAL MEDICINE

## 2024-06-04 PROCEDURE — 36415 COLL VENOUS BLD VENIPUNCTURE: CPT

## 2024-06-04 PROCEDURE — 1159F MED LIST DOCD IN RCRD: CPT | Performed by: INTERNAL MEDICINE

## 2024-06-04 PROCEDURE — 82565 ASSAY OF CREATININE: CPT

## 2024-06-04 PROCEDURE — 1160F RVW MEDS BY RX/DR IN RCRD: CPT | Performed by: INTERNAL MEDICINE

## 2024-06-04 PROCEDURE — 76705 ECHO EXAM OF ABDOMEN: CPT | Performed by: RADIOLOGY

## 2024-06-04 RX ORDER — CLONAZEPAM 0.5 MG/1
0.5 TABLET ORAL 2 TIMES DAILY
Qty: 60 TABLET | Refills: 0 | Status: SHIPPED | OUTPATIENT
Start: 2024-06-04 | End: 2024-08-03

## 2024-06-04 ASSESSMENT — ENCOUNTER SYMPTOMS
ANOREXIA: 1
DIARRHEA: 0
UNEXPECTED WEIGHT CHANGE: 1
ABDOMINAL PAIN: 1
VOMITING: 0
RESPIRATORY NEGATIVE: 1
NAUSEA: 0
DIZZINESS: 0
ARTHRALGIAS: 0
CARDIOVASCULAR NEGATIVE: 1
NERVOUS/ANXIOUS: 1
CONSTIPATION: 0

## 2024-06-04 ASSESSMENT — CROHNS DISEASE ACTIVITY INDEX (CDAI): CDAI SCORE: 0

## 2024-06-04 NOTE — PROGRESS NOTES
Subjective   Patient ID: Lalo Antony is a 78 y.o. male who presents for Follow-up (3 mo fu and discuss weight issues and procedures).    Abdominal Pain  This is a recurrent problem. The current episode started more than 1 month ago. The onset quality is gradual. The problem occurs daily. The problem has been unchanged. The pain is located in the periumbilical region and generalized abdominal region. The pain is at a severity of 3/10. The pain is moderate. The abdominal pain radiates to the periumbilical region and epigastric region. Associated symptoms include anorexia. Pertinent negatives include no arthralgias, constipation, diarrhea, nausea or vomiting. The treatment provided no relief. Prior diagnostic workup includes CT scan, GI consult, lower endoscopy, upper endoscopy and ultrasound. There is no history of abdominal surgery, colon cancer or Crohn's disease.      Anxiety  Presents for follow-up visit. Patient reports no chest pain, confusion, decreased concentration, depressed mood, dizziness, dry mouth, excessive worry, nausea or shortness of breath. Symptoms occur occasionally. The quality of sleep is fair.   No data recorded  I have personally reviewed the OARRS report for Lalo Antony. I have considered the risks of abuse, dependence, addiction and diversion    Is the patient prescribed a combination of a benzodiazepine and opioid?  No    Last Urine Drug Screen / ordered today: Yes  Recent Results (from the past 61191 hour(s))   Drug Screen, Urine With Reflex to Confirmation    Collection Time: 09/23/22  9:44 AM   Result Value Ref Range    DRUG SCREEN COMMENT URINE SEE BELOW     Amphetamine Screen, Urine PRESUMPTIVE NEGATIVE NEGATIVE    Barbiturate Screen, Urine PRESUMPTIVE NEGATIVE NEGATIVE    BENZODIAZEPINE (PRESENCE) IN URINE BY SCREEN METHOD PRESUMPTIVE NEGATIVE NEGATIVE    Cannabinoid Screen, Urine PRESUMPTIVE NEGATIVE NEGATIVE    Cocaine Screen, Urine PRESUMPTIVE NEGATIVE NEGATIVE     Fentanyl, Ur PRESUMPTIVE NEGATIVE NEGATIVE    Methadone Screen, Urine PRESUMPTIVE NEGATIVE NEGATIVE    Opiate Screen, Urine PRESUMPTIVE NEGATIVE NEGATIVE    Oxycodone Screen, Ur PRESUMPTIVE NEGATIVE NEGATIVE    PCP Screen, Urine PRESUMPTIVE NEGATIVE NEGATIVE     Results are as expected.     Controlled Substance Agreement:  Date of the Last Agreement: 8/14/2023  Reviewed Controlled Substance Agreement including but not limited to the benefits, risks, and alternatives to treatment with a Controlled Substance medication(s).    Benzodiazepines:  What is the patient's goal of therapy? Less anxiety  Is this being achieved with current treatment? yes    CHARLETTE-7:  No data recorded    Activities of Daily Living:   Is your overall impression that this patient is benefiting (symptom reduction outweighs side effects) from benzodiazepine therapy? Yes     1. Physical Functioning: Same  2. Family Relationship: Same  3. Social Relationship: Same  4. Mood: Better  5. Sleep Patterns: poor  6. Overall Function:poor    Hypertension  This is a chronic problem. The current episode started more than 1 year ago. The problem has been waxing and waning since onset. The problem is controlled. Associated symptoms include anxiety. Pertinent negatives include no chest pain or shortness of breath. Past treatments include beta blockers and calcium channel blockers. The current treatment provides moderate improvement. There are no compliance problems.  Review of Systems   Constitutional:  Positive for unexpected weight change.   Respiratory: Negative.     Cardiovascular: Negative.    Gastrointestinal:  Positive for abdominal pain and anorexia. Negative for constipation, diarrhea, nausea and vomiting.   Musculoskeletal:  Negative for arthralgias.   Skin: Negative.    Neurological:  Negative for dizziness.   Psychiatric/Behavioral:  The patient is nervous/anxious.        Objective   /80 (BP Location: Left arm, Patient Position: Sitting, BP Cuff  "Size: Adult)   Pulse 68   Temp 35.9 °C (96.6 °F) (Temporal)   Ht 1.676 m (5' 6\")   Wt 80.7 kg (178 lb)   BMI 28.73 kg/m²     Physical Exam  Vitals reviewed.   Constitutional:       Appearance: Normal appearance. He is overweight.   HENT:      Head: Normocephalic and atraumatic.   Eyes:      Conjunctiva/sclera: Conjunctivae normal.   Cardiovascular:      Rate and Rhythm: Normal rate and regular rhythm.      Pulses: Normal pulses.   Pulmonary:      Effort: Pulmonary effort is normal.      Breath sounds: Normal breath sounds.   Abdominal:      Palpations: Abdomen is soft.      Tenderness: There is abdominal tenderness. There is no guarding.   Musculoskeletal:         General: Normal range of motion.      Cervical back: Normal range of motion.   Skin:     General: Skin is warm and dry.   Neurological:      General: No focal deficit present.   Psychiatric:         Mood and Affect: Mood is anxious.       Assessment/Plan   Problem List Items Addressed This Visit             ICD-10-CM    CAD S/P percutaneous coronary angioplasty I25.10, Z98.61    Benign essential hypertension I10    Generalized anxiety disorder F41.1    Relevant Medications    clonazePAM (KlonoPIN) 0.5 mg tablet    Generalized abdominal pain R10.84    Unexplained weight loss - Primary R63.4   We cannot figure out his abdominal pain and he says he cannot eat he says whenever he eats his stomach hurts, endoscopy, colonoscopy, splanchnic vascular studies nothing is showing anything so I really have been surprised about this abdominal complaint which has caused almost 60 pound weight loss, spouse was present today, I assured patient that since there is no significant findings on the multiple GI workup patient should be not afraid to eat, he should eat, there is no luminal pathology going on, to complete the workup we will do CT enterography small intestinal imaging has not been done fully and properly, gallbladder ultrasound has been done, he remains on " Klonopin, I told that we are not able to find any other diagnosis to explain the symptoms, exhausted GI workup has been done including gastroenterology and myself.  Abdominal pain and anorexia remains unexplained.  Klonopin to be continued OARRS report was reviewed, weight loss should not happen anymore, anxiety is out of control, dicyclomine and PPI to be continued, I told patient and patient's spouse that we are not able to find what exactly is going on and there is always a chance to get second opinion in the future.  Lets see gallbladder ultrasound report and CT enterography was ordered, follow-up in 3 months.

## 2024-06-05 ENCOUNTER — APPOINTMENT (OUTPATIENT)
Dept: GASTROENTEROLOGY | Facility: CLINIC | Age: 79
End: 2024-06-05
Payer: COMMERCIAL

## 2024-06-10 LAB
FUNGUS SPEC CULT: NORMAL
FUNGUS SPEC FUNGUS STN: NORMAL

## 2024-06-13 ENCOUNTER — APPOINTMENT (OUTPATIENT)
Dept: SURGERY | Facility: CLINIC | Age: 79
End: 2024-06-13
Payer: COMMERCIAL

## 2024-06-20 ENCOUNTER — APPOINTMENT (OUTPATIENT)
Dept: SURGERY | Facility: CLINIC | Age: 79
End: 2024-06-20
Payer: COMMERCIAL

## 2024-06-20 VITALS
HEIGHT: 67 IN | WEIGHT: 169 LBS | HEART RATE: 76 BPM | SYSTOLIC BLOOD PRESSURE: 118 MMHG | BODY MASS INDEX: 26.53 KG/M2 | DIASTOLIC BLOOD PRESSURE: 68 MMHG

## 2024-06-20 DIAGNOSIS — K80.20 GALLSTONES: Primary | ICD-10-CM

## 2024-06-20 PROCEDURE — 99203 OFFICE O/P NEW LOW 30 MIN: CPT | Performed by: SURGERY

## 2024-06-20 PROCEDURE — 3074F SYST BP LT 130 MM HG: CPT | Performed by: SURGERY

## 2024-06-20 PROCEDURE — 3078F DIAST BP <80 MM HG: CPT | Performed by: SURGERY

## 2024-06-20 PROCEDURE — 1159F MED LIST DOCD IN RCRD: CPT | Performed by: SURGERY

## 2024-06-20 RX ORDER — CEFAZOLIN SODIUM 2 G/100ML
2 INJECTION, SOLUTION INTRAVENOUS ONCE
OUTPATIENT
Start: 2024-06-20 | End: 2024-06-20

## 2024-06-20 RX ORDER — SODIUM CHLORIDE, SODIUM LACTATE, POTASSIUM CHLORIDE, CALCIUM CHLORIDE 600; 310; 30; 20 MG/100ML; MG/100ML; MG/100ML; MG/100ML
100 INJECTION, SOLUTION INTRAVENOUS CONTINUOUS
OUTPATIENT
Start: 2024-06-20

## 2024-06-20 RX ORDER — HEPARIN SODIUM 5000 [USP'U]/ML
5000 INJECTION, SOLUTION INTRAVENOUS; SUBCUTANEOUS ONCE
OUTPATIENT
Start: 2024-06-20 | End: 2024-06-20

## 2024-06-20 ASSESSMENT — ENCOUNTER SYMPTOMS
ABDOMINAL PAIN: 1
ABDOMINAL DISTENTION: 1
UNEXPECTED WEIGHT CHANGE: 1
FATIGUE: 1

## 2024-06-20 NOTE — H&P (VIEW-ONLY)
Subjective   Patient ID: Lalo Antony is a 78 y.o. male who presents for Consult (Consult gallstones).  HPI  78-year-old male with long history of epigastric right upper quadrant pain especially after eating with significant weight loss due to discomfort after he eats referred for surgical treatment if indicated patient's wife works in the hospital  Review of Systems   Constitutional:  Positive for fatigue and unexpected weight change.   Gastrointestinal:  Positive for abdominal distention and abdominal pain.   All other systems reviewed and are negative.      Objective   Physical Exam  Abdominal:      Comments: Abdomen is soft tender right upper quadrant       Physical Exam  Constitutional:       Appearance: Normal appearance.   HENT:      Head: Normocephalic and atraumatic.      Mouth/Throat:      Pharynx: Oropharynx is clear.   Eyes:      Pupils: Pupils are equal, round, and reactive to light.   Cardiovascular:      Rate and Rhythm: Normal rate and regular rhythm.   Pulmonary:      Effort: Pulmonary effort is normal.      Breath sounds: Normal breath sounds.   Abdominal:      General: Abdomen is flat. Bowel sounds are normal.      Palpations: Abdomen is soft.   Musculoskeletal:         General: Normal range of motion.      Cervical back: Normal range of motion.   Skin:     General: Skin is warm.   Neurological:      General: No focal deficit present.      Mental Status: She is alert. Mental status is at baseline.   Psychiatric:         Mood and Affect: Mood normal.     Assessment/Plan   Patient with right upper quadrant pain extensive workup including EGD colonoscopy ultrasound shows stones and patient is symptomatic and tender in the area recommend cholecystectomy laparoscopic possible open the procedure risks benefits alternatives discussed in detail the patient and wife agree to proceed at this time.  Possibly bleeding open surgery bile duct injury reviewed carefully and serially with him.  Stop Plavix 1  week before clearance by cardiology needed         Solis Cade MD 06/20/24 3:26 PM

## 2024-06-24 ENCOUNTER — TELEPHONE (OUTPATIENT)
Dept: CARDIOLOGY | Facility: CLINIC | Age: 79
End: 2024-06-24
Payer: COMMERCIAL

## 2024-06-24 NOTE — TELEPHONE ENCOUNTER
Clearance form received from . Chair office regarding below.     Placed for Dr. Chris Valdez MD  signature today.     Faxed with confirmation received.

## 2024-06-24 NOTE — TELEPHONE ENCOUNTER
Spouse called and LM stating Dr. Cade's office needs cardiac clearance to stop Plavix for a laparoscopic cholecystectomy on Monday 7/1/24. Per Dr. Chris Mojica MD in the absence of Dr. Jeanmarie Cooper MD, [facility], patient is cleared for surgery and can stop Plavix for 7 days prior. Called patient to inform and LM. Advised patient to call office back with any other questions. A message will also be sent to the patient via [employer].

## 2024-06-26 NOTE — PREPROCEDURE INSTRUCTIONS
AWARE TO TAKE SEIZURE MEDICATION ON DAY OF PROCEDURE                    NPO Instructions:    Do not eat any food after midnight the night before your surgery/procedure.    Additional Instructions:

## 2024-06-30 ENCOUNTER — ANESTHESIA EVENT (OUTPATIENT)
Dept: OPERATING ROOM | Facility: HOSPITAL | Age: 79
End: 2024-06-30
Payer: COMMERCIAL

## 2024-06-30 RX ORDER — OXYCODONE HYDROCHLORIDE 5 MG/1
5 TABLET ORAL EVERY 4 HOURS PRN
Status: CANCELLED | OUTPATIENT
Start: 2024-06-30

## 2024-06-30 RX ORDER — SODIUM CHLORIDE, SODIUM LACTATE, POTASSIUM CHLORIDE, CALCIUM CHLORIDE 600; 310; 30; 20 MG/100ML; MG/100ML; MG/100ML; MG/100ML
100 INJECTION, SOLUTION INTRAVENOUS CONTINUOUS
Status: CANCELLED | OUTPATIENT
Start: 2024-06-30

## 2024-07-01 ENCOUNTER — APPOINTMENT (OUTPATIENT)
Dept: RADIOLOGY | Facility: HOSPITAL | Age: 79
End: 2024-07-01
Payer: COMMERCIAL

## 2024-07-01 ENCOUNTER — PHARMACY VISIT (OUTPATIENT)
Dept: PHARMACY | Facility: CLINIC | Age: 79
End: 2024-07-01
Payer: COMMERCIAL

## 2024-07-01 ENCOUNTER — ANESTHESIA (OUTPATIENT)
Dept: OPERATING ROOM | Facility: HOSPITAL | Age: 79
End: 2024-07-01
Payer: COMMERCIAL

## 2024-07-01 ENCOUNTER — HOSPITAL ENCOUNTER (OUTPATIENT)
Facility: HOSPITAL | Age: 79
Setting detail: OUTPATIENT SURGERY
Discharge: HOME | End: 2024-07-01
Attending: SURGERY | Admitting: SURGERY
Payer: COMMERCIAL

## 2024-07-01 VITALS
HEART RATE: 74 BPM | TEMPERATURE: 96.8 F | RESPIRATION RATE: 16 BRPM | WEIGHT: 166.89 LBS | HEIGHT: 67 IN | OXYGEN SATURATION: 94 % | SYSTOLIC BLOOD PRESSURE: 171 MMHG | BODY MASS INDEX: 26.19 KG/M2 | DIASTOLIC BLOOD PRESSURE: 82 MMHG

## 2024-07-01 DIAGNOSIS — K80.20 GALLSTONES: Primary | ICD-10-CM

## 2024-07-01 DIAGNOSIS — F41.1 GENERALIZED ANXIETY DISORDER: ICD-10-CM

## 2024-07-01 LAB
GLUCOSE BLD MANUAL STRIP-MCNC: 117 MG/DL (ref 74–99)
GLUCOSE BLD MANUAL STRIP-MCNC: 131 MG/DL (ref 74–99)

## 2024-07-01 PROCEDURE — 2500000004 HC RX 250 GENERAL PHARMACY W/ HCPCS (ALT 636 FOR OP/ED): Performed by: SURGERY

## 2024-07-01 PROCEDURE — RXMED WILLOW AMBULATORY MEDICATION CHARGE

## 2024-07-01 PROCEDURE — 7100000010 HC PHASE TWO TIME - EACH INCREMENTAL 1 MINUTE: Performed by: SURGERY

## 2024-07-01 PROCEDURE — 2500000004 HC RX 250 GENERAL PHARMACY W/ HCPCS (ALT 636 FOR OP/ED): Mod: JZ | Performed by: SURGERY

## 2024-07-01 PROCEDURE — 47563 LAPARO CHOLECYSTECTOMY/GRAPH: CPT | Performed by: SURGERY

## 2024-07-01 PROCEDURE — 2720000007 HC OR 272 NO HCPCS: Performed by: SURGERY

## 2024-07-01 PROCEDURE — 2500000004 HC RX 250 GENERAL PHARMACY W/ HCPCS (ALT 636 FOR OP/ED): Performed by: NURSE ANESTHETIST, CERTIFIED REGISTERED

## 2024-07-01 PROCEDURE — 96372 THER/PROPH/DIAG INJ SC/IM: CPT | Performed by: SURGERY

## 2024-07-01 PROCEDURE — 2500000004 HC RX 250 GENERAL PHARMACY W/ HCPCS (ALT 636 FOR OP/ED): Performed by: ANESTHESIOLOGY

## 2024-07-01 PROCEDURE — 82947 ASSAY GLUCOSE BLOOD QUANT: CPT

## 2024-07-01 PROCEDURE — 3600000008 HC OR TIME - EACH INCREMENTAL 1 MINUTE - PROCEDURE LEVEL THREE: Performed by: SURGERY

## 2024-07-01 PROCEDURE — 88304 TISSUE EXAM BY PATHOLOGIST: CPT | Mod: TC,ELYLAB | Performed by: SURGERY

## 2024-07-01 PROCEDURE — 88304 TISSUE EXAM BY PATHOLOGIST: CPT | Performed by: STUDENT IN AN ORGANIZED HEALTH CARE EDUCATION/TRAINING PROGRAM

## 2024-07-01 PROCEDURE — 3600000003 HC OR TIME - INITIAL BASE CHARGE - PROCEDURE LEVEL THREE: Performed by: SURGERY

## 2024-07-01 PROCEDURE — 3700000002 HC GENERAL ANESTHESIA TIME - EACH INCREMENTAL 1 MINUTE: Performed by: SURGERY

## 2024-07-01 PROCEDURE — 2550000001 HC RX 255 CONTRASTS: Performed by: SURGERY

## 2024-07-01 PROCEDURE — 74300 X-RAY BILE DUCTS/PANCREAS: CPT

## 2024-07-01 PROCEDURE — 7100000002 HC RECOVERY ROOM TIME - EACH INCREMENTAL 1 MINUTE: Performed by: SURGERY

## 2024-07-01 PROCEDURE — 2500000005 HC RX 250 GENERAL PHARMACY W/O HCPCS: Performed by: SURGERY

## 2024-07-01 PROCEDURE — C1729 CATH, DRAINAGE: HCPCS | Performed by: SURGERY

## 2024-07-01 PROCEDURE — 7100000009 HC PHASE TWO TIME - INITIAL BASE CHARGE: Performed by: SURGERY

## 2024-07-01 PROCEDURE — 2780000003 HC OR 278 NO HCPCS: Performed by: SURGERY

## 2024-07-01 PROCEDURE — 3700000001 HC GENERAL ANESTHESIA TIME - INITIAL BASE CHARGE: Performed by: SURGERY

## 2024-07-01 PROCEDURE — 2500000005 HC RX 250 GENERAL PHARMACY W/O HCPCS: Performed by: NURSE ANESTHETIST, CERTIFIED REGISTERED

## 2024-07-01 PROCEDURE — 7100000001 HC RECOVERY ROOM TIME - INITIAL BASE CHARGE: Performed by: SURGERY

## 2024-07-01 RX ORDER — PROPOFOL 10 MG/ML
INJECTION, EMULSION INTRAVENOUS AS NEEDED
Status: DISCONTINUED | OUTPATIENT
Start: 2024-07-01 | End: 2024-07-01

## 2024-07-01 RX ORDER — ONDANSETRON HYDROCHLORIDE 2 MG/ML
INJECTION, SOLUTION INTRAVENOUS AS NEEDED
Status: DISCONTINUED | OUTPATIENT
Start: 2024-07-01 | End: 2024-07-01

## 2024-07-01 RX ORDER — LIDOCAINE HYDROCHLORIDE 20 MG/ML
INJECTION, SOLUTION INFILTRATION; PERINEURAL AS NEEDED
Status: DISCONTINUED | OUTPATIENT
Start: 2024-07-01 | End: 2024-07-01

## 2024-07-01 RX ORDER — FENTANYL CITRATE 50 UG/ML
50 INJECTION, SOLUTION INTRAMUSCULAR; INTRAVENOUS EVERY 5 MIN PRN
Status: DISCONTINUED | OUTPATIENT
Start: 2024-07-01 | End: 2024-07-01 | Stop reason: HOSPADM

## 2024-07-01 RX ORDER — HYDROCODONE BITARTRATE AND ACETAMINOPHEN 5; 325 MG/1; MG/1
1 TABLET ORAL EVERY 6 HOURS PRN
Qty: 20 TABLET | Refills: 0 | Status: SHIPPED | OUTPATIENT
Start: 2024-07-01 | End: 2024-07-19 | Stop reason: HOSPADM

## 2024-07-01 RX ORDER — ONDANSETRON HYDROCHLORIDE 2 MG/ML
4 INJECTION, SOLUTION INTRAVENOUS ONCE AS NEEDED
Status: COMPLETED | OUTPATIENT
Start: 2024-07-01 | End: 2024-07-01

## 2024-07-01 RX ORDER — SODIUM CHLORIDE, SODIUM LACTATE, POTASSIUM CHLORIDE, CALCIUM CHLORIDE 600; 310; 30; 20 MG/100ML; MG/100ML; MG/100ML; MG/100ML
100 INJECTION, SOLUTION INTRAVENOUS CONTINUOUS
Status: DISCONTINUED | OUTPATIENT
Start: 2024-07-01 | End: 2024-07-01 | Stop reason: HOSPADM

## 2024-07-01 RX ORDER — SODIUM CHLORIDE 0.9 G/100ML
IRRIGANT IRRIGATION AS NEEDED
Status: DISCONTINUED | OUTPATIENT
Start: 2024-07-01 | End: 2024-07-01 | Stop reason: HOSPADM

## 2024-07-01 RX ORDER — MIDAZOLAM HYDROCHLORIDE 1 MG/ML
INJECTION, SOLUTION INTRAMUSCULAR; INTRAVENOUS AS NEEDED
Status: DISCONTINUED | OUTPATIENT
Start: 2024-07-01 | End: 2024-07-01

## 2024-07-01 RX ORDER — CEFAZOLIN SODIUM 2 G/50ML
2 SOLUTION INTRAVENOUS ONCE
Status: DISCONTINUED | OUTPATIENT
Start: 2024-07-01 | End: 2024-07-01

## 2024-07-01 RX ORDER — HEPARIN SODIUM 5000 [USP'U]/ML
5000 INJECTION, SOLUTION INTRAVENOUS; SUBCUTANEOUS ONCE
Status: COMPLETED | OUTPATIENT
Start: 2024-07-01 | End: 2024-07-01

## 2024-07-01 RX ORDER — BUPIVACAINE HCL/EPINEPHRINE 0.25-.0005
VIAL (ML) INJECTION AS NEEDED
Status: DISCONTINUED | OUTPATIENT
Start: 2024-07-01 | End: 2024-07-01 | Stop reason: HOSPADM

## 2024-07-01 RX ORDER — MEPERIDINE HYDROCHLORIDE 25 MG/ML
12.5 INJECTION INTRAMUSCULAR; INTRAVENOUS; SUBCUTANEOUS EVERY 10 MIN PRN
Status: DISCONTINUED | OUTPATIENT
Start: 2024-07-01 | End: 2024-07-01 | Stop reason: HOSPADM

## 2024-07-01 RX ORDER — CEFAZOLIN SODIUM 2 G/100ML
2 INJECTION, SOLUTION INTRAVENOUS ONCE
Status: COMPLETED | OUTPATIENT
Start: 2024-07-01 | End: 2024-07-01

## 2024-07-01 RX ORDER — FENTANYL CITRATE 50 UG/ML
INJECTION, SOLUTION INTRAMUSCULAR; INTRAVENOUS AS NEEDED
Status: DISCONTINUED | OUTPATIENT
Start: 2024-07-01 | End: 2024-07-01

## 2024-07-01 RX ORDER — CLONAZEPAM 0.5 MG/1
0.5 TABLET ORAL 2 TIMES DAILY
Qty: 60 TABLET | Refills: 0 | Status: SHIPPED | OUTPATIENT
Start: 2024-07-01 | End: 2024-08-30

## 2024-07-01 RX ORDER — ROCURONIUM BROMIDE 10 MG/ML
INJECTION, SOLUTION INTRAVENOUS AS NEEDED
Status: DISCONTINUED | OUTPATIENT
Start: 2024-07-01 | End: 2024-07-01

## 2024-07-01 SDOH — HEALTH STABILITY: MENTAL HEALTH: CURRENT SMOKER: 1

## 2024-07-01 ASSESSMENT — PAIN SCALES - GENERAL
PAINLEVEL_OUTOF10: 8
PAINLEVEL_OUTOF10: 0 - NO PAIN
PAINLEVEL_OUTOF10: 0 - NO PAIN
PAINLEVEL_OUTOF10: 4
PAINLEVEL_OUTOF10: 0 - NO PAIN
PAINLEVEL_OUTOF10: 8
PAINLEVEL_OUTOF10: 5 - MODERATE PAIN
PAINLEVEL_OUTOF10: 8
PAINLEVEL_OUTOF10: 8
PAIN_LEVEL: 7
PAINLEVEL_OUTOF10: 6
PAINLEVEL_OUTOF10: 0 - NO PAIN

## 2024-07-01 ASSESSMENT — PAIN - FUNCTIONAL ASSESSMENT
PAIN_FUNCTIONAL_ASSESSMENT: 0-10

## 2024-07-01 ASSESSMENT — PAIN DESCRIPTION - DESCRIPTORS: DESCRIPTORS: SHARP;STABBING

## 2024-07-01 ASSESSMENT — PAIN DESCRIPTION - LOCATION: LOCATION: ABDOMEN

## 2024-07-01 NOTE — ANESTHESIA PREPROCEDURE EVALUATION
Patient: Lalo Antony    Procedure Information       Date/Time: 07/01/24 1105    Procedure: Cholecystectomy Laparoscopy with Cholangiogram - DIABETIC -    Location: [facility]    Surgeons: Solis MARKS MD            Relevant Problems   Cardiac   (+) Benign essential hypertension   (+) CAD S/P percutaneous coronary angioplasty   (+) Hyperlipidemia, mixed      Neuro   (+) Epilepsy (Multi)   (+) Generalized anxiety disorder      GI   (+) GERD without esophagitis      /Renal   (+) Benign prostatic hyperplasia      Liver   (+) Gallstones      Endocrine   (+) Type 2 diabetes mellitus with other circulatory complication, without long-term current use of insulin (Multi)      Skin   (+) Malignant basal cell neoplasm of skin       Clinical information reviewed:   Tobacco  Allergies  Meds  Problems  Med Hx  Surg Hx   Fam Hx  Soc   Hx        NPO Detail:  NPO/Void Status  Carbohydrate Drink Given Prior to Surgery? : N  Date of Last Liquid: 06/30/24  Time of Last Liquid: 2130  Date of Last Solid: 06/29/24  Time of Last Solid: 1800  Last Intake Type: Light meal  Time of Last Void: 0700         Physical Exam    Airway  Mallampati: II  TM distance: >3 FB     Cardiovascular    Dental - normal exam     Pulmonary    Abdominal        Anesthesia Plan    History of general anesthesia?: yes  History of complications of general anesthesia?: yes    ASA 3     general     The patient is a current smoker.  Patient was previously instructed to abstain from smoking on day of procedure.  Patient did not smoke on day of procedure.    intravenous induction   Anesthetic plan and risks discussed with patient.    Plan discussed with CRNA.

## 2024-07-01 NOTE — ANESTHESIA PROCEDURE NOTES
Airway  Date/Time: 7/1/2024 1:04 PM  Urgency: elective    Airway not difficult    Staffing  Performed: CRNA   Authorized by: Rufus Martell MD    Performed by: GREGG Macdonald  Patient location during procedure: OR    Indications and Patient Condition  Indications for airway management: anesthesia  Spontaneous Ventilation: absent  Sedation level: deep  Preoxygenated: yes  Patient position: sniffing  MILS maintained throughout  Mask difficulty assessment: 2 - vent by mask + OA or adjuvant +/- NMBA  Planned trial extubation    Final Airway Details  Final airway type: endotracheal airway      Successful airway: ETT  Cuffed: yes   Successful intubation technique: direct laryngoscopy  Facilitating devices/methods: intubating stylet and cricoid pressure  Endotracheal tube insertion site: oral  Blade: Stewart  Blade size: #4  ETT size (mm): 7.5  Cormack-Lehane Classification: grade IIa - partial view of glottis  Placement verified by: chest auscultation and capnometry   Cuff volume (mL): 6  Measured from: lips  ETT to lips (cm): 23  Number of attempts at approach: 1  Ventilation between attempts: none  Number of other approaches attempted: 0

## 2024-07-01 NOTE — OP NOTE
Cholecystectomy Laparoscopy with Cholangiogram Operative Note     Date: 2024  OR Location: [facility]    Name: Lalo Antony, : 1945, Age: 78 y.o., MRN: [id number], Sex: male    Diagnosis  Pre-op Diagnosis     * Gallstones [K80.20] Post-op Diagnosis     * Gallstones [K80.20]     Procedures  Cholecystectomy Laparoscopy with Cholangiogram  43590 - DC LAPS SURG CHOLECYSTECTOMY W/CHOLANGIOGRAPHY    DC LAPS SURG CHOLECYSTECTOMY W/CHOLANGIOGRAPHY [25822]  Surgeons      * Solis Cade V - Primary    Resident/Fellow/Other Assistant:  Surgeons and Role:     * Cecy Pete PA-C - Assisting    Procedure Summary  Anesthesia: General  ASA: III  Anesthesia Staff: Anesthesiologist: Rufus Martell MD  CRNA: HARRISON Macdonald-CRNA  Estimated Blood Loss: Less than 5 cc mL  Intra-op Medications: Administrations occurring from 1105 to 1305 on 24:  * No intraprocedure medications in log *           Anesthesia Record               Intraprocedure I/O Totals          Intake    ceFAZolin in dextrose (iso-os) (Ancef) IVPB 2 g 100.00 mL    Total Intake 100 mL          Specimen:   ID Type Source Tests Collected by Time   1 : Gallbladder Tissue GALLBLADDER CHOLECYSTECTOMY SURGICAL PATHOLOGY EXAM Solis MARKS MD 2024 1414        Staff:   Circulator: China Jenkins Person: Geno         Drains and/or Catheters:   [REMOVED] NG/OG/Feeding Tube Gastric 16 Fr Center mouth (Removed)       Tourniquet Times:         Implants:     Findings: Normal IOC    Indications: Lalo Antony is an 78 y.o. male who is having surgery for Gallstones [K80.20].     The patient was seen in the preoperative area. The risks, benefits, complications, treatment options, non-operative alternatives, expected recovery and outcomes were discussed with the patient. The possibilities of reaction to medication, pulmonary aspiration, injury to surrounding structures, bleeding, recurrent infection, the need for additional procedures, failure to diagnose a  condition, and creating a complication requiring transfusion or operation were discussed with the patient. The patient concurred with the proposed plan, giving informed consent.  The site of surgery was properly noted/marked if necessary per policy. The patient has been actively warmed in preoperative area. Preoperative antibiotics have been ordered and given within 1 hours of incision. Venous thrombosis prophylaxis have been ordered including bilateral sequential compression devices and chemical prophylaxis    Procedure Details: Patient was brought to the OR laid supine.  Preoperative huddle was performed and all team members participated.  Patient was then placed under general anesthesia.  Abdomen was prepped and draped in standard surgical fashion.  Timeout procedures were observed and elected to proceed at this time    Local anesthetic was instilled just below the umbilicus subumbilical incision made with a 11 blade dissection was carried down sharply and with cautery abdomen was entered in a safe fashion.  12 mm balloon port was placed intra-abdominal inflated and secured.  Pneumoperitoneum was initiated.  Patient was placed in a head upright set up position.  5 mm port was placed in the epigastric area and 2 other 5 mm ports were placed in the right upper quadrant all under direct vision.  Gallbladder had some omental adhesions to the body it was grasped elevated adhesions were gently retracted and divided using cautery.  The neck of the gallbladder was clamped with a Jackson clamp and cholangiograms obtained which showed a normal length cystic duct normal anatomy and good filling of the duodenum with no filling defects.  Jackson clamp was removed.  The neck of the gallbladder was grasped retracted laterally dissection stayed close to the wall of the gallbladder.  Cystic artery was clipped x 3 and divided a small cystic artery branch that was going to the gallbladder was cauterized clipped and divided also.  Both  arteries were small caliber and going directly to the gallbladder.  Critical view of safety had been obtained.  Cystic duct was clipped x 3 and divided gallbladder was then removed off the liver bed using cautery and placed in Endo Catch bag and extracted to the umbilical port umbilical port was established and apparent was established.  Right upper quadrant sterilely irrigated with warm sterile saline.  As the patient had been on dual antiplatelet therapy aerosolized hemostatic powder was placed at the liver bed under direct visualization.  Patient was then leveled out excess fluid was suctioned all ports were removed under direct vision pneumoperitoneum evacuated completely.  Cautery was assured at all port sites.  Umbilical port site fascia was closed with a 0 Vicryl in a running fashion.  Skin was closed with 4 Monocryl and skin adhesive.  Patient tolerated procedure well discussed with wife in detail  Complications:  None; patient tolerated the procedure well.    Disposition: PACU - hemodynamically stable.  Condition: stable         Additional Details:     Attending Attestation:     Solis Cade V  Phone Number: [phone]

## 2024-07-01 NOTE — DISCHARGE INSTRUCTIONS
General Anesthesia Discharge Instructions    About this topic  You may need general anesthesia if you need to be asleep during a procedure. Your doctor will use drugs to block the signals that go from your nerves to your brain. Doctors give general anesthesia during a surgery or procedure to:  Allow you to sleep  Help your body be still  Relax your muscles  Help you to relax and be pain free  Keep you from remembering the surgery  Let the doctor manage your airway, breathing, and blood flow  The doctor or nurse anesthetist gives general anesthesia by a shot into your vein. Sometimes, you may breathe in a gas through a mask placed over your face.  What care is needed at home?  Ask your doctor what you need to do when you go home. Make sure you ask questions if you do not understand what the doctor says.  Your doctor may give you drugs to prevent or treat an upset stomach from the anesthetic. Take them as ordered.  If your throat is sore, suck on ice chips or popsicles to ease throat pain.  Put 2 to 3 pillows under your head and back when you lie down to help you breathe easier.  For the first 24 to 48 hours:  Do not operate heavy or dangerous machinery.  Do not make major decisions or sign important papers. You may not be able to think clearly.  Avoid beer, wine, or mixed drinks.  You are at a higher risk of falling for at least 24 hours after general anesthesia.  Take extra care when you get up.  Do not change positions quickly.  Do not rush when you need to go to the bathroom or to answer the phone.  Ask for help if you feel unsteady when you try to walk.  Wear shoes with non-slip soles and low heels.  What follow-up care is needed?  Your doctor may ask you to come back to the office to check on your progress. Be sure to keep these visits.  If you have stitches that do not dissolve or staples, you will need to have them removed. Your doctor will want to do this in 1 to 2 weeks. If the doctor used skin glue, the  glue will fall off on its own.  What drugs may be needed?  The doctor may order drugs to:  Help with pain  Treat an upset stomach or throwing up  Will physical activity be limited?  You will not be allowed to drive right away after the procedure. Ask a family member or a friend to drive you home.  Avoid trying to get out of bed without help until you are sure of your balance.  You may have to limit your activity. Talk to your doctor about if you need to limit how much you lift or limit exercise after your procedure.  What changes to diet are needed?  Start with a light diet when you are fully awake. This includes things that are easy to swallow like soups, pudding, jello, toast, and eggs. Slowly progress to your normal diet.  What problems could happen?  Low blood pressure  Breathing problems  Upset stomach or throwing up  Dizziness  Blood clots  Infection  When do I need to call the doctor?  Trouble breathing  Upset stomach or throwing up more than 3 times in the next 2 days  Dizziness  Teach Back: Helping You Understand  The Teach Back Method helps you understand the information we are giving you. After you talk with the staff, tell them in your own words what you learned. This helps to make sure the staff has described each thing clearly. It also helps to explain things that may have been confusing. Before going home, make sure you can do these:  I can tell you about my procedure.  I can tell you if I need to follow up with my doctor.  I can tell you what is good for me to eat and drink the next day.  I can tell you what I would do if I have trouble breathing, an upset stomach, or dizziness.  Where can I learn more?  [employer]  https://www.nigms.nih.gov/education/pages/factsheet_Anesthesia.aspx  [facility] Choices  http://www.nhs.uk/conditions/Anaesthetic-general/Pages/Definition.aspx  Last Reviewed Date  2020-04-22      Cholecystectomy, Laparoscopic Surgery    Why is this procedure  done?  A cholecystectomy is a procedure where your gallbladder is removed. Your gallbladder is a small, pear-shaped organ that is just behind your liver. Your liver makes bile which helps break down food. The gallbladder stores bile until it is needed.  You may need to have your gallbladder taken out if:  You have small stones that block the bile from leaving the gallbladder. These are called gallstones.  You have an infection in your gallbladder.  Your gallbladder is not working normally.  You have problems with your pancreas due to stones in the bile and pancreatic ducts.  You have gallbladder cancer  What will the results be?  Relieve the pain in your belly  Treat infection  Gallstones will be removed  Less scarring and pain if your surgery is done with a laparoscope  What happens before the procedure?  Your doctor will ask you about your health history. Talk to the doctor about:  All the drugs you are taking. Be sure to include all prescription and over-the-counter (OTC) drugs, and herbal supplements. Tell the doctor about any drug allergy. Bring a list of drugs you take with you.  Any bleeding problems. Be sure to tell your doctor if you are taking any drugs that may cause bleeding. Some of these are warfarin, rivaroxaban, apixaban, ticagrelor, clopidogrel, ketorolac, ibuprofen, naproxen, or aspirin. Certain vitamins and herbs, such as garlic and fish oil, may also add to the risk for bleeding. You may need to stop these drugs as well. Talk to your doctor about them.  When you need to stop eating or drinking before your procedure.  Your doctor may order a procedure to empty out your stomach and bowel before the procedure.  Your doctor will do an exam and may order:  Lab tests  X-rays  CT scan  MRI scan  Ultrasound exam  HIDA scan  You will not be allowed to drive right away after the procedure. Ask a family member or a friend to drive you home.  What happens during the procedure?  Once you are in the operating  room, the staff will put an IV in your arm to give you fluids and drugs. You will be given a drug to make you sleepy. It will also help you stay pain free during the surgery. Your doctor may also give you a nerve block or use special numbing drugs to help with pain even after your surgery.  When you are asleep, the doctors put a tube in your mouth to help you breathe. They may also put another small tube in your nose. This one goes down to your stomach to drain out any fluids  With a laparoscopic procedure your doctor will make 3 to 4 small cuts in your belly. A scope with a tiny camera is put through one of the small cuts to look at your organs and tissues. Your doctor will put small surgical tools into the cuts to do the procedure. Your belly will be filled with gas to make it easier to see your organs.  Then, your gallbladder will be removed.    You may have special x-rays to look for more gallstones. Your doctor will close your cuts and cover them with clean bandages.  This procedure may last from 1 to 11/2 hours.  What happens after the procedure?  You will go to the Recovery Room and the staff will watch you closely. The staff will take out your breathing tube when you are awake. If you have a tube into your stomach to drain fluids, it will come out after your doctor hears gurgling sounds from your stomach. You may not eat or drink while the tube is in your nose.  You usually will go home the same day after the laparoscopic procedure. You may stay overnight if you are throwing up or can't pass your urine.  You will get drugs to help with your pain.  Some people feel pain in their shoulders after a laparoscopic surgery. This is because the gas used irritates the nerves. Use a heating pad and walk around to help get rid of the pain.  You may walk around the hospital room with help after you are fully awake.  The cut sites may be bruised and look red for a few days after the surgery. This is normal.  What drugs  may be needed?  The doctor may order drugs to:  Help with pain  Fight an infection  Soften stools if you are taking drugs for pain control  Help with upset stomach  What changes to diet are needed?  Talk to your doctor about your diet and bowel movements.  Eat foods such as jello, broth, popsicles, yogurt, and toast until you are sure that you are not feeling sick to your stomach. You may start eating your normal diet if you are not sick to your stomach. Call your doctor if you are not able to keep food and liquids down. Your doctor may order drugs to help an upset stomach.  When you are taking pain drugs, you may have hard stools. Eat more fiber and drink extra water during this time.  Later, your bowel movements may be runny after eating for a couple of months. Call your doctor if your bowel movements are preventing you from going to work or are getting in the way of your daily activities.  What problems could happen?  Bleeding  Pancreatitis  Infection  Swelling of the pancreas  Injury to small bowel or liver  Bile duct leak  Dumping syndrome, which is the need to pass stool shortly after eating a meal. This may be temporary.  Where can I learn more?  [employer]org  http://familydoctor.org/familydoctor/en/drugs-procedures-devices/procedures-devices/gallbladder-removal-laparoscopic-method.html  Last Reviewed Date  2021-05-19   PROVIDER:[TOKEN:[[id number]]]

## 2024-07-01 NOTE — ANESTHESIA POSTPROCEDURE EVALUATION
Patient: Lalo Antony    Procedure Summary       Date: 07/01/24 Room / Location: [facility] / [facility]    Anesthesia Start: 1250 Anesthesia Stop: 1454    Procedure: Cholecystectomy Laparoscopy with Cholangiogram Diagnosis:       Gallstones      (Gallstones [K80.20])    Surgeons: Solis MARKS MD Responsible Provider: Rufus Martell MD    Anesthesia Type: general ASA Status: 3            Anesthesia Type: general    Vitals Value Taken Time   /76 07/01/24 1450   Temp 36.7 07/01/24 1456   Pulse 60 07/01/24 1454   Resp 24 07/01/24 1454   SpO2 100 % 07/01/24 1454   Vitals shown include unfiled device data.    Anesthesia Post Evaluation    Patient location during evaluation: PACU  Patient participation: complete - patient participated  Level of consciousness: awake and alert  Pain score: 7  Pain management: inadequate (pt meduicated for pain)  Airway patency: patent  Cardiovascular status: hemodynamically stable  Respiratory status: nonlabored ventilation, face mask and spontaneous ventilation  Hydration status: balanced  Postoperative Nausea and Vomiting: none        There were no known notable events for this encounter.

## 2024-07-01 NOTE — NURSING NOTE
VSS, no pain, mild nausea hs improved throughout Phase II Recovery. Patient was able to drink water and eat a cookie without emesis. Upon review of discharge instructions with patient and his wife, no questions remained. Wife has picked up prescription and agrees to make follow-up appointment as requested.

## 2024-07-02 ENCOUNTER — PHARMACY VISIT (OUTPATIENT)
Dept: PHARMACY | Facility: CLINIC | Age: 79
End: 2024-07-02
Payer: COMMERCIAL

## 2024-07-02 DIAGNOSIS — F41.1 GENERALIZED ANXIETY DISORDER: ICD-10-CM

## 2024-07-02 PROCEDURE — RXMED WILLOW AMBULATORY MEDICATION CHARGE

## 2024-07-02 RX ORDER — CLONAZEPAM 0.5 MG/1
0.5 TABLET ORAL 2 TIMES DAILY
Qty: 60 TABLET | Refills: 0 | OUTPATIENT
Start: 2024-07-02 | End: 2024-08-31

## 2024-07-08 DIAGNOSIS — I10 BENIGN ESSENTIAL HYPERTENSION: ICD-10-CM

## 2024-07-08 PROCEDURE — RXMED WILLOW AMBULATORY MEDICATION CHARGE

## 2024-07-08 RX ORDER — ISOSORBIDE MONONITRATE 30 MG/1
30 TABLET, EXTENDED RELEASE ORAL DAILY
Qty: 90 TABLET | Refills: 3 | Status: SHIPPED | OUTPATIENT
Start: 2024-07-08 | End: 2025-07-08

## 2024-07-09 ENCOUNTER — PHARMACY VISIT (OUTPATIENT)
Dept: PHARMACY | Facility: CLINIC | Age: 79
End: 2024-07-09
Payer: COMMERCIAL

## 2024-07-17 ENCOUNTER — APPOINTMENT (OUTPATIENT)
Dept: RADIOLOGY | Facility: HOSPITAL | Age: 79
End: 2024-07-17
Payer: COMMERCIAL

## 2024-07-17 ENCOUNTER — APPOINTMENT (OUTPATIENT)
Dept: CARDIOLOGY | Facility: HOSPITAL | Age: 79
End: 2024-07-17
Payer: COMMERCIAL

## 2024-07-17 ENCOUNTER — HOSPITAL ENCOUNTER (OUTPATIENT)
Facility: HOSPITAL | Age: 79
Setting detail: OBSERVATION
Discharge: HOME | End: 2024-07-19
Attending: STUDENT IN AN ORGANIZED HEALTH CARE EDUCATION/TRAINING PROGRAM | Admitting: STUDENT IN AN ORGANIZED HEALTH CARE EDUCATION/TRAINING PROGRAM
Payer: COMMERCIAL

## 2024-07-17 ENCOUNTER — APPOINTMENT (OUTPATIENT)
Dept: SURGERY | Facility: CLINIC | Age: 79
End: 2024-07-17
Payer: COMMERCIAL

## 2024-07-17 DIAGNOSIS — R07.9 CHEST PAIN, UNSPECIFIED TYPE: Primary | ICD-10-CM

## 2024-07-17 DIAGNOSIS — I26.99 ACUTE PULMONARY EMBOLISM WITHOUT ACUTE COR PULMONALE, UNSPECIFIED PULMONARY EMBOLISM TYPE (MULTI): ICD-10-CM

## 2024-07-17 DIAGNOSIS — J90 PLEURAL EFFUSION: ICD-10-CM

## 2024-07-17 DIAGNOSIS — R10.84 GENERALIZED ABDOMINAL PAIN: ICD-10-CM

## 2024-07-17 DIAGNOSIS — R07.89 OTHER CHEST PAIN: ICD-10-CM

## 2024-07-17 LAB
ALBUMIN SERPL BCP-MCNC: 3.8 G/DL (ref 3.4–5)
ALP SERPL-CCNC: 61 U/L (ref 33–136)
ALT SERPL W P-5'-P-CCNC: 11 U/L (ref 10–52)
ANION GAP SERPL CALC-SCNC: 12 MMOL/L (ref 10–20)
APTT PPP: 34 SECONDS (ref 27–38)
AST SERPL W P-5'-P-CCNC: 15 U/L (ref 9–39)
BASOPHILS # BLD AUTO: 0.05 X10*3/UL (ref 0–0.1)
BASOPHILS NFR BLD AUTO: 0.9 %
BILIRUB SERPL-MCNC: 0.5 MG/DL (ref 0–1.2)
BUN SERPL-MCNC: 9 MG/DL (ref 6–23)
CALCIUM SERPL-MCNC: 9.3 MG/DL (ref 8.6–10.3)
CARDIAC TROPONIN I PNL SERPL HS: 7 NG/L (ref 0–20)
CARDIAC TROPONIN I PNL SERPL HS: 9 NG/L (ref 0–20)
CHLORIDE SERPL-SCNC: 106 MMOL/L (ref 98–107)
CO2 SERPL-SCNC: 26 MMOL/L (ref 21–32)
CREAT SERPL-MCNC: 0.79 MG/DL (ref 0.5–1.3)
D DIMER PPP FEU-MCNC: 1002 NG/ML FEU
EGFRCR SERPLBLD CKD-EPI 2021: 90 ML/MIN/1.73M*2
EOSINOPHIL # BLD AUTO: 0.06 X10*3/UL (ref 0–0.4)
EOSINOPHIL NFR BLD AUTO: 1.1 %
ERYTHROCYTE [DISTWIDTH] IN BLOOD BY AUTOMATED COUNT: 14.2 % (ref 11.5–14.5)
GLUCOSE BLD MANUAL STRIP-MCNC: 103 MG/DL (ref 74–99)
GLUCOSE SERPL-MCNC: 146 MG/DL (ref 74–99)
HCT VFR BLD AUTO: 39.6 % (ref 41–52)
HGB BLD-MCNC: 13.6 G/DL (ref 13.5–17.5)
HOLD SPECIMEN: NORMAL
HOLD SPECIMEN: NORMAL
IMM GRANULOCYTES # BLD AUTO: 0.02 X10*3/UL (ref 0–0.5)
IMM GRANULOCYTES NFR BLD AUTO: 0.4 % (ref 0–0.9)
INR PPP: 1.1 (ref 0.9–1.1)
LYMPHOCYTES # BLD AUTO: 1.11 X10*3/UL (ref 0.8–3)
LYMPHOCYTES NFR BLD AUTO: 20.7 %
MAGNESIUM SERPL-MCNC: 2.04 MG/DL (ref 1.6–2.4)
MCH RBC QN AUTO: 33.1 PG (ref 26–34)
MCHC RBC AUTO-ENTMCNC: 34.3 G/DL (ref 32–36)
MCV RBC AUTO: 96 FL (ref 80–100)
MONOCYTES # BLD AUTO: 0.52 X10*3/UL (ref 0.05–0.8)
MONOCYTES NFR BLD AUTO: 9.7 %
NEUTROPHILS # BLD AUTO: 3.6 X10*3/UL (ref 1.6–5.5)
NEUTROPHILS NFR BLD AUTO: 67.2 %
NRBC BLD-RTO: 0 /100 WBCS (ref 0–0)
PLATELET # BLD AUTO: 375 X10*3/UL (ref 150–450)
POTASSIUM SERPL-SCNC: 3.9 MMOL/L (ref 3.5–5.3)
PROT SERPL-MCNC: 6.3 G/DL (ref 6.4–8.2)
PROTHROMBIN TIME: 12.1 SECONDS (ref 9.8–12.8)
RBC # BLD AUTO: 4.11 X10*6/UL (ref 4.5–5.9)
SODIUM SERPL-SCNC: 140 MMOL/L (ref 136–145)
UFH PPP CHRO-ACNC: 0.8 IU/ML
WBC # BLD AUTO: 5.4 X10*3/UL (ref 4.4–11.3)

## 2024-07-17 PROCEDURE — 96365 THER/PROPH/DIAG IV INF INIT: CPT | Mod: 59

## 2024-07-17 PROCEDURE — 71045 X-RAY EXAM CHEST 1 VIEW: CPT | Performed by: RADIOLOGY

## 2024-07-17 PROCEDURE — 2500000001 HC RX 250 WO HCPCS SELF ADMINISTERED DRUGS (ALT 637 FOR MEDICARE OP): Performed by: STUDENT IN AN ORGANIZED HEALTH CARE EDUCATION/TRAINING PROGRAM

## 2024-07-17 PROCEDURE — 99291 CRITICAL CARE FIRST HOUR: CPT | Mod: 25 | Performed by: STUDENT IN AN ORGANIZED HEALTH CARE EDUCATION/TRAINING PROGRAM

## 2024-07-17 PROCEDURE — 93971 EXTREMITY STUDY: CPT | Performed by: RADIOLOGY

## 2024-07-17 PROCEDURE — G0378 HOSPITAL OBSERVATION PER HR: HCPCS

## 2024-07-17 PROCEDURE — 36415 COLL VENOUS BLD VENIPUNCTURE: CPT | Performed by: STUDENT IN AN ORGANIZED HEALTH CARE EDUCATION/TRAINING PROGRAM

## 2024-07-17 PROCEDURE — 84484 ASSAY OF TROPONIN QUANT: CPT | Performed by: STUDENT IN AN ORGANIZED HEALTH CARE EDUCATION/TRAINING PROGRAM

## 2024-07-17 PROCEDURE — 83735 ASSAY OF MAGNESIUM: CPT | Performed by: STUDENT IN AN ORGANIZED HEALTH CARE EDUCATION/TRAINING PROGRAM

## 2024-07-17 PROCEDURE — 2500000002 HC RX 250 W HCPCS SELF ADMINISTERED DRUGS (ALT 637 FOR MEDICARE OP, ALT 636 FOR OP/ED): Performed by: STUDENT IN AN ORGANIZED HEALTH CARE EDUCATION/TRAINING PROGRAM

## 2024-07-17 PROCEDURE — 71045 X-RAY EXAM CHEST 1 VIEW: CPT

## 2024-07-17 PROCEDURE — 86146 BETA-2 GLYCOPROTEIN ANTIBODY: CPT | Mod: ELYLAB | Performed by: STUDENT IN AN ORGANIZED HEALTH CARE EDUCATION/TRAINING PROGRAM

## 2024-07-17 PROCEDURE — 99223 1ST HOSP IP/OBS HIGH 75: CPT | Performed by: STUDENT IN AN ORGANIZED HEALTH CARE EDUCATION/TRAINING PROGRAM

## 2024-07-17 PROCEDURE — 85379 FIBRIN DEGRADATION QUANT: CPT | Performed by: STUDENT IN AN ORGANIZED HEALTH CARE EDUCATION/TRAINING PROGRAM

## 2024-07-17 PROCEDURE — 71275 CT ANGIOGRAPHY CHEST: CPT | Performed by: RADIOLOGY

## 2024-07-17 PROCEDURE — 85025 COMPLETE CBC W/AUTO DIFF WBC: CPT | Performed by: STUDENT IN AN ORGANIZED HEALTH CARE EDUCATION/TRAINING PROGRAM

## 2024-07-17 PROCEDURE — 93005 ELECTROCARDIOGRAM TRACING: CPT

## 2024-07-17 PROCEDURE — 85610 PROTHROMBIN TIME: CPT | Performed by: STUDENT IN AN ORGANIZED HEALTH CARE EDUCATION/TRAINING PROGRAM

## 2024-07-17 PROCEDURE — 82947 ASSAY GLUCOSE BLOOD QUANT: CPT | Mod: 59

## 2024-07-17 PROCEDURE — 93970 EXTREMITY STUDY: CPT

## 2024-07-17 PROCEDURE — 2550000001 HC RX 255 CONTRASTS: Performed by: STUDENT IN AN ORGANIZED HEALTH CARE EDUCATION/TRAINING PROGRAM

## 2024-07-17 PROCEDURE — 85520 HEPARIN ASSAY: CPT | Performed by: STUDENT IN AN ORGANIZED HEALTH CARE EDUCATION/TRAINING PROGRAM

## 2024-07-17 PROCEDURE — 80053 COMPREHEN METABOLIC PANEL: CPT | Performed by: STUDENT IN AN ORGANIZED HEALTH CARE EDUCATION/TRAINING PROGRAM

## 2024-07-17 PROCEDURE — 86147 CARDIOLIPIN ANTIBODY EA IG: CPT | Mod: ELYLAB | Performed by: STUDENT IN AN ORGANIZED HEALTH CARE EDUCATION/TRAINING PROGRAM

## 2024-07-17 PROCEDURE — 85730 THROMBOPLASTIN TIME PARTIAL: CPT | Performed by: STUDENT IN AN ORGANIZED HEALTH CARE EDUCATION/TRAINING PROGRAM

## 2024-07-17 PROCEDURE — 2500000004 HC RX 250 GENERAL PHARMACY W/ HCPCS (ALT 636 FOR OP/ED): Performed by: STUDENT IN AN ORGANIZED HEALTH CARE EDUCATION/TRAINING PROGRAM

## 2024-07-17 PROCEDURE — 71275 CT ANGIOGRAPHY CHEST: CPT

## 2024-07-17 RX ORDER — CLOPIDOGREL BISULFATE 75 MG/1
75 TABLET ORAL
Status: DISCONTINUED | OUTPATIENT
Start: 2024-07-17 | End: 2024-07-19 | Stop reason: HOSPADM

## 2024-07-17 RX ORDER — ISOSORBIDE MONONITRATE 30 MG/1
30 TABLET, EXTENDED RELEASE ORAL DAILY
Status: DISCONTINUED | OUTPATIENT
Start: 2024-07-17 | End: 2024-07-18

## 2024-07-17 RX ORDER — ACETAMINOPHEN 325 MG/1
650 TABLET ORAL EVERY 6 HOURS PRN
Status: DISCONTINUED | OUTPATIENT
Start: 2024-07-17 | End: 2024-07-19 | Stop reason: HOSPADM

## 2024-07-17 RX ORDER — EZETIMIBE 10 MG/1
10 TABLET ORAL NIGHTLY
Status: DISCONTINUED | OUTPATIENT
Start: 2024-07-17 | End: 2024-07-19 | Stop reason: HOSPADM

## 2024-07-17 RX ORDER — SIMVASTATIN 40 MG/1
40 TABLET, FILM COATED ORAL NIGHTLY
Status: DISCONTINUED | OUTPATIENT
Start: 2024-07-17 | End: 2024-07-19 | Stop reason: HOSPADM

## 2024-07-17 RX ORDER — NAPROXEN SODIUM 220 MG/1
81 TABLET, FILM COATED ORAL DAILY
Status: DISCONTINUED | OUTPATIENT
Start: 2024-07-17 | End: 2024-07-18

## 2024-07-17 RX ORDER — DEXTROSE 50 % IN WATER (D50W) INTRAVENOUS SYRINGE
12.5
Status: DISCONTINUED | OUTPATIENT
Start: 2024-07-17 | End: 2024-07-19 | Stop reason: HOSPADM

## 2024-07-17 RX ORDER — HEPARIN SODIUM 10000 [USP'U]/100ML
0-4500 INJECTION, SOLUTION INTRAVENOUS CONTINUOUS
Status: DISCONTINUED | OUTPATIENT
Start: 2024-07-17 | End: 2024-07-18

## 2024-07-17 RX ORDER — LOSARTAN POTASSIUM 25 MG/1
50 TABLET ORAL DAILY
Status: DISCONTINUED | OUTPATIENT
Start: 2024-07-17 | End: 2024-07-19 | Stop reason: HOSPADM

## 2024-07-17 RX ORDER — PHENYTOIN SODIUM 100 MG/1
100 CAPSULE, EXTENDED RELEASE ORAL 3 TIMES DAILY
Status: DISCONTINUED | OUTPATIENT
Start: 2024-07-17 | End: 2024-07-19 | Stop reason: HOSPADM

## 2024-07-17 RX ORDER — FUROSEMIDE 40 MG/1
40 TABLET ORAL DAILY
Status: DISCONTINUED | OUTPATIENT
Start: 2024-07-17 | End: 2024-07-19 | Stop reason: HOSPADM

## 2024-07-17 RX ORDER — DEXTROSE 50 % IN WATER (D50W) INTRAVENOUS SYRINGE
25
Status: DISCONTINUED | OUTPATIENT
Start: 2024-07-17 | End: 2024-07-19 | Stop reason: HOSPADM

## 2024-07-17 RX ORDER — LANOLIN ALCOHOL/MO/W.PET/CERES
400 CREAM (GRAM) TOPICAL DAILY
Status: DISCONTINUED | OUTPATIENT
Start: 2024-07-17 | End: 2024-07-19 | Stop reason: HOSPADM

## 2024-07-17 RX ORDER — CLONAZEPAM 0.5 MG/1
0.5 TABLET ORAL 2 TIMES DAILY
Status: DISCONTINUED | OUTPATIENT
Start: 2024-07-17 | End: 2024-07-19 | Stop reason: HOSPADM

## 2024-07-17 SDOH — SOCIAL STABILITY: SOCIAL INSECURITY: ARE THERE ANY APPARENT SIGNS OF INJURIES/BEHAVIORS THAT COULD BE RELATED TO ABUSE/NEGLECT?: NO

## 2024-07-17 SDOH — SOCIAL STABILITY: SOCIAL INSECURITY: DOES ANYONE TRY TO KEEP YOU FROM HAVING/CONTACTING OTHER FRIENDS OR DOING THINGS OUTSIDE YOUR HOME?: NO

## 2024-07-17 SDOH — SOCIAL STABILITY: SOCIAL INSECURITY: DO YOU FEEL UNSAFE GOING BACK TO THE PLACE WHERE YOU ARE LIVING?: NO

## 2024-07-17 SDOH — SOCIAL STABILITY: SOCIAL INSECURITY: HAS ANYONE EVER THREATENED TO HURT YOUR FAMILY OR YOUR PETS?: NO

## 2024-07-17 SDOH — SOCIAL STABILITY: SOCIAL INSECURITY: HAVE YOU HAD THOUGHTS OF HARMING ANYONE ELSE?: NO

## 2024-07-17 SDOH — ECONOMIC STABILITY: TRANSPORTATION INSECURITY
IN THE PAST 12 MONTHS, HAS THE LACK OF TRANSPORTATION KEPT YOU FROM MEDICAL APPOINTMENTS OR FROM GETTING MEDICATIONS?: NO

## 2024-07-17 SDOH — SOCIAL STABILITY: SOCIAL INSECURITY: DO YOU FEEL ANYONE HAS EXPLOITED OR TAKEN ADVANTAGE OF YOU FINANCIALLY OR OF YOUR PERSONAL PROPERTY?: NO

## 2024-07-17 SDOH — ECONOMIC STABILITY: TRANSPORTATION INSECURITY
IN THE PAST 12 MONTHS, HAS LACK OF TRANSPORTATION KEPT YOU FROM MEETINGS, WORK, OR FROM GETTING THINGS NEEDED FOR DAILY LIVING?: NO

## 2024-07-17 SDOH — ECONOMIC STABILITY: INCOME INSECURITY: IN THE LAST 12 MONTHS, WAS THERE A TIME WHEN YOU WERE NOT ABLE TO PAY THE MORTGAGE OR RENT ON TIME?: NO

## 2024-07-17 SDOH — ECONOMIC STABILITY: INCOME INSECURITY: HOW HARD IS IT FOR YOU TO PAY FOR THE VERY BASICS LIKE FOOD, HOUSING, MEDICAL CARE, AND HEATING?: NOT HARD AT ALL

## 2024-07-17 SDOH — ECONOMIC STABILITY: HOUSING INSECURITY: AT ANY TIME IN THE PAST 12 MONTHS, WERE YOU HOMELESS OR LIVING IN A SHELTER (INCLUDING NOW)?: NO

## 2024-07-17 SDOH — SOCIAL STABILITY: SOCIAL INSECURITY: HAVE YOU HAD ANY THOUGHTS OF HARMING ANYONE ELSE?: NO

## 2024-07-17 SDOH — SOCIAL STABILITY: SOCIAL INSECURITY: ARE YOU OR HAVE YOU BEEN THREATENED OR ABUSED PHYSICALLY, EMOTIONALLY, OR SEXUALLY BY ANYONE?: NO

## 2024-07-17 SDOH — ECONOMIC STABILITY: HOUSING INSECURITY: IN THE PAST 12 MONTHS, HOW MANY TIMES HAVE YOU MOVED WHERE YOU WERE LIVING?: 1

## 2024-07-17 SDOH — SOCIAL STABILITY: SOCIAL INSECURITY: WERE YOU ABLE TO COMPLETE ALL THE BEHAVIORAL HEALTH SCREENINGS?: YES

## 2024-07-17 SDOH — SOCIAL STABILITY: SOCIAL INSECURITY: ABUSE: ADULT

## 2024-07-17 ASSESSMENT — COGNITIVE AND FUNCTIONAL STATUS - GENERAL
MOBILITY SCORE: 20
STANDING UP FROM CHAIR USING ARMS: A LITTLE
WALKING IN HOSPITAL ROOM: A LITTLE
MOVING TO AND FROM BED TO CHAIR: A LITTLE
WALKING IN HOSPITAL ROOM: A LITTLE
MOBILITY SCORE: 20
CLIMB 3 TO 5 STEPS WITH RAILING: A LITTLE
STANDING UP FROM CHAIR USING ARMS: A LITTLE
CLIMB 3 TO 5 STEPS WITH RAILING: A LITTLE
DAILY ACTIVITIY SCORE: 24
DRESSING REGULAR LOWER BODY CLOTHING: A LITTLE
PATIENT BASELINE BEDBOUND: NO
DAILY ACTIVITIY SCORE: 23
MOVING TO AND FROM BED TO CHAIR: A LITTLE

## 2024-07-17 ASSESSMENT — ENCOUNTER SYMPTOMS
VOMITING: 0
DIARRHEA: 0
HEADACHES: 0
NAUSEA: 0
ACTIVITY CHANGE: 1
LIGHT-HEADEDNESS: 1
APPETITE CHANGE: 0
ABDOMINAL PAIN: 1

## 2024-07-17 ASSESSMENT — HEART SCORE
ECG: NORMAL
HEART SCORE: 4
HISTORY: SLIGHTLY SUSPICIOUS
TROPONIN: LESS THAN OR EQUAL TO NORMAL LIMIT
AGE: 65+
RISK FACTORS: >2 RISK FACTORS OR HX OF ATHEROSCLEROTIC DISEASE

## 2024-07-17 ASSESSMENT — ACTIVITIES OF DAILY LIVING (ADL)
PATIENT'S MEMORY ADEQUATE TO SAFELY COMPLETE DAILY ACTIVITIES?: YES
ASSISTIVE_DEVICE: EYEGLASSES
HEARING - LEFT EAR: FUNCTIONAL
HEARING - RIGHT EAR: FUNCTIONAL
DRESSING YOURSELF: INDEPENDENT
ADEQUATE_TO_COMPLETE_ADL: YES
GROOMING: INDEPENDENT
FEEDING YOURSELF: INDEPENDENT
TOILETING: INDEPENDENT
WALKS IN HOME: INDEPENDENT
JUDGMENT_ADEQUATE_SAFELY_COMPLETE_DAILY_ACTIVITIES: YES
BATHING: INDEPENDENT

## 2024-07-17 ASSESSMENT — LIFESTYLE VARIABLES
AUDIT-C TOTAL SCORE: 0
TOTAL SCORE: 0
AUDIT-C TOTAL SCORE: 0
EVER FELT BAD OR GUILTY ABOUT YOUR DRINKING: NO
SKIP TO QUESTIONS 9-10: 1
HAVE PEOPLE ANNOYED YOU BY CRITICIZING YOUR DRINKING: NO
HAVE YOU EVER FELT YOU SHOULD CUT DOWN ON YOUR DRINKING: NO
HOW OFTEN DO YOU HAVE 6 OR MORE DRINKS ON ONE OCCASION: NEVER
EVER HAD A DRINK FIRST THING IN THE MORNING TO STEADY YOUR NERVES TO GET RID OF A HANGOVER: NO
HOW OFTEN DO YOU HAVE A DRINK CONTAINING ALCOHOL: NEVER
HOW MANY STANDARD DRINKS CONTAINING ALCOHOL DO YOU HAVE ON A TYPICAL DAY: PATIENT DOES NOT DRINK

## 2024-07-17 ASSESSMENT — PATIENT HEALTH QUESTIONNAIRE - PHQ9
SUM OF ALL RESPONSES TO PHQ9 QUESTIONS 1 & 2: 0
2. FEELING DOWN, DEPRESSED OR HOPELESS: NOT AT ALL
1. LITTLE INTEREST OR PLEASURE IN DOING THINGS: NOT AT ALL

## 2024-07-17 ASSESSMENT — PAIN DESCRIPTION - LOCATION: LOCATION: HEAD

## 2024-07-17 ASSESSMENT — PAIN SCALES - GENERAL
PAINLEVEL_OUTOF10: 4
PAINLEVEL_OUTOF10: 3

## 2024-07-17 ASSESSMENT — PAIN - FUNCTIONAL ASSESSMENT: PAIN_FUNCTIONAL_ASSESSMENT: 0-10

## 2024-07-17 ASSESSMENT — PAIN DESCRIPTION - PROGRESSION: CLINICAL_PROGRESSION: NOT CHANGED

## 2024-07-17 NOTE — ED PROVIDER NOTES
HPI   No chief complaint on file.      78-year-old male with a history of CAD with multiple prior cardiac stents, hypertension, hyperlipidemia, diabetes presenting with chest pain.  Patient reports he has had dull pain for the last few days but today it significantly worsened.  Recently had gallbladder removed 2 weeks ago.      History provided by:  Patient          Patient History   Past Medical History:   Diagnosis Date    Basal cell carcinoma     SKIN    BPH (benign prostatic hyperplasia)     Cholelithiasis     Coronary artery disease     COVID-19     VACCINATED    Diarrhea     Dizziness     Epilepsy (Multi)     Generalized anxiety disorder     GERD (gastroesophageal reflux disease)     Hyperlipidemia     Hypertension     PONV (postoperative nausea and vomiting)     VIOLENT AND AGGRESSIVE X 1 EPISODE    Type 2 diabetes mellitus (Multi)     REMISSION    Wears dentures     Wears glasses      Past Surgical History:   Procedure Laterality Date    OTHER SURGICAL HISTORY  04/01/2019    Inguinal hernia repair    OTHER SURGICAL HISTORY  10/27/2021    Percutaneous transluminal coronary angioplasty    OTHER SURGICAL HISTORY  10/27/2021    Skin lesion excision    OTHER SURGICAL HISTORY  07/14/2020    Excision of basal cell carcinoma    OTHER SURGICAL HISTORY  06/04/2020    Tooth extraction    OTHER SURGICAL HISTORY  06/04/2020    Colonoscopy complete for polypectomy    OTHER SURGICAL HISTORY  06/04/2020    Stomach biopsy    OTHER SURGICAL HISTORY  06/04/2020    Esophagogastroduodenoscopy    OTHER SURGICAL HISTORY  06/16/2020    Cardiac catheterization with stent placement     Family History   Problem Relation Name Age of Onset    Lung cancer Mother      Cancer Mother      Coronary artery disease Father      Heart attack Father      Skin cancer Son       Social History     Tobacco Use    Smoking status: Every Day     Current packs/day: 0.00     Types: Cigarettes     Last attempt to quit: 1/1/1990     Years since quitting:  34.5    Smokeless tobacco: Never   Vaping Use    Vaping status: Never Used   Substance Use Topics    Alcohol use: Not Currently    Drug use: Never       Physical Exam   ED Triage Vitals   Temp Heart Rate Respirations BP   -- 07/17/24 1300 07/17/24 1300 07/17/24 1303    70 14 146/77      Pulse Ox Temp src Heart Rate Source Patient Position   07/17/24 1300 -- -- --   98 %         BP Location FiO2 (%)     -- --             Physical Exam  Vitals and nursing note reviewed.   Constitutional:       General: He is not in acute distress.  HENT:      Head: Atraumatic.      Mouth/Throat:      Mouth: Mucous membranes are moist.      Pharynx: Oropharynx is clear.   Eyes:      Extraocular Movements: Extraocular movements intact.      Conjunctiva/sclera: Conjunctivae normal.      Pupils: Pupils are equal, round, and reactive to light.   Cardiovascular:      Rate and Rhythm: Normal rate and regular rhythm.      Pulses: Normal pulses.   Pulmonary:      Effort: Pulmonary effort is normal. No respiratory distress.      Breath sounds: Normal breath sounds.   Abdominal:      General: There is no distension.      Palpations: Abdomen is soft.      Tenderness: There is no abdominal tenderness. There is no guarding or rebound.   Musculoskeletal:         General: No deformity.      Cervical back: Neck supple.   Skin:     General: Skin is warm and dry.   Neurological:      Mental Status: He is alert and oriented to person, place, and time. Mental status is at baseline.      Cranial Nerves: No cranial nerve deficit.      Sensory: No sensory deficit.      Motor: No weakness.   Psychiatric:         Mood and Affect: Mood normal.         Behavior: Behavior normal.           ED Course & MDM   Diagnoses as of 07/17/24 1601   Chest pain, unspecified type   Pleural effusion   Acute pulmonary embolism without acute cor pulmonale, unspecified pulmonary embolism type (Multi)                       No data recorded HEART Score: 4                  Labs  Reviewed   CBC WITH AUTO DIFFERENTIAL - Abnormal       Result Value    WBC 5.4      nRBC 0.0      RBC 4.11 (*)     Hemoglobin 13.6      Hematocrit 39.6 (*)     MCV 96      MCH 33.1      MCHC 34.3      RDW 14.2      Platelets 375      Neutrophils % 67.2      Immature Granulocytes %, Automated 0.4      Lymphocytes % 20.7      Monocytes % 9.7      Eosinophils % 1.1      Basophils % 0.9      Neutrophils Absolute 3.60      Immature Granulocytes Absolute, Automated 0.02      Lymphocytes Absolute 1.11      Monocytes Absolute 0.52      Eosinophils Absolute 0.06      Basophils Absolute 0.05     COMPREHENSIVE METABOLIC PANEL - Abnormal    Glucose 146 (*)     Sodium 140      Potassium 3.9      Chloride 106      Bicarbonate 26      Anion Gap 12      Urea Nitrogen 9      Creatinine 0.79      eGFR 90      Calcium 9.3      Albumin 3.8      Alkaline Phosphatase 61      Total Protein 6.3 (*)     AST 15      Bilirubin, Total 0.5      ALT 11     D-DIMER, VTE EXCLUSION - Abnormal    D-Dimer, Quantitative VTE Exclusion 1,002 (*)     Narrative:     The VTE Exclusion D-Dimer assay is reported in ng/mL Fibrinogen Equivalent Units (FEU).    Per 's instructions for use, a value of less than 500 ng/mL (FEU) may help to exclude DVT or PE in outpatients when the assay is used with a clinical pretest probability assessment.([facility] must utilize and document eCalc 'Wells Score Deep Vein Thrombosis Risk' for DVT exclusion only. Emergency Department should utilize  Guidelines for Emergency Department Use of the VTE Exclusion D-Dimer and Clinical Pretest probability assessment model for DVT or PE exclusion.)   MAGNESIUM - Normal    Magnesium 2.04     SERIAL TROPONIN-INITIAL - Normal    Troponin I, High Sensitivity 9      Narrative:     Less than 99th percentile of normal range cutoff-  Female and children under 18 years old <14 ng/L; Male <21 ng/L: Negative  Repeat testing should be performed if clinically indicated.     Female and  children under 18 years old 14-50 ng/L; Male 21-50 ng/L:  Consistent with possible cardiac damage and possible increased clinical   risk. Serial measurements may help to assess extent of myocardial damage.     >50 ng/L: Consistent with cardiac damage, increased clinical risk and  myocardial infarction. Serial measurements may help assess extent of   myocardial damage.      NOTE: Children less than 1 year old may have higher baseline troponin   levels and results should be interpreted in conjunction with the overall   clinical context.     NOTE: Troponin I testing is performed using a different   testing methodology at [facility] than at other   [facility]. Direct result comparisons should only   be made within the same method.   PROTIME-INR - Normal    Protime 12.1      INR 1.1     APTT - Normal    aPTT 34      Narrative:     The APTT is no longer used for monitoring Unfractionated Heparin Therapy. For monitoring Heparin Therapy, use the Heparin Assay.   TROPONIN SERIES- (INITIAL, 1 HR)    Narrative:     The following orders were created for panel order Troponin I Series, High Sensitivity (0, 1 HR).  Procedure                               Abnormality         Status                     ---------                               -----------         ------                     Troponin I, High Sensiti...[[id number]]  Normal              Final result               Troponin, High Sensitivi...[[id number]]                                                   Please view results for these tests on the individual orders.   SERIAL TROPONIN, 1 HOUR     CT angio chest for pulmonary embolism   Final Result   1. Small volume nonocclusive pulmonary embolus within the right lower   lobe.   2. Small right pleural effusion.        MACRO:   None        Signed by: Satya Sanches 7/17/2024 3:27 PM   Dictation workstation:   [id number]      XR chest 1 view   Final Result   New small bilateral pleural effusions right slightly  greater than   left. No other acute findings.        Signed by: Roe Graves 7/17/2024 2:07 PM   Dictation workstation:   [id number]            Medical Decision Making  78-year-old male with extensive cardiac history presenting with acute chest pain.  Patient awake and alert on evaluation, hemodynamically stable.  Lungs are clear.  Equal pulses.  No peripheral edema.  Workup performed in the ED to rule out ACS.  Initial ECG is nonischemic.    Patient is chest x-ray demonstrating small bilateral pleural effusions.  CBC without leukocytosis, stable H&H.  Metabolic panel with normal renal function, no significant electrolyte derangements.  Initial troponin level negative.  D-dimer returned elevated, CT PE study ordered given patient's recent surgery.     CT PE study demonstrating small PE in the right lower lobe, no right heart strain.  Patient started on heparin drip for anticoagulation.  Plan to admit for continued chest pain rule out given patient's significant cardiac history as well as treatment of acute PE.  Case discussed with hospitalist for admission.    Amount and/or Complexity of Data Reviewed  ECG/medicine tests: ordered and independent interpretation performed. Decision-making details documented in ED Course.     Details: Twelve-lead ECG obtained at 1238 by my interpretation demonstrates sinus rhythm with first-degree AV block, rate of 76, no acute ST elevation or depression.    Repeat twelve-lead ECG obtained at 1354 by my interpretation demonstrates sinus rhythm with first-degree AV block, rate of 73, no acute ST elevation or depression.        Procedure  Critical Care    Performed by: Jim Goodson MD  Authorized by: Jim Goodson MD    Critical care provider statement:     Critical care time (minutes):  32    Critical care time was exclusive of:  Separately billable procedures and treating other patients    Critical care was necessary to treat or prevent imminent or life-threatening  deterioration of the following conditions: acute PE.    Critical care was time spent personally by me on the following activities:  Ordering and performing treatments and interventions, ordering and review of laboratory studies, ordering and review of radiographic studies, pulse oximetry, re-evaluation of patient's condition, examination of patient and obtaining history from patient or surrogate    Care discussed with: admitting provider         Jim Goodson MD  07/17/24 2130

## 2024-07-17 NOTE — H&P
History and Physical Note  Patient: Lalo Antony   Age: 78 y.o. Gender: male     History of Present Illness:   Admission Reason: No chief complaint on file.    HPI:   Lalo Antony is a 78 y.o. year old male with PMH CAD sp PCI, DM, HTN, seizures, anxiety presented to the ED with CP. He said he developed sharp, dull, pressured like pain in mid anterior chest. Denies radiation. He has some chills. Denies fever, N/V/D. As of note, he just had lap cholecystectomy 2 weeks PTA. In the ED, he is found to have PE, hep gtt is started. He denies hx of clots or FH of clots.    Review of Systems:  Review of Systems   Constitutional:  Positive for activity change. Negative for appetite change.   HENT:  Negative for congestion.    Cardiovascular:  Positive for chest pain.   Gastrointestinal:  Positive for abdominal pain. Negative for diarrhea, nausea and vomiting.   Neurological:  Positive for light-headedness. Negative for headaches.   All other systems reviewed and are negative.      Allergies:   Allergies   Allergen Reactions    Beta-Blockers (Beta-Adrenergic Blocking Agts) Unknown     DOES NOT REMEMBER    Rosuvastatin Rash and Swelling     severe muscle pain       Past Medical History:  Past Medical History:   Diagnosis Date    Basal cell carcinoma     SKIN    BPH (benign prostatic hyperplasia)     Cholelithiasis     Coronary artery disease     COVID-19     VACCINATED    Diarrhea     Dizziness     Epilepsy (Multi)     Generalized anxiety disorder     GERD (gastroesophageal reflux disease)     Hyperlipidemia     Hypertension     PONV (postoperative nausea and vomiting)     VIOLENT AND AGGRESSIVE X 1 EPISODE    Type 2 diabetes mellitus (Multi)     REMISSION    Wears dentures     Wears glasses        Past Surgical History:   Past Surgical History:   Procedure Laterality Date    OTHER SURGICAL HISTORY  04/01/2019    Inguinal hernia repair    OTHER SURGICAL HISTORY  10/27/2021    Percutaneous transluminal coronary  angioplasty    OTHER SURGICAL HISTORY  10/27/2021    Skin lesion excision    OTHER SURGICAL HISTORY  2020    Excision of basal cell carcinoma    OTHER SURGICAL HISTORY  2020    Tooth extraction    OTHER SURGICAL HISTORY  2020    Colonoscopy complete for polypectomy    OTHER SURGICAL HISTORY  2020    Stomach biopsy    OTHER SURGICAL HISTORY  2020    Esophagogastroduodenoscopy    OTHER SURGICAL HISTORY  2020    Cardiac catheterization with stent placement       Family History:  Family History   Problem Relation Name Age of Onset    Lung cancer Mother      Cancer Mother      Coronary artery disease Father      Heart attack Father      Skin cancer Son         Social History:  Social History     Tobacco Use   Smoking Status Every Day    Current packs/day: 0.00    Types: Cigarettes    Last attempt to quit: 1990    Years since quittin.5   Smokeless Tobacco Never       Social History     Substance and Sexual Activity   Alcohol Use Not Currently       Social History     Substance and Sexual Activity   Drug Use Never       Home Medications:  Current Outpatient Medications   Medication Instructions    aspirin 81 mg, oral, Daily    clonazePAM (KLONOPIN) 0.5 mg, oral, 2 times daily    clopidogrel (PLAVIX) 75 mg, oral, Daily RT    dicyclomine (BENTYL) 20 mg, oral, 4 times daily PRN    Dilantin Extended 100 mg, oral, 3 times daily    ezetimibe (ZETIA) 10 mg, oral, Daily    furosemide (Lasix) 40 mg tablet TAKE 1 TABLET BY MOUTH ONCE DAILY    HYDROcodone-acetaminophen (Norco) 5-325 mg tablet 1 tablet, oral, Every 6 hours PRN    isosorbide mononitrate ER (IMDUR) 30 mg, oral, Daily    losartan (COZAAR) 50 mg, oral, Daily    magnesium oxide (Mag-Ox) 400 mg (241.3 mg magnesium) tablet 1 tablet, oral, Daily    nitroglycerin (Nitrostat) 0.4 mg SL tablet DISSOLVE 1 TABLET UNDER TONGUE EVERY 5 MINUTES FOR UP TO 3 DOSES AS NEEDED FOR CHEST PAIN. IF PAIN PERSISTS DIAL 911 OR GO TO ER.     simvastatin (Zocor) 40 mg tablet TAKE 1 TABLET BY MOUTH EVERY NIGHT AT BEDTIME       Vitals:  Visit Vitals  /77   Pulse 68   Resp 18   Wt 75.7 kg (166 lb 14.2 oz)   SpO2 99%   BMI 26.14 kg/m²   Smoking Status Every Day   BSA 1.89 m²       Physical Exam  Vitals and nursing note reviewed.   Constitutional:       General: He is not in acute distress.     Appearance: Normal appearance. He is not ill-appearing or toxic-appearing.   HENT:      Head: Normocephalic and atraumatic.      Nose: Nose normal.      Mouth/Throat:      Mouth: Mucous membranes are moist.   Eyes:      Extraocular Movements: Extraocular movements intact.      Conjunctiva/sclera: Conjunctivae normal.      Pupils: Pupils are equal, round, and reactive to light.   Cardiovascular:      Rate and Rhythm: Normal rate and regular rhythm.      Heart sounds: No murmur heard.     No gallop.   Pulmonary:      Effort: Pulmonary effort is normal. No respiratory distress.      Breath sounds: Normal breath sounds. No wheezing, rhonchi or rales.   Abdominal:      General: Abdomen is flat. Bowel sounds are normal. There is no distension.      Palpations: Abdomen is soft. There is no mass.      Tenderness: There is no abdominal tenderness.      Comments: Surgical site: c/d/i   Musculoskeletal:         General: No swelling or tenderness. Normal range of motion.      Cervical back: Normal range of motion and neck supple.   Skin:     General: Skin is warm and dry.   Neurological:      General: No focal deficit present.      Mental Status: He is alert and oriented to person, place, and time. Mental status is at baseline.   Psychiatric:         Mood and Affect: Mood normal.         Behavior: Behavior normal.         Thought Content: Thought content normal.         Judgment: Judgment normal.         Labs and Imaging Results:  Lab Results   Component Value Date    WBC 5.4 07/17/2024       CT angio chest for pulmonary embolism  Narrative: Interpreted By:  Satya Sanches,    STUDY:  CT ANGIO CHEST FOR PULMONARY EMBOLISM;  7/17/2024 3:09 pm      INDICATION:  Signs/Symptoms:Pleuritic chest pain, recent surgery, elevated D-dimer.      COMPARISON:  None.      ACCESSION NUMBER(S):  [id number]      ORDERING CLINICIAN:  YEIMY ALBRECHT      TECHNIQUE:  Helical data acquisition of the chest was obtained with 75 mL  Omnipaque 350. Images were reformatted in coronal and sagittal  planes. Axial and coronal MIP images were created and reviewed.      FINDINGS:  POTENTIAL LIMITATIONS OF THE STUDY: None      HEART AND VESSELS:  No discrete filling defects within the main pulmonary artery or its  branches.      Small eccentric filling defect about the right lower lobe at the  proximal segmental division. No additional filling defects identified  within the bilateral lungs.      The thoracic aorta is of normal course and caliber.      Dense coronary artery calcifications and/or stents.      The cardiac chambers are not enlarged.      No evidence of pericardial effusion.      MEDIASTINUM AND VICKI, LOWER NECK AND AXILLA:  The visualized thyroid gland is within normal limits.      No evidence of thoracic lymphadenopathy by CT criteria.      Esophagus appears within normal limits as seen.      LUNGS AND AIRWAYS:  The trachea and central airways are patent. No endobronchial lesion.      Small right pleural effusion.      UPPER ABDOMEN:  The visualized subdiaphragmatic structures demonstrate no remarkable  findings.      CHEST WALL AND OSSEOUS STRUCTURES:  There are no suspicious osseous lesions. Multilevel degenerative  changes are present      Impression: 1. Small volume nonocclusive pulmonary embolus within the right lower  lobe.  2. Small right pleural effusion.      MACRO:  None      Signed by: Satya Sanches 7/17/2024 3:27 PM  Dictation workstation:   [id number]  XR chest 1 view  Narrative: Interpreted By:  Roe Graves,   STUDY:  XR CHEST 1 VIEW      INDICATION:  Signs/Symptoms:Chest Pain.       COMPARISON:  April 13, 2024      ACCESSION NUMBER(S):  [id number]      ORDERING CLINICIAN:  YEIMY ALBRECHT      FINDINGS:  Interval development of small bilateral pleural effusions  right-greater-than-left.      No gross consolidation. Cardiomegaly unchanged.      Impression: New small bilateral pleural effusions right slightly greater than  left. No other acute findings.      Signed by: Roe Graves 7/17/2024 2:07 PM  Dictation workstation:   [id number]  ECG 12 lead  Sinus rhythm with 1st degree AV block  Anterior infarct (cited on or before 17-JUL-2024)  Abnormal ECG  When compared with ECG of 17-JUL-2024 12:38, (unconfirmed)  No significant change was found  See ED provider note for full interpretation and clinical correlation  ECG 12 lead  Sinus rhythm with 1st degree AV block  Anterior infarct , age undetermined  Abnormal ECG  When compared with ECG of 13-APR-2024 19:51,  No significant change was found  See ED provider note for full interpretation and clinical correlation      Assessment and Plan:    Principal Problem:    Chest pain      Patient Active Problem List   Diagnosis    CAD S/P percutaneous coronary angioplasty    Benign essential hypertension    Benign prostatic hyperplasia    Epilepsy (Multi)    Generalized anxiety disorder    GERD without esophagitis    Hyperlipidemia, mixed    Vitamin D deficiency    Former smoker    Type 2 diabetes mellitus with other circulatory complication, without long-term current use of insulin (Multi)    Diarrhea    Dizziness    Generalized abdominal pain    Headache    Malignant basal cell neoplasm of skin    Unexplained weight loss    Gallstones    Chest pain     CP concern for ACS  New PE on CT chest  Hx CAD sp PCI  - obs to tele  - hep gtt  - echo  - b/l venous duplex  - Cardio consult  - check anticardiolipin ab and beta 2 glycoprotein ab  - statin  - ASA  - plavix  - zetia    HTN  - losartan  - imdur    DM  - hold metformin  - ISS    CAD sp  PCI      Seizure  - dilantin    Anxiety  - klonopin    Diet: diabetic  Dispo: admit as an hourly observation patient anticipating less than forty-eight hours or two midnights stay.     Cecy Easley MD  Hospitalist

## 2024-07-18 ENCOUNTER — APPOINTMENT (OUTPATIENT)
Dept: CARDIOLOGY | Facility: HOSPITAL | Age: 79
End: 2024-07-18
Payer: COMMERCIAL

## 2024-07-18 LAB
ALBUMIN SERPL BCP-MCNC: 3.6 G/DL (ref 3.4–5)
ALP SERPL-CCNC: 57 U/L (ref 33–136)
ALT SERPL W P-5'-P-CCNC: 11 U/L (ref 10–52)
ANION GAP SERPL CALC-SCNC: 11 MMOL/L (ref 10–20)
AORTIC VALVE MEAN GRADIENT: 5 MMHG
AORTIC VALVE PEAK VELOCITY: 1.46 M/S
AST SERPL W P-5'-P-CCNC: 13 U/L (ref 9–39)
ATRIAL RATE: 69 BPM
ATRIAL RATE: 73 BPM
ATRIAL RATE: 76 BPM
AV PEAK GRADIENT: 8.5 MMHG
AVA (PEAK VEL): 3.17 CM2
AVA (VTI): 3.38 CM2
B2 GLYCOPROT1 IGA SER-ACNC: <0.6 U/ML
B2 GLYCOPROT1 IGG SER-ACNC: <1.4 U/ML
B2 GLYCOPROT1 IGM SER-ACNC: 0.7 U/ML
BASOPHILS # BLD AUTO: 0.06 X10*3/UL (ref 0–0.1)
BASOPHILS NFR BLD AUTO: 1.3 %
BILIRUB SERPL-MCNC: 0.5 MG/DL (ref 0–1.2)
BUN SERPL-MCNC: 8 MG/DL (ref 6–23)
CALCIUM SERPL-MCNC: 8.9 MG/DL (ref 8.6–10.3)
CARDIOLIPIN IGA SERPL-ACNC: <0.5 APL U/ML
CARDIOLIPIN IGG SER IA-ACNC: <1.6 GPL U/ML
CARDIOLIPIN IGM SER IA-ACNC: 1.2 MPL U/ML
CHLORIDE SERPL-SCNC: 107 MMOL/L (ref 98–107)
CO2 SERPL-SCNC: 28 MMOL/L (ref 21–32)
CREAT SERPL-MCNC: 0.67 MG/DL (ref 0.5–1.3)
EGFRCR SERPLBLD CKD-EPI 2021: >90 ML/MIN/1.73M*2
EJECTION FRACTION APICAL 4 CHAMBER: 61.5
EJECTION FRACTION: 63 %
EOSINOPHIL # BLD AUTO: 0.15 X10*3/UL (ref 0–0.4)
EOSINOPHIL NFR BLD AUTO: 3.2 %
ERYTHROCYTE [DISTWIDTH] IN BLOOD BY AUTOMATED COUNT: 14.3 % (ref 11.5–14.5)
GLUCOSE BLD MANUAL STRIP-MCNC: 101 MG/DL (ref 74–99)
GLUCOSE SERPL-MCNC: 119 MG/DL (ref 74–99)
HCT VFR BLD AUTO: 37.7 % (ref 41–52)
HGB BLD-MCNC: 12.9 G/DL (ref 13.5–17.5)
HOLD SPECIMEN: NORMAL
IMM GRANULOCYTES # BLD AUTO: 0 X10*3/UL (ref 0–0.5)
IMM GRANULOCYTES NFR BLD AUTO: 0 % (ref 0–0.9)
LEFT ATRIUM VOLUME AREA LENGTH INDEX BSA: 21.8 ML/M2
LEFT VENTRICLE INTERNAL DIMENSION DIASTOLE: 4.44 CM (ref 3.5–6)
LEFT VENTRICULAR OUTFLOW TRACT DIAMETER: 2.17 CM
LV EJECTION FRACTION BIPLANE: 60 %
LYMPHOCYTES # BLD AUTO: 1.35 X10*3/UL (ref 0.8–3)
LYMPHOCYTES NFR BLD AUTO: 28.9 %
MCH RBC QN AUTO: 32.7 PG (ref 26–34)
MCHC RBC AUTO-ENTMCNC: 34.2 G/DL (ref 32–36)
MCV RBC AUTO: 95 FL (ref 80–100)
MITRAL VALVE E/A RATIO: 0.89
MONOCYTES # BLD AUTO: 0.55 X10*3/UL (ref 0.05–0.8)
MONOCYTES NFR BLD AUTO: 11.8 %
NEUTROPHILS # BLD AUTO: 2.56 X10*3/UL (ref 1.6–5.5)
NEUTROPHILS NFR BLD AUTO: 54.8 %
NRBC BLD-RTO: 0 /100 WBCS (ref 0–0)
P AXIS: -10 DEGREES
P AXIS: -23 DEGREES
P AXIS: 10 DEGREES
P OFFSET: 136 MS
P OFFSET: 142 MS
P OFFSET: 149 MS
P ONSET: 79 MS
P ONSET: 84 MS
P ONSET: 85 MS
PLATELET # BLD AUTO: 325 X10*3/UL (ref 150–450)
POTASSIUM SERPL-SCNC: 3.9 MMOL/L (ref 3.5–5.3)
PR INTERVAL: 264 MS
PR INTERVAL: 268 MS
PR INTERVAL: 270 MS
PROT SERPL-MCNC: 6.1 G/DL (ref 6.4–8.2)
Q ONSET: 214 MS
Q ONSET: 217 MS
Q ONSET: 218 MS
QRS COUNT: 11 BEATS
QRS COUNT: 12 BEATS
QRS COUNT: 13 BEATS
QRS DURATION: 92 MS
QRS DURATION: 92 MS
QRS DURATION: 94 MS
QT INTERVAL: 406 MS
QT INTERVAL: 410 MS
QT INTERVAL: 436 MS
QTC CALCULATION(BAZETT): 451 MS
QTC CALCULATION(BAZETT): 456 MS
QTC CALCULATION(BAZETT): 467 MS
QTC FREDERICIA: 438 MS
QTC FREDERICIA: 439 MS
QTC FREDERICIA: 457 MS
R AXIS: -18 DEGREES
R AXIS: -19 DEGREES
R AXIS: -26 DEGREES
RBC # BLD AUTO: 3.95 X10*6/UL (ref 4.5–5.9)
RIGHT VENTRICLE FREE WALL PEAK S': 11.6 CM/S
SODIUM SERPL-SCNC: 142 MMOL/L (ref 136–145)
T AXIS: 30 DEGREES
T AXIS: 46 DEGREES
T AXIS: 54 DEGREES
T OFFSET: 421 MS
T OFFSET: 422 MS
T OFFSET: 432 MS
TRICUSPID ANNULAR PLANE SYSTOLIC EXCURSION: 2.1 CM
UFH PPP CHRO-ACNC: 0.6 IU/ML
UFH PPP CHRO-ACNC: 0.6 IU/ML
VENTRICULAR RATE: 69 BPM
VENTRICULAR RATE: 73 BPM
VENTRICULAR RATE: 76 BPM
WBC # BLD AUTO: 4.7 X10*3/UL (ref 4.4–11.3)

## 2024-07-18 PROCEDURE — 36415 COLL VENOUS BLD VENIPUNCTURE: CPT | Performed by: STUDENT IN AN ORGANIZED HEALTH CARE EDUCATION/TRAINING PROGRAM

## 2024-07-18 PROCEDURE — 85520 HEPARIN ASSAY: CPT | Performed by: STUDENT IN AN ORGANIZED HEALTH CARE EDUCATION/TRAINING PROGRAM

## 2024-07-18 PROCEDURE — 93005 ELECTROCARDIOGRAM TRACING: CPT

## 2024-07-18 PROCEDURE — 2500000001 HC RX 250 WO HCPCS SELF ADMINISTERED DRUGS (ALT 637 FOR MEDICARE OP): Performed by: STUDENT IN AN ORGANIZED HEALTH CARE EDUCATION/TRAINING PROGRAM

## 2024-07-18 PROCEDURE — G0378 HOSPITAL OBSERVATION PER HR: HCPCS

## 2024-07-18 PROCEDURE — 82947 ASSAY GLUCOSE BLOOD QUANT: CPT

## 2024-07-18 PROCEDURE — 93306 TTE W/DOPPLER COMPLETE: CPT

## 2024-07-18 PROCEDURE — 2500000002 HC RX 250 W HCPCS SELF ADMINISTERED DRUGS (ALT 637 FOR MEDICARE OP, ALT 636 FOR OP/ED): Performed by: STUDENT IN AN ORGANIZED HEALTH CARE EDUCATION/TRAINING PROGRAM

## 2024-07-18 PROCEDURE — 96366 THER/PROPH/DIAG IV INF ADDON: CPT

## 2024-07-18 PROCEDURE — 85025 COMPLETE CBC W/AUTO DIFF WBC: CPT | Performed by: STUDENT IN AN ORGANIZED HEALTH CARE EDUCATION/TRAINING PROGRAM

## 2024-07-18 PROCEDURE — 99233 SBSQ HOSP IP/OBS HIGH 50: CPT | Performed by: INTERNAL MEDICINE

## 2024-07-18 PROCEDURE — 2500000004 HC RX 250 GENERAL PHARMACY W/ HCPCS (ALT 636 FOR OP/ED): Performed by: STUDENT IN AN ORGANIZED HEALTH CARE EDUCATION/TRAINING PROGRAM

## 2024-07-18 PROCEDURE — 2500000002 HC RX 250 W HCPCS SELF ADMINISTERED DRUGS (ALT 637 FOR MEDICARE OP, ALT 636 FOR OP/ED): Performed by: INTERNAL MEDICINE

## 2024-07-18 PROCEDURE — 99255 IP/OBS CONSLTJ NEW/EST HI 80: CPT | Performed by: INTERNAL MEDICINE

## 2024-07-18 PROCEDURE — 93306 TTE W/DOPPLER COMPLETE: CPT | Performed by: INTERNAL MEDICINE

## 2024-07-18 PROCEDURE — 84075 ASSAY ALKALINE PHOSPHATASE: CPT | Performed by: STUDENT IN AN ORGANIZED HEALTH CARE EDUCATION/TRAINING PROGRAM

## 2024-07-18 PROCEDURE — 2500000004 HC RX 250 GENERAL PHARMACY W/ HCPCS (ALT 636 FOR OP/ED): Performed by: INTERNAL MEDICINE

## 2024-07-18 PROCEDURE — 2500000001 HC RX 250 WO HCPCS SELF ADMINISTERED DRUGS (ALT 637 FOR MEDICARE OP): Performed by: INTERNAL MEDICINE

## 2024-07-18 RX ORDER — AMOXICILLIN 250 MG
2 CAPSULE ORAL NIGHTLY
Status: DISCONTINUED | OUTPATIENT
Start: 2024-07-18 | End: 2024-07-19 | Stop reason: HOSPADM

## 2024-07-18 RX ORDER — ISOSORBIDE MONONITRATE 60 MG/1
60 TABLET, EXTENDED RELEASE ORAL DAILY
Status: DISCONTINUED | OUTPATIENT
Start: 2024-07-19 | End: 2024-07-19 | Stop reason: HOSPADM

## 2024-07-18 RX ORDER — POLYETHYLENE GLYCOL 3350 17 G/17G
17 POWDER, FOR SOLUTION ORAL DAILY
Status: DISCONTINUED | OUTPATIENT
Start: 2024-07-18 | End: 2024-07-19 | Stop reason: HOSPADM

## 2024-07-18 ASSESSMENT — COGNITIVE AND FUNCTIONAL STATUS - GENERAL
MOVING TO AND FROM BED TO CHAIR: A LITTLE
STANDING UP FROM CHAIR USING ARMS: A LITTLE
WALKING IN HOSPITAL ROOM: A LITTLE
DAILY ACTIVITIY SCORE: 24
MOBILITY SCORE: 20
CLIMB 3 TO 5 STEPS WITH RAILING: A LITTLE

## 2024-07-18 ASSESSMENT — PAIN SCALES - GENERAL
PAINLEVEL_OUTOF10: 0 - NO PAIN
PAINLEVEL_OUTOF10: 2

## 2024-07-18 NOTE — PROGRESS NOTES
Cele Antony is a 78 y.o. male on day 0 of admission presenting with Chest pain.      Subjective   Patient is sitting on the bed comfortably, denies any chest pain shortness of breath or palpitations  No other significant overnight issues.      Objective     Last Recorded Vitals  /66   Pulse 73   Temp 37 °C (98.6 °F)   Resp 18   Wt 75 kg (165 lb 4.8 oz)   SpO2 95%   Intake/Output last 3 Shifts:    Intake/Output Summary (Last 24 hours) at 7/18/2024 1452  Last data filed at 7/18/2024 1234  Gross per 24 hour   Intake 407 ml   Output --   Net 407 ml       Admission Weight  Weight: 75.7 kg (166 lb 14.2 oz) (07/17/24 1545)    Daily Weight  07/17/24 : 75 kg (165 lb 4.8 oz)    Image Results  ECG 12 lead  Sinus rhythm with 1st degree AV block  Anterior infarct , age undetermined  Abnormal ECG  When compared with ECG of 13-APR-2024 19:51,  No significant change was found  See ED provider note for full interpretation and clinical correlation  Confirmed by Мария Landeros ([id number]) on 7/18/2024 10:09:56 AM  ECG 12 lead  Sinus rhythm with 1st degree AV block  Anterior infarct (cited on or before 17-JUL-2024)  Abnormal ECG  When compared with ECG of 17-JUL-2024 12:38, (unconfirmed)  No significant change was found  See ED provider note for full interpretation and clinical correlation  Confirmed by Мария Landeros ([id number]) on 7/18/2024 9:58:57 AM  ECG 12 Lead  Sinus rhythm with 1st degree AV block  Otherwise normal ECG  When compared with ECG of 17-JUL-2024 13:54, (unconfirmed)  No significant change was found  Transthoracic Echo (TTE) [place] [address]   Tel [phone] Fax [phone]    TRANSTHORACIC ECHOCARDIOGRAM REPORT    Patient Name:      CELE ANTONY     Reading Physician:    [id number] Thomas Martin MD, [facility]  Study Date:        7/18/2024            Ordering Provider:    [id number] SHERIDAN KRISHNAMURTHY  RACHEL  MRN/PID:           [id number]             Fellow:  Accession#:        [id number]         Nurse:  Date of Birth/Age: 1945 / 78      Sonographer:          Bernie guardado RDCS  Gender:            M                    Additional Staff:  Height:            170.18 cm            Admit Date:           7/17/2024  Weight:            74.84 kg             Admission Status:     Inpatient -                                                                Routine  BSA / BMI:         1.86 m2 / 25.84      Department Location:  [place] [address]                                      Echo Lab  Blood Pressure: 137 /77 mmHg    Study Type:    TRANSTHORACIC ECHO (TTE) COMPLETE  Diagnosis/ICD: Other chest pain-R07.89  Indication:    CP, PE  CPT Codes:     Echo Complete w Full [id number]    Patient History:  Smoker:            Former.  Diabetes:          Yes  Pertinent History: HTN, Hyperlipidemia, CAD, Chest Pain and Stents.    Study Detail: The following Echo studies were performed: 2D, M-Mode, Doppler and                color flow. The patient was awake.       PHYSICIAN INTERPRETATION:  Left Ventricle: The left ventricular systolic function is normal, with a visually estimated ejection fraction of 60-65%. There are no regional wall motion abnormalities. The left ventricular cavity size is normal. There is mild to moderate asymmetric left ventricular hypertrophy. Left ventricular diastolic filling was indeterminate. Patient with minimal LVH but rather remarkable discrete upper septal thickening no significant outflow tract obstruction with maximal gradient of 1.2 m/s.  Left Atrium: The left atrium is upper limits of normal in size.  Right Ventricle: The right ventricle is normal in size. There is normal right ventricular global systolic function. RVSP not calculated as no significant TR Doppler envelope.  Right Atrium: The right atrium is  normal in size.  Aortic Valve: The aortic valve is trileaflet. The aortic valve dimensionless index is 0.91. There is mild aortic valve regurgitation. The peak instantaneous gradient of the aortic valve is 8.5 mmHg. The mean gradient of the aortic valve is 5.0 mmHg. 1+ AI without evidence of aortic stenosis on a tricuspid valve.  Mitral Valve: The mitral valve is normal in structure. There is no evidence of mitral valve regurgitation.  Tricuspid Valve: The tricuspid valve is structurally normal. No evidence of tricuspid regurgitation. The right ventricular systolic pressure is unable to be estimated.  Pulmonic Valve: The pulmonic valve is structurally normal. There is no indication of pulmonic valve regurgitation.  Pericardium: There is no pericardial effusion noted. There is a pericardial fat pad present. There is very prominent fat pad particularly notable on the free wall RV. I think this less likely pericardial thickening. CT of the chest had shown no pericardial effusion and appearance unchanged from 2023.  Aorta: The aortic root is abnormal. There is mild dilatation of the aortic root. Aortic root mildly dilated at 4 cm. No progression form 2023 when measured at 4.2.       CONCLUSIONS:   1. The left ventricular systolic function is normal, with a visually estimated ejection fraction of 60-65%.   2. Left ventricular diastolic filling was indeterminate.   3. There is mild to moderate asymmetric left ventricular hypertrophy.   4. RVSP not calculated as no significant TR Doppler envelope.   5. There is normal right ventricular global systolic function.   6. There is very prominent fat pad particularly notable on the free wall RV. I think this less likely pericardial thickening. CT of the chest had shown no pericardial effusion and appearance unchanged from 2023.   7. 1+ AI without evidence of aortic stenosis on a tricuspid valve.   8. Mild aortic valve regurgitation.   9. Aortic root mildly dilated at 4 cm. No  progression form  when measured at 4.2.    QUANTITATIVE DATA SUMMARY:  2D MEASUREMENTS:                           Normal Ranges:  Ao Root d:     4.00 cm   (2.0-3.7cm)  LAs:           3.79 cm   (2.7-4.0cm)  IVSd:          0.80 cm   (0.6-1.1cm)  LVPWd:         0.78 cm   (0.6-1.1cm)  LVIDd:         4.44 cm   (3.9-5.9cm)  LVIDs:         3.10 cm  LV Mass Index: 58.4 g/m2  LV % FS        30.2 %    LA VOLUME:                                Normal Ranges:  LA Vol A4C:        36.8 ml    (22+/-6mL/m2)  LA Vol A2C:        41.9 ml  LA Vol BP:         40.7 ml  LA Vol Index A4C:  19.8ml/m2  LA Vol Index A2C:  22.5 ml/m2  LA Vol Index BP:   21.8 ml/m2  LA Area A4C:       16.4 cm2  LA Area A2C:       16.9 cm2  LA Major Axis A4C: 6.2 cm  LA Major Axis A2C: 5.8 cm  LA Volume Index:   21.2 ml/m2    RA VOLUME BY A/L METHOD:                                Normal Ranges:  RA Vol A4C:        34.7 ml    (8.3-19.5ml)  RA Vol Index A4C:  18.6 ml/m2  RA Area A4C:       15.2 cm2  RA Major Axis A4C: 5.7 cm    AORTA MEASUREMENTS:                     Normal Ranges:  Asc Ao, d: 3.91 cm (2.1-3.4cm)    LV SYSTOLIC FUNCTION BY 2D PLANIMETRY (MOD):                       Normal Ranges:  EF-A4C View:    61 % (>=55%)  EF-A2C View:    60 %  EF-Biplane:     60 %  EF-Visual:      63 %  LV EF Reported: 63 %    LV DIASTOLIC FUNCTION:                          Normal Ranges:  MV Peak E:    1.16 m/s  (0.7-1.2 m/s)  MV Peak A:    1.30 m/s  (0.42-0.7 m/s)  E/A Ratio:    0.89      (1.0-2.2)  MV e'         0.081 m/s (>8.0)  MV lateral e' 0.08 m/s  MV medial e'  0.08 m/s  E/e' Ratio:   14.31     (<8.0)    MITRAL VALVE:                  Normal Ranges:  MV DT: 188 msec (150-240msec)    AORTIC VALVE:                                    Normal Ranges:  AoV Vmax:                1.46 m/s (<=1.7m/s)  AoV Peak P.5 mmHg (<20mmHg)  AoV Mean P.0 mmHg (1.7-11.5mmHg)  LVOT Max Delvis:            1.25 m/s (<=1.1m/s)  AoV VTI:                 34.00  cm (18-25cm)  LVOT VTI:                31.10 cm  LVOT Diameter:           2.17 cm  (1.8-2.4cm)  AoV Area, VTI:           3.38 cm2 (2.5-5.5cm2)  AoV Area,Vmax:           3.17 cm2 (2.5-4.5cm2)  AoV Dimensionless Index: 0.91    AORTIC INSUFFICIENCY:  AI Vmax:       4.38 m/s  AI Half-time:  317 msec  AI Decel Rate: 371.00 cm/s2       RIGHT VENTRICLE:  RV Basal 2.88 cm  RV Mid   2.69 cm  RV Major 7.7 cm  TAPSE:   21.4 mm  RV s'    0.12 m/s    TRICUSPID VALVE/RVSP:                    Normal Ranges:  IVC Diam: 1.29 cm    PULMONIC VALVE:                          Normal Ranges:  PV Accel Time: 119 msec (>120ms)  PV Max Delvis:    0.7 m/s  (0.6-0.9m/s)  PV Max P.0 mmHg       [id number] Thomas Martin MD, FACC  Electronically signed on 2024 at 8:56:25 AM       ** Final **      Physical Exam  General: No distress  Neck: Supple.  HEENT: Normocephalic atraumatic pupils equal react light  Heart: S1-S2 heard no murmurs gallops regurgitation  Lungs: Clear to auscultation bilaterally no wheezing rales or rhonchi  Abdomen: Nondistended nontender bowel sounds are present    Relevant Results  Results for orders placed or performed during the hospital encounter of 24 (from the past 24 hour(s))   Troponin, High Sensitivity, 1 Hour   Result Value Ref Range    Troponin I, High Sensitivity 7 0 - 20 ng/L   Beta-2 glycoprotein antibodies   Result Value Ref Range    Beta-2 Glyco 1 IgG <1.4 <20.0 U/mL    Beta-2 Glyco 1 IgA <0.6 <20.0 U/mL    Beta 2 Glyco 1 IgM 0.7 <20.0 U/mL   Cardiolipin antibody   Result Value Ref Range    Anticardiolipin IgA <0.5 <20.0 APL U/mL    Anticardiolipin IgG <1.6 <20.0 GPL U/mL    Anticardiolipin IgM 1.2 <20.0 MPL U/mL   POCT GLUCOSE   Result Value Ref Range    POCT Glucose 103 (H) 74 - 99 mg/dL   Heparin Assay   Result Value Ref Range    Heparin Unfractionated 0.8 See Comment Below for Therapeutic Ranges IU/mL   Heparin Assay, UFH   Result Value Ref Range    Heparin Unfractionated 0.6 See Comment Below for  Therapeutic Ranges IU/mL   Comprehensive Metabolic Panel   Result Value Ref Range    Glucose 119 (H) 74 - 99 mg/dL    Sodium 142 136 - 145 mmol/L    Potassium 3.9 3.5 - 5.3 mmol/L    Chloride 107 98 - 107 mmol/L    Bicarbonate 28 21 - 32 mmol/L    Anion Gap 11 10 - 20 mmol/L    Urea Nitrogen 8 6 - 23 mg/dL    Creatinine 0.67 0.50 - 1.30 mg/dL    eGFR >90 >60 mL/min/1.73m*2    Calcium 8.9 8.6 - 10.3 mg/dL    Albumin 3.6 3.4 - 5.0 g/dL    Alkaline Phosphatase 57 33 - 136 U/L    Total Protein 6.1 (L) 6.4 - 8.2 g/dL    AST 13 9 - 39 U/L    Bilirubin, Total 0.5 0.0 - 1.2 mg/dL    ALT 11 10 - 52 U/L   CBC and Auto Differential   Result Value Ref Range    WBC 4.7 4.4 - 11.3 x10*3/uL    nRBC 0.0 0.0 - 0.0 /100 WBCs    RBC 3.95 (L) 4.50 - 5.90 x10*6/uL    Hemoglobin 12.9 (L) 13.5 - 17.5 g/dL    Hematocrit 37.7 (L) 41.0 - 52.0 %    MCV 95 80 - 100 fL    MCH 32.7 26.0 - 34.0 pg    MCHC 34.2 32.0 - 36.0 g/dL    RDW 14.3 11.5 - 14.5 %    Platelets 325 150 - 450 x10*3/uL    Neutrophils % 54.8 40.0 - 80.0 %    Immature Granulocytes %, Automated 0.0 0.0 - 0.9 %    Lymphocytes % 28.9 13.0 - 44.0 %    Monocytes % 11.8 2.0 - 10.0 %    Eosinophils % 3.2 0.0 - 6.0 %    Basophils % 1.3 0.0 - 2.0 %    Neutrophils Absolute 2.56 1.60 - 5.50 x10*3/uL    Immature Granulocytes Absolute, Automated 0.00 0.00 - 0.50 x10*3/uL    Lymphocytes Absolute 1.35 0.80 - 3.00 x10*3/uL    Monocytes Absolute 0.55 0.05 - 0.80 x10*3/uL    Eosinophils Absolute 0.15 0.00 - 0.40 x10*3/uL    Basophils Absolute 0.06 0.00 - 0.10 x10*3/uL   Heparin Assay, UFH   Result Value Ref Range    Heparin Unfractionated 0.6 See Comment Below for Therapeutic Ranges IU/mL   SST TOP   Result Value Ref Range    Extra Tube Hold for add-ons.    Transthoracic Echo (TTE) Complete   Result Value Ref Range    AV mn grad 5.0 mmHg    AV pk shannan 1.46 m/s    LV Biplane EF 60 %    LVOT diam 2.17 cm    MV E/A ratio 0.89     Tricuspid annular plane systolic excursion 2.1 cm    LA vol index A/L  21.8 ml/m2    LV EF 63 %    RV free wall pk S' 11.60 cm/s    LVIDd 4.44 cm    Aortic Valve Area by Continuity of Peak Velocity 3.17 cm2    AV pk grad 8.5 mmHg    Aortic Valve Area by Continuity of VTI 3.38 cm2    LV A4C EF 61.5    ECG 12 Lead   Result Value Ref Range    Ventricular Rate 69 BPM    Atrial Rate 69 BPM    WI Interval 270 ms    QRS Duration 94 ms    QT Interval 436 ms    QTC Calculation(Bazett) 467 ms    P Axis 10 degrees    R Axis -19 degrees    T Axis 30 degrees    QRS Count 11 beats    Q Onset 214 ms    P Onset 79 ms    P Offset 136 ms    T Offset 432 ms    QTC Fredericia 457 ms     ECG 12 lead    Result Date: 7/18/2024  Sinus rhythm with 1st degree AV block Anterior infarct , age undetermined Abnormal ECG When compared with ECG of 13-APR-2024 19:51, No significant change was found See ED provider note for full interpretation and clinical correlation Confirmed by Мария Landeros ([id number]) on 7/18/2024 10:09:56 AM    ECG 12 lead    Result Date: 7/18/2024  Sinus rhythm with 1st degree AV block Anterior infarct (cited on or before 17-JUL-2024) Abnormal ECG When compared with ECG of 17-JUL-2024 12:38, (unconfirmed) No significant change was found See ED provider note for full interpretation and clinical correlation Confirmed by Мария Landeros ([id number]) on 7/18/2024 9:58:57 AM    ECG 12 Lead    Result Date: 7/18/2024  Sinus rhythm with 1st degree AV block Otherwise normal ECG When compared with ECG of 17-JUL-2024 13:54, (unconfirmed) No significant change was found    Transthoracic Echo (TTE) Complete    Result Date: 7/18/2024          [place] [address]  Tel [phone] Fax [phone] TRANSTHORACIC ECHOCARDIOGRAM REPORT Patient Name:      CELE Man Physician:    [id number] Thomas Martin MD, [facility] Study Date:        7/18/2024            Ordering Provider:    [id number] SHERIDAN RAMOS MRN/PID:            [id number]             Fellow: Accession#:        [id number]         Nurse: Date of Birth/Age: 1945 / 78      Sonographer:          Bernie Martel                    geo                                      [facility] Gender:            M                    Additional Staff: Height:            170.18 cm            Admit Date:           7/17/2024 Weight:            74.84 kg             Admission Status:     Inpatient -                                                               Routine BSA / BMI:         1.86 m2 / 25.84      Department Location:  [place] [address]                                      Echo Lab Blood Pressure: 137 /77 mmHg Study Type:    TRANSTHORACIC ECHO (TTE) COMPLETE Diagnosis/ICD: Other chest pain-R07.89 Indication:    CP, PE CPT Codes:     Echo Complete w Full Doppler-[id number] Patient History: Smoker:            Former. Diabetes:          Yes Pertinent History: HTN, Hyperlipidemia, CAD, Chest Pain and Stents. Study Detail: The following Echo studies were performed: 2D, M-Mode, Doppler and               color flow. The patient was awake.  PHYSICIAN INTERPRETATION: Left Ventricle: The left ventricular systolic function is normal, with a visually estimated ejection fraction of 60-65%. There are no regional wall motion abnormalities. The left ventricular cavity size is normal. There is mild to moderate asymmetric left ventricular hypertrophy. Left ventricular diastolic filling was indeterminate. Patient with minimal LVH but rather remarkable discrete upper septal thickening no significant outflow tract obstruction with maximal gradient of 1.2 m/s. Left Atrium: The left atrium is upper limits of normal in size. Right Ventricle: The right ventricle is normal in size. There is normal right ventricular global systolic function. RVSP not calculated as no significant TR Doppler envelope. Right Atrium: The right atrium is normal in size. Aortic Valve: The aortic valve is trileaflet.  The aortic valve dimensionless index is 0.91. There is mild aortic valve regurgitation. The peak instantaneous gradient of the aortic valve is 8.5 mmHg. The mean gradient of the aortic valve is 5.0 mmHg. 1+ AI without evidence of aortic stenosis on a tricuspid valve. Mitral Valve: The mitral valve is normal in structure. There is no evidence of mitral valve regurgitation. Tricuspid Valve: The tricuspid valve is structurally normal. No evidence of tricuspid regurgitation. The right ventricular systolic pressure is unable to be estimated. Pulmonic Valve: The pulmonic valve is structurally normal. There is no indication of pulmonic valve regurgitation. Pericardium: There is no pericardial effusion noted. There is a pericardial fat pad present. There is very prominent fat pad particularly notable on the free wall RV. I think this less likely pericardial thickening. CT of the chest had shown no pericardial effusion and appearance unchanged from 2023. Aorta: The aortic root is abnormal. There is mild dilatation of the aortic root. Aortic root mildly dilated at 4 cm. No progression form 2023 when measured at 4.2.  CONCLUSIONS:  1. The left ventricular systolic function is normal, with a visually estimated ejection fraction of 60-65%.  2. Left ventricular diastolic filling was indeterminate.  3. There is mild to moderate asymmetric left ventricular hypertrophy.  4. RVSP not calculated as no significant TR Doppler envelope.  5. There is normal right ventricular global systolic function.  6. There is very prominent fat pad particularly notable on the free wall RV. I think this less likely pericardial thickening. CT of the chest had shown no pericardial effusion and appearance unchanged from 2023.  7. 1+ AI without evidence of aortic stenosis on a tricuspid valve.  8. Mild aortic valve regurgitation.  9. Aortic root mildly dilated at 4 cm. No progression form 2023 when measured at 4.2. QUANTITATIVE DATA SUMMARY: 2D  MEASUREMENTS:                          Normal Ranges: Ao Root d:     4.00 cm   (2.0-3.7cm) LAs:           3.79 cm   (2.7-4.0cm) IVSd:          0.80 cm   (0.6-1.1cm) LVPWd:         0.78 cm   (0.6-1.1cm) LVIDd:         4.44 cm   (3.9-5.9cm) LVIDs:         3.10 cm LV Mass Index: 58.4 g/m2 LV % FS        30.2 % LA VOLUME:                               Normal Ranges: LA Vol A4C:        36.8 ml    (22+/-6mL/m2) LA Vol A2C:        41.9 ml LA Vol BP:         40.7 ml LA Vol Index A4C:  19.8ml/m2 LA Vol Index A2C:  22.5 ml/m2 LA Vol Index BP:   21.8 ml/m2 LA Area A4C:       16.4 cm2 LA Area A2C:       16.9 cm2 LA Major Axis A4C: 6.2 cm LA Major Axis A2C: 5.8 cm LA Volume Index:   21.2 ml/m2 RA VOLUME BY A/L METHOD:                               Normal Ranges: RA Vol A4C:        34.7 ml    (8.3-19.5ml) RA Vol Index A4C:  18.6 ml/m2 RA Area A4C:       15.2 cm2 RA Major Axis A4C: 5.7 cm AORTA MEASUREMENTS:                    Normal Ranges: Asc Ao, d: 3.91 cm (2.1-3.4cm) LV SYSTOLIC FUNCTION BY 2D PLANIMETRY (MOD):                      Normal Ranges: EF-A4C View:    61 % (>=55%) EF-A2C View:    60 % EF-Biplane:     60 % EF-Visual:      63 % LV EF Reported: 63 % LV DIASTOLIC FUNCTION:                         Normal Ranges: MV Peak E:    1.16 m/s  (0.7-1.2 m/s) MV Peak A:    1.30 m/s  (0.42-0.7 m/s) E/A Ratio:    0.89      (1.0-2.2) MV e'         0.081 m/s (>8.0) MV lateral e' 0.08 m/s MV medial e'  0.08 m/s E/e' Ratio:   14.31     (<8.0) MITRAL VALVE:                 Normal Ranges: MV DT: 188 msec (150-240msec) AORTIC VALVE:                                   Normal Ranges: AoV Vmax:                1.46 m/s (<=1.7m/s) AoV Peak P.5 mmHg (<20mmHg) AoV Mean P.0 mmHg (1.7-11.5mmHg) LVOT Max Delvis:            1.25 m/s (<=1.1m/s) AoV VTI:                 34.00 cm (18-25cm) LVOT VTI:                31.10 cm LVOT Diameter:           2.17 cm  (1.8-2.4cm) AoV Area, VTI:           3.38 cm2 (2.5-5.5cm2) AoV  Area,Vmax:           3.17 cm2 (2.5-4.5cm2) AoV Dimensionless Index: 0.91 AORTIC INSUFFICIENCY: AI Vmax:       4.38 m/s AI Half-time:  317 msec AI Decel Rate: 371.00 cm/s2  RIGHT VENTRICLE: RV Basal 2.88 cm RV Mid   2.69 cm RV Major 7.7 cm TAPSE:   21.4 mm RV s'    0.12 m/s TRICUSPID VALVE/RVSP:                   Normal Ranges: IVC Diam: 1.29 cm PULMONIC VALVE:                         Normal Ranges: PV Accel Time: 119 msec (>120ms) PV Max Delvis:    0.7 m/s  (0.6-0.9m/s) PV Max P.0 mmHg  [id number] Thomas Martin MD, [facility] Electronically signed on 2024 at 8:56:25 AM  ** Final **     Lower extremity venous duplex bilateral    Result Date: 2024  Interpreted By:  Dora Escobar, STUDY: [facility] LOWER EXTREMITY VENOUS DUPLEX BILATERAL; 2024 6:03 pm   INDICATION: Signs/Symptoms:leg swelling.   COMPARISON: 2023   ACCESSION NUMBER(S): [id number]   ORDERING CLINICIAN: SHERIDAN RAMOS   TECHNIQUE: 2D grayscale and color-flow duplex images were obtained along with Doppler spectral waveform analysis of the deep venous system of the both lower extremities from the groin to the knee. Attempts were made to image the calf veins. Venous compression and augmentation were performed.   FINDINGS: Right Lower Extremity: There is normal compressibility and flow within the visualized vessels of the deep venous system of this lower extremity.   Left lower Extremity: There is normal compressibility and flow within the visualized vessels of the deep venous system of this lower extremity.         No sign of acute deep venous thrombosis in the lower extremities.     MACRO: none   Signed by: Dora Escobar 2024 7:02 PM Dictation workstation:   [id number]    CT angio chest for pulmonary embolism    Result Date: 2024  Interpreted By:  Satya Sanches, STUDY: CT ANGIO CHEST FOR PULMONARY EMBOLISM;  2024 3:09 pm   INDICATION: Signs/Symptoms:Pleuritic chest pain, recent surgery, elevated D-dimer.   COMPARISON: None.   ACCESSION  NUMBER(S): [id number]   ORDERING CLINICIAN: YEIMY ALBRECHT   TECHNIQUE: Helical data acquisition of the chest was obtained with 75 mL Omnipaque 350. Images were reformatted in coronal and sagittal planes. Axial and coronal MIP images were created and reviewed.   FINDINGS: POTENTIAL LIMITATIONS OF THE STUDY: None   HEART AND VESSELS: No discrete filling defects within the main pulmonary artery or its branches.   Small eccentric filling defect about the right lower lobe at the proximal segmental division. No additional filling defects identified within the bilateral lungs.   The thoracic aorta is of normal course and caliber.   Dense coronary artery calcifications and/or stents.   The cardiac chambers are not enlarged.   No evidence of pericardial effusion.   MEDIASTINUM AND VICKI, LOWER NECK AND AXILLA: The visualized thyroid gland is within normal limits.   No evidence of thoracic lymphadenopathy by CT criteria.   Esophagus appears within normal limits as seen.   LUNGS AND AIRWAYS: The trachea and central airways are patent. No endobronchial lesion.   Small right pleural effusion.   UPPER ABDOMEN: The visualized subdiaphragmatic structures demonstrate no remarkable findings.   CHEST WALL AND OSSEOUS STRUCTURES: There are no suspicious osseous lesions. Multilevel degenerative changes are present       1. Small volume nonocclusive pulmonary embolus within the right lower lobe. 2. Small right pleural effusion.   MACRO: None   Signed by: Satya Sanches 7/17/2024 3:27 PM Dictation workstation:   [id number]    XR chest 1 view    Result Date: 7/17/2024  Interpreted By:  Roe Graves, STUDY: XR CHEST 1 VIEW   INDICATION: Signs/Symptoms:Chest Pain.   COMPARISON: April 13, 2024   ACCESSION NUMBER(S): [id number]   ORDERING CLINICIAN: YEIMY ALBRECHT   FINDINGS: Interval development of small bilateral pleural effusions right-greater-than-left.   No gross consolidation. Cardiomegaly unchanged.       New small bilateral  pleural effusions right slightly greater than left. No other acute findings.   Signed by: Roe Graves 7/17/2024 2:07 PM Dictation workstation:   [id number]    FL GI cholangiography intraop    Result Date: 7/2/2024  Interpreted By:  Alex Daniels, STUDY: [place] GI CHOLANGIOGRAPHY INTRAOP S;  7/1/2024 2:00 pm   INDICATION: Signs/Symptoms:intra op.   COMPARISON: Right upper quadrant ultrasound 4 June 2024   ACCESSION NUMBER(S): [id number]   ORDERING CLINICIAN: MANOLO CONNOLLY   TECHNIQUE: Multiple spot fluoroscopic images from intraoperative cholangiogram were submitted by the department of surgery   Fluoroscopy time: 0 minutes, 16 seconds   FINDINGS: Contrast leak:  Negative. No pathologic extraluminal contrast from a duct.   Ductal stone:  Negative   Transit of contrast into the duodenum:  Within expected limits, without stricture or other obstruction   Caliber and configuration of the contrast-opacified biliary system: Within expected limits       UNREMARKABLE INTRAOPERATIVE CHOLANGIOGRAM   MACRO: None   Signed by: Alex Daniels 7/2/2024 10:59 AM Dictation workstation:   [id number]         Assessment/Plan   Atypical chest pain.  Acute pulmonary embolism-noticed on CT chest.    History of:  Coronary artery disease s/p PCI.  Hypertension.  Hyperlipidemia.  Diabetes mellitus type 2.  Depression/anxiety.  Seizures-on phenytoin.    Plan:  Currently patient is chest pain-free.  Echocardiogram results reviewed, showed ejection fraction of 60 to 35% with no wall motion abnormalities, no right heart strain's.  Cardiology evaluated the patient, recommended to discontinue aspirin and continue Plavix.  Also recommended to transition from heparin by weight to Eliquis.  Discontinued heparin by weight, started on Eliquis on 7/18.  Cardiology signed off.  Blood pressure running high 150s systolic-continuing Lasix 40, losartan 50, Imdur 60 daily, blood pressure fairly well-controlled with the current regimen.  Other Home medications  reviewed and resumed appropriately.  PT OT evaluation.  Follow-up CBC BMP ordered.  DVT prophylaxis-patient is on Eliquis.    Disposition:  Possible discharge in next 24 hours.      Balbir Dumont MD

## 2024-07-18 NOTE — CONSULTS
Inpatient consult to Cardiology  Consult performed by: HARRISON Hunt-CNP  Consult ordered by: Cecy Easley MD  Reason for consult: chest pain      Cardiology Consult Note      Date:   7/18/2024  Patient name:  Lalo Antony  Date of admission:  7/17/2024 12:32 PM  MRN:   [id number]  YOB: 1945  Time of Consult:  9:04 AM    Consulting Cardiologist: HARRISON Yu, CNP  Primary Cardiologist:  Dr. Jeanmarie Cooper    Referring Provider: Dr Dumont      Admission Diagnosis:     Chest pain      History of Present Illness:      Lalo Antony is a 78 y.o.  male patient who is being at the request of Dr. Dumont for inpatient consultation of angina. He was admitted on 7/17/2024.  Previous [facility] and [facility] records have been reviewed in detail.    Patient with a history of CAD, diabetes, hypertension, dyslipidemia, anxiety, seizure  Patient states that yesterday he felt some palpitations then he developed left-sided chest pain he describes it as very sharp in nature he called 911 was picked up by the ambulance he said that he was feeling the chest pain again in the ambulance paramedics said he seen the chest pain on the monitor he was not quite sure what that meant.  He states that the chest pain went away he went on to tell me that approximately 2 weeks ago he had a laparoscopic cholecystectomy done here at [facility].  States he was recovering pretty well at home but in the last couple days he has been having palpitations they did do a CT of the chest that showed small PE on the right side.  He is very anxious this morning he went on the details to tell me that he has 13 stents the last time he had a stent was in 2/3/2023 he had a stent placed to his circumflex artery.  He had an echo done last night and ejection fraction 60%.  He denies fever, chills, nausea, vomiting, PND, orthopnea, claudication  Positive for shortness of breath, chest pain, fatigue,  palpitations  EKG is normal sinus rhythm first-degree AV block  Troponin 9, 7  Magnesium 2.04    Cardiac history  Cath 2/3/23    Coronary Angiography:  The coronary circulation is right dominant.    Left Main Coronary Artery:  The left main coronary artery is free of atherosclerotic disease.    Left Anterior Descending Coronary Artery Distribution:  The Left Anterior Descending artery is a large vessel. Presents luminal irregularities and contains patent previously placed stents.    Circumflex Coronary Artery Distribution:  The Left Circumflex artery is a large vessel. Hemodynamically significant obstruction is noted in this vessel. There is 75% stenosis in the the distal Circumflex artery. The device advanced to the the distal CX lesion was:Resolute Tai 3.00x15 stent was deployed in the lesion. Residual stenosis is 0%.    Right Coronary Artery Distribution:    The Right Coronary Artery is a large vessel. Presents luminal irregularities, hemodynamically significant obstruction is noted in this vessel, contains patent previously placed stents and fills via collaterals. There is 100% stenosis in the the Posterior Lateral Branch Right Coronary Artery.      Left Ventriculography:  The estimated left ventricular ejection fraction is 50%.      Allergies:     Allergies   Allergen Reactions    Beta-Blockers (Beta-Adrenergic Blocking Agts) Unknown     DOES NOT REMEMBER    Rosuvastatin Rash and Swelling     severe muscle pain         Past Medical History:     Past Medical History:   Diagnosis Date    Basal cell carcinoma     SKIN    BPH (benign prostatic hyperplasia)     Cholelithiasis     Coronary artery disease     COVID-19     VACCINATED    Diarrhea     Dizziness     Epilepsy (Multi)     Generalized anxiety disorder     GERD (gastroesophageal reflux disease)     Hyperlipidemia     Hypertension     PONV (postoperative nausea and vomiting)     VIOLENT AND AGGRESSIVE X 1 EPISODE    Type 2 diabetes mellitus (Multi)      REMISSION    Wears dentures     Wears glasses        Past Surgical History:     Past Surgical History:   Procedure Laterality Date    OTHER SURGICAL HISTORY  2019    Inguinal hernia repair    OTHER SURGICAL HISTORY  10/27/2021    Percutaneous transluminal coronary angioplasty    OTHER SURGICAL HISTORY  10/27/2021    Skin lesion excision    OTHER SURGICAL HISTORY  2020    Excision of basal cell carcinoma    OTHER SURGICAL HISTORY  2020    Tooth extraction    OTHER SURGICAL HISTORY  2020    Colonoscopy complete for polypectomy    OTHER SURGICAL HISTORY  2020    Stomach biopsy    OTHER SURGICAL HISTORY  2020    Esophagogastroduodenoscopy    OTHER SURGICAL HISTORY  2020    Cardiac catheterization with stent placement       Family History:     Family History   Problem Relation Name Age of Onset    Lung cancer Mother      Cancer Mother      Coronary artery disease Father      Heart attack Father      Skin cancer Son         Social History:     Social History     Tobacco Use    Smoking status: Every Day     Current packs/day: 0.00     Types: Cigarettes     Last attempt to quit: 1990     Years since quittin.5    Smokeless tobacco: Never   Vaping Use    Vaping status: Never Used   Substance Use Topics    Alcohol use: Not Currently    Drug use: Never       CURRENT INPATIENT MEDICATIONS    aspirin, 81 mg, oral, Daily  clonazePAM, 0.5 mg, oral, BID  clopidogrel, 75 mg, oral, Daily  ezetimibe, 10 mg, oral, Nightly  furosemide, 40 mg, oral, Daily  [START ON 2024] isosorbide mononitrate ER, 60 mg, oral, Daily  losartan, 50 mg, oral, Daily  magnesium oxide, 400 mg, oral, Daily  perflutren lipid microspheres, 0.5-10 mL of dilution, intravenous, Once in imaging  perflutren protein A microsphere, 0.5 mL, intravenous, Once in imaging  phenytoin ER, 100 mg, oral, TID  simvastatin, 40 mg, oral, Nightly  sulfur hexafluoride microsphr, 2 mL, intravenous, Once in imaging      heparin,  "0-4,500 Units/hr, Last Rate: 1,200 Units/hr (07/18/24 0148)      Current Outpatient Medications   Medication Instructions    aspirin 81 mg, oral, Daily    clonazePAM (KLONOPIN) 0.5 mg, oral, 2 times daily    clopidogrel (PLAVIX) 75 mg, oral, Daily RT    dicyclomine (BENTYL) 20 mg, oral, 4 times daily PRN    Dilantin Extended 100 mg, oral, 3 times daily    ezetimibe (ZETIA) 10 mg, oral, Daily    furosemide (Lasix) 40 mg tablet TAKE 1 TABLET BY MOUTH ONCE DAILY    HYDROcodone-acetaminophen (Norco) 5-325 mg tablet 1 tablet, oral, Every 6 hours PRN    isosorbide mononitrate ER (IMDUR) 30 mg, oral, Daily    losartan (COZAAR) 50 mg, oral, Daily    magnesium oxide (Mag-Ox) 400 mg (241.3 mg magnesium) tablet 1 tablet, oral, Daily    nitroglycerin (Nitrostat) 0.4 mg SL tablet DISSOLVE 1 TABLET UNDER TONGUE EVERY 5 MINUTES FOR UP TO 3 DOSES AS NEEDED FOR CHEST PAIN. IF PAIN PERSISTS DIAL 911 OR GO TO ER.    simvastatin (Zocor) 40 mg tablet TAKE 1 TABLET BY MOUTH EVERY NIGHT AT BEDTIME        Review of Systems:      12 point review of systems was obtained in detail and is negative other than that detailed above.    Vital Signs:     Vitals:    07/17/24 1828 07/17/24 2005 07/18/24 0038 07/18/24 0721   BP: 151/72 150/80 137/77 154/72   BP Location: Right arm Right arm  Right arm   Patient Position: Sitting Sitting  Lying   Pulse: 71 67 64 67   Resp: 16 18 18   Temp: 36.9 °C (98.4 °F) 37.4 °C (99.3 °F) 36.4 °C (97.5 °F) 36.7 °C (98.1 °F)   TempSrc: Temporal Temporal  Temporal   SpO2: 98% 97% 95% 97%   Weight: 75 kg (165 lb 4.8 oz)      Height: 1.702 m (5' 7.01\")          Intake/Output Summary (Last 24 hours) at 7/18/2024 0904  Last data filed at 7/18/2024 0351  Gross per 24 hour   Intake 302.4 ml   Output --   Net 302.4 ml       Wt Readings from Last 4 Encounters:   07/17/24 75 kg (165 lb 4.8 oz)   07/01/24 75.7 kg (166 lb 14.2 oz)   06/20/24 76.7 kg (169 lb)   06/04/24 80.7 kg (178 lb)       Physical Examination:     GENERAL " APPEARANCE: Well developed, well nourished, in no acute distress.  CHEST: Symmetric and non-tender.  INTEGUMENT: Skin warm and dry, without gross excoriationis or lesions.  HEENT: No gross abnormalities of conjunctiva, teeth, gums, oral mucosa  NECK: Supple, no JVD, no bruit. Thyroid not palpable. Carotid upstrokes normal.  NEURO/PSHCY: Alert and oriented x3; appropriate behavior and responses and responses, grossly normal cerebellar function with normal balance and coordination  LUNGS: Clear to auscultation bilaterally; normal respiratory effort.  HEART: Rate and rhythm regular with no evident murmur; no gallop appreciated. There are no rubs, clicks or heaves. PMI nondisplaced.  ABDOMEN: Soft, nontender, no palpable hepatosplenomegaly, no mases, no bruits. Abdominal aorta not noted to be enlarged.  MUSCULOSKELETAL: Ambulatory with normal tandem gait.  EXTREMITIES: Warm with good color, no clubbing or cyanois trace edema  PERIPHERAL VASCULAR: Pulses present and equally palpable; 1+ throughout. No femoral bruits.      Lab:     CBC:   Results from last 7 days   Lab Units 07/18/24  0604 07/17/24  1246   WBC AUTO x10*3/uL 4.7 5.4   RBC AUTO x10*6/uL 3.95* 4.11*   HEMOGLOBIN g/dL 12.9* 13.6   HEMATOCRIT % 37.7* 39.6*   MCV fL 95 96   MCH pg 32.7 33.1   MCHC g/dL 34.2 34.3   RDW % 14.3 14.2   PLATELETS AUTO x10*3/uL 325 375     CMP:    Results from last 7 days   Lab Units 07/18/24  0604 07/17/24  1246   SODIUM mmol/L 142 140   POTASSIUM mmol/L 3.9 3.9   CHLORIDE mmol/L 107 106   CO2 mmol/L 28 26   BUN mg/dL 8 9   CREATININE mg/dL 0.67 0.79   GLUCOSE mg/dL 119* 146*   PROTEIN TOTAL g/dL 6.1* 6.3*   CALCIUM mg/dL 8.9 9.3   BILIRUBIN TOTAL mg/dL 0.5 0.5   ALK PHOS U/L 57 61   AST U/L 13 15   ALT U/L 11 11     BMP:    Results from last 7 days   Lab Units 07/18/24  0604 07/17/24  1246   SODIUM mmol/L 142 140   POTASSIUM mmol/L 3.9 3.9   CHLORIDE mmol/L 107 106   CO2 mmol/L 28 26   BUN mg/dL 8 9   CREATININE mg/dL 0.67 0.79    CALCIUM mg/dL 8.9 9.3   GLUCOSE mg/dL 119* 146*     Magnesium:  Results from last 7 days   Lab Units 07/17/24  1246   MAGNESIUM mg/dL 2.04     Troponin:    Results from last 7 days   Lab Units 07/17/24  1554 07/17/24  1246   TROPHS ng/L 7 9     BNP:     Lipid Panel:         Diagnostic Studies:   @No results found for this or any previous visit.    Transthoracic Echo (TTE) Complete  CONCLUSIONS:   1. The left ventricular systolic function is normal, with a visually estimated ejection fraction of 60-65%.   2. Left ventricular diastolic filling was indeterminate.   3. There is mild to moderate asymmetric left ventricular hypertrophy.   4. RVSP not calculated as no significant TR Doppler envelope.   5. There is normal right ventricular global systolic function.   6. There is very prominent fat pad particularly notable on the free wall RV. I think this less likely pericardial thickening. CT of the chest had shown no pericardial effusion and appearance unchanged from 2023.   7. 1+ AI without evidence of aortic stenosis on a tricuspid valve.   8. Mild aortic valve regurgitation.   9. Aortic root mildly dilated at 4 cm. No progression form 2023 when measured at 4.2.    Lower extremity venous duplex bilateral    Result Date: 7/17/2024  Interpreted By:  Dora Escobar, STUDY: [facility] LOWER EXTREMITY VENOUS DUPLEX BILATERAL; 7/17/2024 6:03 pm   INDICATION: Signs/Symptoms:leg swelling.   COMPARISON: 05/12/2023   ACCESSION NUMBER(S): [id number]   ORDERING CLINICIAN: SHERIDAN RAMOS   TECHNIQUE: 2D grayscale and color-flow duplex images were obtained along with Doppler spectral waveform analysis of the deep venous system of the both lower extremities from the groin to the knee. Attempts were made to image the calf veins. Venous compression and augmentation were performed.   FINDINGS: Right Lower Extremity: There is normal compressibility and flow within the visualized vessels of the deep venous system of this lower extremity.   Left  lower Extremity: There is normal compressibility and flow within the visualized vessels of the deep venous system of this lower extremity.         No sign of acute deep venous thrombosis in the lower extremities.     MACRO: none   Signed by: Dora Escobar 7/17/2024 7:02 PM Dictation workstation:   [id number]    CT angio chest for pulmonary embolism    Result Date: 7/17/2024  Interpreted By:  Satya Sanches, STUDY: CT ANGIO CHEST FOR PULMONARY EMBOLISM;  7/17/2024 3:09 pm   INDICATION: Signs/Symptoms:Pleuritic chest pain, recent surgery, elevated D-dimer.   COMPARISON: None.   ACCESSION NUMBER(S): [id number]   ORDERING CLINICIAN: YEIMY ALBRECHT   TECHNIQUE: Helical data acquisition of the chest was obtained with 75 mL Omnipaque 350. Images were reformatted in coronal and sagittal planes. Axial and coronal MIP images were created and reviewed.   FINDINGS: POTENTIAL LIMITATIONS OF THE STUDY: None   HEART AND VESSELS: No discrete filling defects within the main pulmonary artery or its branches.   Small eccentric filling defect about the right lower lobe at the proximal segmental division. No additional filling defects identified within the bilateral lungs.   The thoracic aorta is of normal course and caliber.   Dense coronary artery calcifications and/or stents.   The cardiac chambers are not enlarged.   No evidence of pericardial effusion.   MEDIASTINUM AND VICKI, LOWER NECK AND AXILLA: The visualized thyroid gland is within normal limits.   No evidence of thoracic lymphadenopathy by CT criteria.   Esophagus appears within normal limits as seen.   LUNGS AND AIRWAYS: The trachea and central airways are patent. No endobronchial lesion.   Small right pleural effusion.   UPPER ABDOMEN: The visualized subdiaphragmatic structures demonstrate no remarkable findings.   CHEST WALL AND OSSEOUS STRUCTURES: There are no suspicious osseous lesions. Multilevel degenerative changes are present       1. Small volume nonocclusive  pulmonary embolus within the right lower lobe. 2. Small right pleural effusion.   MACRO: None   Signed by: Satya Sanches 7/17/2024 3:27 PM Dictation workstation:   [id number]    XR chest 1 view    Result Date: 7/17/2024  Interpreted By:  Roe Graves, STUDY: XR CHEST 1 VIEW   INDICATION: Signs/Symptoms:Chest Pain.   COMPARISON: April 13, 2024   ACCESSION NUMBER(S): [id number]   ORDERING CLINICIAN: YEIMY ALBRECHT   FINDINGS: Interval development of small bilateral pleural effusions right-greater-than-left.   No gross consolidation. Cardiomegaly unchanged.       New small bilateral pleural effusions right slightly greater than left. No other acute findings.   Signed by: Roe Graves 7/17/2024 2:07 PM Dictation workstation:   [id number]    ECG 12 lead    Result Date: 7/17/2024  Sinus rhythm with 1st degree AV block Anterior infarct (cited on or before 17-JUL-2024) Abnormal ECG When compared with ECG of 17-JUL-2024 12:38, (unconfirmed) No significant change was found See ED provider note for full interpretation and clinical correlation    ECG 12 lead    Result Date: 7/17/2024  Sinus rhythm with 1st degree AV block Anterior infarct , age undetermined Abnormal ECG When compared with ECG of 13-APR-2024 19:51, No significant change was found See ED provider note for full interpretation and clinical correlation          Radiology:     Transthoracic Echo (TTE) Complete   Final Result      Lower extremity venous duplex bilateral   Final Result   No sign of acute deep venous thrombosis in the lower extremities.             MACRO:   none        Signed by: Dora Escobar 7/17/2024 7:02 PM   Dictation workstation:   [id number]      CT angio chest for pulmonary embolism   Final Result   1. Small volume nonocclusive pulmonary embolus within the right lower   lobe.   2. Small right pleural effusion.        MACRO:   None        Signed by: Satya Sanches 7/17/2024 3:27 PM   Dictation workstation:   [id number]      XR chest 1 view    Final Result   New small bilateral pleural effusions right slightly greater than   left. No other acute findings.        Signed by: Roe Graves 7/17/2024 2:07 PM   Dictation workstation:   [id number]          Problem List:     Patient Active Problem List   Diagnosis    CAD S/P percutaneous coronary angioplasty    Benign essential hypertension    Benign prostatic hyperplasia    Epilepsy (Multi)    Generalized anxiety disorder    GERD without esophagitis    Hyperlipidemia, mixed    Vitamin D deficiency    Former smoker    Type 2 diabetes mellitus with other circulatory complication, without long-term current use of insulin (Multi)    Diarrhea    Dizziness    Generalized abdominal pain    Headache    Malignant basal cell neoplasm of skin    Unexplained weight loss    Gallstones    Chest pain       Assessment:   Pulmonary embolism  Small right pleural effusion  Chest pain history of CAD  Hypertension  Dyslipidemia  Anxiety  Diabetes  History of seizures    Plan:   Tele monitoring  Serial enzymes  2d echo  Daily EKG's  Continue the heparin drip for now  Aspirin 81 mg daily  Plavix 75 mg daily  Patient states she is allergic to beta-blockers  Increase Imdur to 60 mg daily  Check a lipid profile has allergies to statins  Keep magnesium greater than 2.0  Keep potassium 4.0-4.5  Further recommendations per Dr. Veronica Arreola CNP  [facility]    CARDIOLOGIST NOTE  I have personally seen and examined patient as well as personally formulated treatment plan.  I have reviewed this note as created by HARRISON Marks, CNP and agree with his findings and physical exam.    78-year-old gentleman with known coronary disease and previous multivessel angioplasty at 2-week status post laparoscopic cholecystectomy.  Sudden development of shortness of breath, slight hemoptysis pleuritic chest discomfort and increased anxiety.  Presentation emergency department no acute  EKG changes right pulmonary embolism low volume elevated D-dimer and normal troponin.  No EKG changes.  He notes much less chest discomfort since he has been here.  Still shortness of breath.  Occasional hemoptysis but better than it was.  Has palpitations though simultaneous monitoring shows sinus rhythm.  He is had no dizzy, lightheadedness or syncope    Echocardiogram 7/18/2024  1. The left ventricular systolic function is normal, with a visually estimated ejection fraction of 60-65%.   2. Left ventricular diastolic filling was indeterminate.   3. There is mild to moderate asymmetric left ventricular hypertrophy.   4. RVSP not calculated as no significant TR Doppler envelope.   5. There is normal right ventricular global systolic function.   6. There is very prominent fat pad particularly notable on the free wall RV. I think this less likely pericardial thickening. CT of the chest had shown no pericardial effusion and appearance unchanged from 2023.   7. 1+ AI without evidence of aortic stenosis on a tricuspid valve.   8. Mild aortic valve regurgitation.   9. Aortic root mildly dilated at 4 cm. No progression form 2023 when measured at 4.2.    Laboratories pertinent for normal troponin, no EKG changes elevated D-dimer CT positive for PE    Impression  Chest discomfort: His chest pain is for the most part pleuritic.  He had significant amount of chest pain yet EKG and troponins remain unremarkable.  Pain can be explained by his pulmonary embolism as can the shortness of breath.  He is 1 year since coronary angiography showed irregularities of the RCA and LAD with patent stents.  He did require distal circumflex stent at that time.  Coronary artery disease: Low likelihood of progression based on lack of EKG changes and normal troponin  Acute pulmonary embolism 2 weeks postop.  Recent surgery likely cause.  No evidence of high-volume PE by CT also echo shows no RV dilatation or evidence of pulmonary hypertension.   Would treat with 3 to 6 months of full anticoagulation.  Would discontinue aspirin in favor of Plavix alone with either Eliquis or Xarelto.  Palpitations: Some of his palpitations if not all at least here in the hospital or anxiety.  Monitoring has shown no dysrhythmia.  He told me he was having significant palpitations while I watched his monitor which showed sinus rhythm throughout.  Certainly at risk of dysrhythmia and but no evidence at this time  Cardiovascular stable at this point he has follow-up in cardiology August.  I did review with him that indeed it is possible he could develop angina or other cardiac complications having had a pulmonary embolism but his pulmonary embolism is relatively low and those risks would be considered low.  I reviewed with him that should he have further difficulties during the hospital stay we would certainly come back to reevaluate.  He tells me he has considerable anxiety and wanted to be sure he can keep on his antianxiety medications I see no contraindication.    Thomas Martin MD        Of note, this documentation is completed using the Dragon Dictation system (voice recognition software). There may be spelling and/or grammatical errors that were not corrected prior to final submission.      Electronically signed by JHONATHAN Hunt, on 7/18/2024 at 9:04 AM

## 2024-07-18 NOTE — PROGRESS NOTES
07/18/24 1011   Discharge Planning   Living Arrangements Spouse/significant other   Support Systems Spouse/significant other   Assistance Needed independent in adls, shares iadls, spouse drives. new Eliquis   Type of Residence Private residence   Home or Post Acute Services None   Expected Discharge Disposition Home   Does the patient need discharge transport arranged? No     Pt confirms demo's ins and pcp. Pt will begin eliquis tx . Requested Tresa RN place order for rn navigator. Pd tomorrow. No other needs.

## 2024-07-19 ENCOUNTER — APPOINTMENT (OUTPATIENT)
Dept: CARDIOLOGY | Facility: HOSPITAL | Age: 79
End: 2024-07-19
Payer: COMMERCIAL

## 2024-07-19 ENCOUNTER — PHARMACY VISIT (OUTPATIENT)
Dept: PHARMACY | Facility: CLINIC | Age: 79
End: 2024-07-19
Payer: COMMERCIAL

## 2024-07-19 VITALS
RESPIRATION RATE: 18 BRPM | TEMPERATURE: 97.7 F | WEIGHT: 165.3 LBS | HEIGHT: 67 IN | OXYGEN SATURATION: 97 % | SYSTOLIC BLOOD PRESSURE: 128 MMHG | HEART RATE: 75 BPM | DIASTOLIC BLOOD PRESSURE: 65 MMHG | BODY MASS INDEX: 25.94 KG/M2

## 2024-07-19 LAB
ALBUMIN SERPL BCP-MCNC: 3.5 G/DL (ref 3.4–5)
ALP SERPL-CCNC: 52 U/L (ref 33–136)
ALT SERPL W P-5'-P-CCNC: 9 U/L (ref 10–52)
ANION GAP SERPL CALC-SCNC: 11 MMOL/L (ref 10–20)
AST SERPL W P-5'-P-CCNC: 11 U/L (ref 9–39)
ATRIAL RATE: 76 BPM
BASOPHILS # BLD AUTO: 0.04 X10*3/UL (ref 0–0.1)
BASOPHILS NFR BLD AUTO: 0.7 %
BILIRUB SERPL-MCNC: 0.5 MG/DL (ref 0–1.2)
BUN SERPL-MCNC: 11 MG/DL (ref 6–23)
CALCIUM SERPL-MCNC: 8.9 MG/DL (ref 8.6–10.3)
CHLORIDE SERPL-SCNC: 108 MMOL/L (ref 98–107)
CO2 SERPL-SCNC: 27 MMOL/L (ref 21–32)
CREAT SERPL-MCNC: 0.85 MG/DL (ref 0.5–1.3)
EGFRCR SERPLBLD CKD-EPI 2021: 88 ML/MIN/1.73M*2
EOSINOPHIL # BLD AUTO: 0.14 X10*3/UL (ref 0–0.4)
EOSINOPHIL NFR BLD AUTO: 2.6 %
ERYTHROCYTE [DISTWIDTH] IN BLOOD BY AUTOMATED COUNT: 14.4 % (ref 11.5–14.5)
GLUCOSE BLD MANUAL STRIP-MCNC: 110 MG/DL (ref 74–99)
GLUCOSE BLD MANUAL STRIP-MCNC: 114 MG/DL (ref 74–99)
GLUCOSE SERPL-MCNC: 127 MG/DL (ref 74–99)
HCT VFR BLD AUTO: 37.4 % (ref 41–52)
HGB BLD-MCNC: 12.6 G/DL (ref 13.5–17.5)
IMM GRANULOCYTES # BLD AUTO: 0.03 X10*3/UL (ref 0–0.5)
IMM GRANULOCYTES NFR BLD AUTO: 0.5 % (ref 0–0.9)
LYMPHOCYTES # BLD AUTO: 1.05 X10*3/UL (ref 0.8–3)
LYMPHOCYTES NFR BLD AUTO: 19.2 %
MCH RBC QN AUTO: 32.5 PG (ref 26–34)
MCHC RBC AUTO-ENTMCNC: 33.7 G/DL (ref 32–36)
MCV RBC AUTO: 96 FL (ref 80–100)
MONOCYTES # BLD AUTO: 0.63 X10*3/UL (ref 0.05–0.8)
MONOCYTES NFR BLD AUTO: 11.5 %
NEUTROPHILS # BLD AUTO: 3.57 X10*3/UL (ref 1.6–5.5)
NEUTROPHILS NFR BLD AUTO: 65.5 %
NRBC BLD-RTO: 0 /100 WBCS (ref 0–0)
P AXIS: -23 DEGREES
P OFFSET: 149 MS
P ONSET: 87 MS
PLATELET # BLD AUTO: 317 X10*3/UL (ref 150–450)
POTASSIUM SERPL-SCNC: 3.9 MMOL/L (ref 3.5–5.3)
PR INTERVAL: 264 MS
PROT SERPL-MCNC: 5.9 G/DL (ref 6.4–8.2)
Q ONSET: 219 MS
QRS COUNT: 13 BEATS
QRS DURATION: 96 MS
QT INTERVAL: 410 MS
QTC CALCULATION(BAZETT): 461 MS
QTC FREDERICIA: 443 MS
R AXIS: -19 DEGREES
RBC # BLD AUTO: 3.88 X10*6/UL (ref 4.5–5.9)
SODIUM SERPL-SCNC: 142 MMOL/L (ref 136–145)
T AXIS: 49 DEGREES
T OFFSET: 424 MS
VENTRICULAR RATE: 76 BPM
WBC # BLD AUTO: 5.5 X10*3/UL (ref 4.4–11.3)

## 2024-07-19 PROCEDURE — G0378 HOSPITAL OBSERVATION PER HR: HCPCS

## 2024-07-19 PROCEDURE — 2500000001 HC RX 250 WO HCPCS SELF ADMINISTERED DRUGS (ALT 637 FOR MEDICARE OP): Performed by: STUDENT IN AN ORGANIZED HEALTH CARE EDUCATION/TRAINING PROGRAM

## 2024-07-19 PROCEDURE — 82947 ASSAY GLUCOSE BLOOD QUANT: CPT | Mod: 59

## 2024-07-19 PROCEDURE — 99232 SBSQ HOSP IP/OBS MODERATE 35: CPT | Performed by: NURSE PRACTITIONER

## 2024-07-19 PROCEDURE — 93005 ELECTROCARDIOGRAM TRACING: CPT

## 2024-07-19 PROCEDURE — 85025 COMPLETE CBC W/AUTO DIFF WBC: CPT | Performed by: STUDENT IN AN ORGANIZED HEALTH CARE EDUCATION/TRAINING PROGRAM

## 2024-07-19 PROCEDURE — RXMED WILLOW AMBULATORY MEDICATION CHARGE

## 2024-07-19 PROCEDURE — 2500000002 HC RX 250 W HCPCS SELF ADMINISTERED DRUGS (ALT 637 FOR MEDICARE OP, ALT 636 FOR OP/ED): Performed by: INTERNAL MEDICINE

## 2024-07-19 PROCEDURE — 36415 COLL VENOUS BLD VENIPUNCTURE: CPT | Performed by: STUDENT IN AN ORGANIZED HEALTH CARE EDUCATION/TRAINING PROGRAM

## 2024-07-19 PROCEDURE — 2500000004 HC RX 250 GENERAL PHARMACY W/ HCPCS (ALT 636 FOR OP/ED): Performed by: STUDENT IN AN ORGANIZED HEALTH CARE EDUCATION/TRAINING PROGRAM

## 2024-07-19 PROCEDURE — 2500000001 HC RX 250 WO HCPCS SELF ADMINISTERED DRUGS (ALT 637 FOR MEDICARE OP): Performed by: NURSE PRACTITIONER

## 2024-07-19 PROCEDURE — 99239 HOSP IP/OBS DSCHRG MGMT >30: CPT | Performed by: INTERNAL MEDICINE

## 2024-07-19 PROCEDURE — 80053 COMPREHEN METABOLIC PANEL: CPT | Performed by: STUDENT IN AN ORGANIZED HEALTH CARE EDUCATION/TRAINING PROGRAM

## 2024-07-19 RX ORDER — ISOSORBIDE MONONITRATE 60 MG/1
60 TABLET, EXTENDED RELEASE ORAL DAILY
Qty: 30 TABLET | Refills: 11 | Status: SHIPPED | OUTPATIENT
Start: 2024-07-20 | End: 2025-07-20

## 2024-07-19 RX ORDER — POLYETHYLENE GLYCOL 3350 17 G/17G
17 POWDER, FOR SOLUTION ORAL DAILY
Qty: 30 PACKET | Refills: 0 | Status: SHIPPED | OUTPATIENT
Start: 2024-07-20 | End: 2024-08-19

## 2024-07-19 ASSESSMENT — PAIN - FUNCTIONAL ASSESSMENT: PAIN_FUNCTIONAL_ASSESSMENT: 0-10

## 2024-07-19 ASSESSMENT — COGNITIVE AND FUNCTIONAL STATUS - GENERAL
WALKING IN HOSPITAL ROOM: A LITTLE
MOVING TO AND FROM BED TO CHAIR: A LITTLE
MOBILITY SCORE: 20
STANDING UP FROM CHAIR USING ARMS: A LITTLE
CLIMB 3 TO 5 STEPS WITH RAILING: A LITTLE
DAILY ACTIVITIY SCORE: 24

## 2024-07-19 ASSESSMENT — PAIN SCALES - GENERAL: PAINLEVEL_OUTOF10: 0 - NO PAIN

## 2024-07-19 NOTE — PROGRESS NOTES
[facility] Progress Note           Rounding MARKO/Cardiologist:  Hernandez Arreola, APRN-CNP,     Primary Cardiologist: Dr. Jeanmarie Cooper    Date:  7/19/2024  Patient:  Lalo Antony  YOB: 1945  MRN:  [id number]   Admit Date:  7/17/2024      SUBJECTIVE:    7/19/24  Patient awake and alert  States feels much better today not having chest pain does want to go home. States will see Dr Cooper in August as scheduled. Answered all  the  questions.  Tele NSR occ PVC  EKG NSR with 1 degree AV block no acute changes       VITALS:     Vitals:    07/18/24 1936 07/19/24 0047 07/19/24 0810 07/19/24 0835   BP: 128/74 148/70 120/62 128/65   BP Location:       Patient Position:    Sitting   Pulse: 74 71  75   Resp: 16 18  18   Temp: 36.9 °C (98.4 °F) 36.2 °C (97.2 °F) 36.9 °C (98.4 °F) 36.5 °C (97.7 °F)   TempSrc:       SpO2: 97% 96% 97% 97%   Weight:       Height:           Intake/Output Summary (Last 24 hours) at 7/19/2024 1037  Last data filed at 7/18/2024 1453  Gross per 24 hour   Intake 56.2 ml   Output --   Net 56.2 ml       Wt Readings from Last 4 Encounters:   07/17/24 75 kg (165 lb 4.8 oz)   07/01/24 75.7 kg (166 lb 14.2 oz)   06/20/24 76.7 kg (169 lb)   06/04/24 80.7 kg (178 lb)       CURRENT HOSPITAL MEDICATIONS:   clonazePAM, 0.5 mg, oral, BID  clopidogrel, 75 mg, oral, Daily  ezetimibe, 10 mg, oral, Nightly  furosemide, 40 mg, oral, Daily  isosorbide mononitrate ER, 60 mg, oral, Daily  losartan, 50 mg, oral, Daily  magnesium oxide, 400 mg, oral, Daily  perflutren lipid microspheres, 0.5-10 mL of dilution, intravenous, Once in imaging  perflutren protein A microsphere, 0.5 mL, intravenous, Once in imaging  phenytoin ER, 100 mg, oral, TID  polyethylene glycol, 17 g, oral, Daily  rivaroxaban, 15 mg, oral, BID   Followed by  [START ON 8/9/2024] rivaroxaban, 20 mg, oral, Daily with breakfast  sennosides-docusate sodium, 2 tablet, oral, Nightly  simvastatin, 40 mg, oral, Nightly  sulfur  hexafluoride microsphr, 2 mL, intravenous, Once in imaging         Current Outpatient Medications   Medication Instructions    aspirin 81 mg, oral, Daily    clonazePAM (KLONOPIN) 0.5 mg, oral, 2 times daily    clopidogrel (PLAVIX) 75 mg, oral, Daily RT    dicyclomine (BENTYL) 20 mg, oral, 4 times daily PRN    Dilantin Extended 100 mg, oral, 3 times daily    ezetimibe (ZETIA) 10 mg, oral, Daily    furosemide (Lasix) 40 mg tablet TAKE 1 TABLET BY MOUTH ONCE DAILY    HYDROcodone-acetaminophen (Norco) 5-325 mg tablet 1 tablet, oral, Every 6 hours PRN    isosorbide mononitrate ER (IMDUR) 30 mg, oral, Daily    losartan (COZAAR) 50 mg, oral, Daily    magnesium oxide (Mag-Ox) 400 mg (241.3 mg magnesium) tablet 1 tablet, oral, Daily    nitroglycerin (Nitrostat) 0.4 mg SL tablet DISSOLVE 1 TABLET UNDER TONGUE EVERY 5 MINUTES FOR UP TO 3 DOSES AS NEEDED FOR CHEST PAIN. IF PAIN PERSISTS DIAL 911 OR GO TO ER.    simvastatin (Zocor) 40 mg tablet TAKE 1 TABLET BY MOUTH EVERY NIGHT AT BEDTIME        PHYSICAL EXAMINATION:   GENERAL:  Well developed, well nourished, in no acute distress.  CHEST:  Symmetric and nontender.  NEURO/PSYCH:  Alert and oriented times three with approppriate behavior and responses.  NECK:  Supple, no JVD, no bruit.  LUNGS:  clear  slightly  diminished in bases  HEART:  Rate and rhythm regular with no evident murmur, no gallop appreciated.        There are no rubs, clicks or heaves.  EXTREMITIES:  Warm with good color, no clubbing or cyanosis.  There is no edema noted.  PERIPHERAL VASCULAR:  Pulses present and equally palpable; 2+ throughout.      LAB DATA:     CBC:   Results from last 7 days   Lab Units 07/19/24  0556 07/18/24  0604 07/17/24  1246   WBC AUTO x10*3/uL 5.5 4.7 5.4   RBC AUTO x10*6/uL 3.88* 3.95* 4.11*   HEMOGLOBIN g/dL 12.6* 12.9* 13.6   HEMATOCRIT % 37.4* 37.7* 39.6*   MCV fL 96 95 96   MCH pg 32.5 32.7 33.1   MCHC g/dL 33.7 34.2 34.3   RDW % 14.4 14.3 14.2   PLATELETS AUTO x10*3/uL 317 325  375     CMP:    Results from last 7 days   Lab Units 07/19/24  0556 07/18/24  0604 07/17/24  1246   SODIUM mmol/L 142 142 140   POTASSIUM mmol/L 3.9 3.9 3.9   CHLORIDE mmol/L 108* 107 106   CO2 mmol/L 27 28 26   BUN mg/dL 11 8 9   CREATININE mg/dL 0.85 0.67 0.79   GLUCOSE mg/dL 127* 119* 146*   PROTEIN TOTAL g/dL 5.9* 6.1* 6.3*   CALCIUM mg/dL 8.9 8.9 9.3   BILIRUBIN TOTAL mg/dL 0.5 0.5 0.5   ALK PHOS U/L 52 57 61   AST U/L 11 13 15   ALT U/L 9* 11 11     BMP:    Results from last 7 days   Lab Units 07/19/24  0556 07/18/24  0604 07/17/24  1246   SODIUM mmol/L 142 142 140   POTASSIUM mmol/L 3.9 3.9 3.9   CHLORIDE mmol/L 108* 107 106   CO2 mmol/L 27 28 26   BUN mg/dL 11 8 9   CREATININE mg/dL 0.85 0.67 0.79   CALCIUM mg/dL 8.9 8.9 9.3   GLUCOSE mg/dL 127* 119* 146*     Magnesium:  Results from last 7 days   Lab Units 07/17/24  1246   MAGNESIUM mg/dL 2.04     Troponin:    Results from last 7 days   Lab Units 07/17/24  1554 07/17/24  1246   TROPHS ng/L 7 9     BNP:     Lipid Panel:         DIAGNOSTIC TESTING:   @No results found for this or any previous visit.    ECG 12 lead    Result Date: 7/18/2024  Sinus rhythm with 1st degree AV block Anterior infarct , age undetermined Abnormal ECG When compared with ECG of 13-APR-2024 19:51, No significant change was found See ED provider note for full interpretation and clinical correlation Confirmed by Мария Landeros ([id number]) on 7/18/2024 10:09:56 AM    ECG 12 lead    Result Date: 7/18/2024  Sinus rhythm with 1st degree AV block Anterior infarct (cited on or before 17-JUL-2024) Abnormal ECG When compared with ECG of 17-JUL-2024 12:38, (unconfirmed) No significant change was found See ED provider note for full interpretation and clinical correlation Confirmed by Мария Landeros ([id number]) on 7/18/2024 9:58:57 AM    ECG 12 Lead    Result Date: 7/18/2024  Sinus rhythm with 1st degree AV block Otherwise normal ECG When compared with ECG of 17-JUL-2024 13:54, (unconfirmed) No significant change  was found    Transthoracic Echo (TTE) Complete    Result Date: 7/18/2024          [place] [address]  Tel [phone] Fax [phone] TRANSTHORACIC ECHOCARDIOGRAM REPORT Patient Name:      CELE Man Physician:    [id number] Thomas Martin MD, [facility] Study Date:        7/18/2024            Ordering Provider:    [id number] SHERIDAN M RACHEL MRN/PID:           [id number]             Fellow: Accession#:        [id number]         Nurse: Date of Birth/Age: 1945 / 78      Sonographer:          Bernie guardado                                      [facility] Gender:            M                    Additional Staff: Height:            170.18 cm            Admit Date:           7/17/2024 Weight:            74.84 kg             Admission Status:     Inpatient -                                                               Routine BSA / BMI:         1.86 m2 / 25.84      Department Location:  [place] [address]                                      Echo Lab Blood Pressure: 137 /77 mmHg Study Type:    TRANSTHORACIC ECHO (TTE) COMPLETE Diagnosis/ICD: Other chest pain-R07.89 Indication:    CP, PE CPT Codes:     Echo Complete w Full [id number] Patient History: Smoker:            Former. Diabetes:          Yes Pertinent History: HTN, Hyperlipidemia, CAD, Chest Pain and Stents. Study Detail: The following Echo studies were performed: 2D, M-Mode, Doppler and               color flow. The patient was awake.  PHYSICIAN INTERPRETATION: Left Ventricle: The left ventricular systolic function is normal, with a visually estimated ejection fraction of 60-65%. There are no regional wall motion abnormalities. The left ventricular cavity size is normal. There is mild to moderate asymmetric left ventricular hypertrophy. Left ventricular diastolic filling was indeterminate. Patient with minimal LVH  but rather remarkable discrete upper septal thickening no significant outflow tract obstruction with maximal gradient of 1.2 m/s. Left Atrium: The left atrium is upper limits of normal in size. Right Ventricle: The right ventricle is normal in size. There is normal right ventricular global systolic function. RVSP not calculated as no significant TR Doppler envelope. Right Atrium: The right atrium is normal in size. Aortic Valve: The aortic valve is trileaflet. The aortic valve dimensionless index is 0.91. There is mild aortic valve regurgitation. The peak instantaneous gradient of the aortic valve is 8.5 mmHg. The mean gradient of the aortic valve is 5.0 mmHg. 1+ AI without evidence of aortic stenosis on a tricuspid valve. Mitral Valve: The mitral valve is normal in structure. There is no evidence of mitral valve regurgitation. Tricuspid Valve: The tricuspid valve is structurally normal. No evidence of tricuspid regurgitation. The right ventricular systolic pressure is unable to be estimated. Pulmonic Valve: The pulmonic valve is structurally normal. There is no indication of pulmonic valve regurgitation. Pericardium: There is no pericardial effusion noted. There is a pericardial fat pad present. There is very prominent fat pad particularly notable on the free wall RV. I think this less likely pericardial thickening. CT of the chest had shown no pericardial effusion and appearance unchanged from 2023. Aorta: The aortic root is abnormal. There is mild dilatation of the aortic root. Aortic root mildly dilated at 4 cm. No progression form 2023 when measured at 4.2.  CONCLUSIONS:  1. The left ventricular systolic function is normal, with a visually estimated ejection fraction of 60-65%.  2. Left ventricular diastolic filling was indeterminate.  3. There is mild to moderate asymmetric left ventricular hypertrophy.  4. RVSP not calculated as no significant TR Doppler envelope.  5. There is normal right ventricular global  systolic function.  6. There is very prominent fat pad particularly notable on the free wall RV. I think this less likely pericardial thickening. CT of the chest had shown no pericardial effusion and appearance unchanged from 2023.  7. 1+ AI without evidence of aortic stenosis on a tricuspid valve.  8. Mild aortic valve regurgitation.  9. Aortic root mildly dilated at 4 cm. No progression form 2023 when measured at 4.2. QUANTITATIVE DATA SUMMARY: 2D MEASUREMENTS:                          Normal Ranges: Ao Root d:     4.00 cm   (2.0-3.7cm) LAs:           3.79 cm   (2.7-4.0cm) IVSd:          0.80 cm   (0.6-1.1cm) LVPWd:         0.78 cm   (0.6-1.1cm) LVIDd:         4.44 cm   (3.9-5.9cm) LVIDs:         3.10 cm LV Mass Index: 58.4 g/m2 LV % FS        30.2 % LA VOLUME:                               Normal Ranges: LA Vol A4C:        36.8 ml    (22+/-6mL/m2) LA Vol A2C:        41.9 ml LA Vol BP:         40.7 ml LA Vol Index A4C:  19.8ml/m2 LA Vol Index A2C:  22.5 ml/m2 LA Vol Index BP:   21.8 ml/m2 LA Area A4C:       16.4 cm2 LA Area A2C:       16.9 cm2 LA Major Axis A4C: 6.2 cm LA Major Axis A2C: 5.8 cm LA Volume Index:   21.2 ml/m2 RA VOLUME BY A/L METHOD:                               Normal Ranges: RA Vol A4C:        34.7 ml    (8.3-19.5ml) RA Vol Index A4C:  18.6 ml/m2 RA Area A4C:       15.2 cm2 RA Major Axis A4C: 5.7 cm AORTA MEASUREMENTS:                    Normal Ranges: Asc Ao, d: 3.91 cm (2.1-3.4cm) LV SYSTOLIC FUNCTION BY 2D PLANIMETRY (MOD):                      Normal Ranges: EF-A4C View:    61 % (>=55%) EF-A2C View:    60 % EF-Biplane:     60 % EF-Visual:      63 % LV EF Reported: 63 % LV DIASTOLIC FUNCTION:                         Normal Ranges: MV Peak E:    1.16 m/s  (0.7-1.2 m/s) MV Peak A:    1.30 m/s  (0.42-0.7 m/s) E/A Ratio:    0.89      (1.0-2.2) MV e'         0.081 m/s (>8.0) MV lateral e' 0.08 m/s MV medial e'  0.08 m/s E/e' Ratio:   14.31     (<8.0) MITRAL VALVE:                 Normal Ranges: MV  DT: 188 msec (150-240msec) AORTIC VALVE:                                   Normal Ranges: AoV Vmax:                1.46 m/s (<=1.7m/s) AoV Peak P.5 mmHg (<20mmHg) AoV Mean P.0 mmHg (1.7-11.5mmHg) LVOT Max Delvis:            1.25 m/s (<=1.1m/s) AoV VTI:                 34.00 cm (18-25cm) LVOT VTI:                31.10 cm LVOT Diameter:           2.17 cm  (1.8-2.4cm) AoV Area, VTI:           3.38 cm2 (2.5-5.5cm2) AoV Area,Vmax:           3.17 cm2 (2.5-4.5cm2) AoV Dimensionless Index: 0.91 AORTIC INSUFFICIENCY: AI Vmax:       4.38 m/s AI Half-time:  317 msec AI Decel Rate: 371.00 cm/s2  RIGHT VENTRICLE: RV Basal 2.88 cm RV Mid   2.69 cm RV Major 7.7 cm TAPSE:   21.4 mm RV s'    0.12 m/s TRICUSPID VALVE/RVSP:                   Normal Ranges: IVC Diam: 1.29 cm PULMONIC VALVE:                         Normal Ranges: PV Accel Time: 119 msec (>120ms) PV Max Delvis:    0.7 m/s  (0.6-0.9m/s) PV Max P.0 mmHg  [id number] Thomas Martin MD, [facility] Electronically signed on 2024 at 8:56:25 AM  ** Final **     Lower extremity venous duplex bilateral    Result Date: 2024  Interpreted By:  Dora Escobar, STUDY: [facility] LOWER EXTREMITY VENOUS DUPLEX BILATERAL; 2024 6:03 pm   INDICATION: Signs/Symptoms:leg swelling.   COMPARISON: 2023   ACCESSION NUMBER(S): [id number]   ORDERING CLINICIAN: SHERIDAN RAMOS   TECHNIQUE: 2D grayscale and color-flow duplex images were obtained along with Doppler spectral waveform analysis of the deep venous system of the both lower extremities from the groin to the knee. Attempts were made to image the calf veins. Venous compression and augmentation were performed.   FINDINGS: Right Lower Extremity: There is normal compressibility and flow within the visualized vessels of the deep venous system of this lower extremity.   Left lower Extremity: There is normal compressibility and flow within the visualized vessels of the deep venous system of this lower extremity.          No sign of acute deep venous thrombosis in the lower extremities.     MACRO: none   Signed by: Dora Escobar 7/17/2024 7:02 PM Dictation workstation:   [id number]    CT angio chest for pulmonary embolism    Result Date: 7/17/2024  Interpreted By:  Satya Sanches, STUDY: CT ANGIO CHEST FOR PULMONARY EMBOLISM;  7/17/2024 3:09 pm   INDICATION: Signs/Symptoms:Pleuritic chest pain, recent surgery, elevated D-dimer.   COMPARISON: None.   ACCESSION NUMBER(S): [id number]   ORDERING CLINICIAN: YEIMY ALBRECHT   TECHNIQUE: Helical data acquisition of the chest was obtained with 75 mL Omnipaque 350. Images were reformatted in coronal and sagittal planes. Axial and coronal MIP images were created and reviewed.   FINDINGS: POTENTIAL LIMITATIONS OF THE STUDY: None   HEART AND VESSELS: No discrete filling defects within the main pulmonary artery or its branches.   Small eccentric filling defect about the right lower lobe at the proximal segmental division. No additional filling defects identified within the bilateral lungs.   The thoracic aorta is of normal course and caliber.   Dense coronary artery calcifications and/or stents.   The cardiac chambers are not enlarged.   No evidence of pericardial effusion.   MEDIASTINUM AND VICKI, LOWER NECK AND AXILLA: The visualized thyroid gland is within normal limits.   No evidence of thoracic lymphadenopathy by CT criteria.   Esophagus appears within normal limits as seen.   LUNGS AND AIRWAYS: The trachea and central airways are patent. No endobronchial lesion.   Small right pleural effusion.   UPPER ABDOMEN: The visualized subdiaphragmatic structures demonstrate no remarkable findings.   CHEST WALL AND OSSEOUS STRUCTURES: There are no suspicious osseous lesions. Multilevel degenerative changes are present       1. Small volume nonocclusive pulmonary embolus within the right lower lobe. 2. Small right pleural effusion.   MACRO: None   Signed by: Satya Sanches 7/17/2024 3:27 PM Dictation  workstation:   [id number]    XR chest 1 view    Result Date: 7/17/2024  Interpreted By:  Roe Graves, STUDY: XR CHEST 1 VIEW   INDICATION: Signs/Symptoms:Chest Pain.   COMPARISON: April 13, 2024   ACCESSION NUMBER(S): [id number]   ORDERING CLINICIAN: YEIMY ALBRECHT   FINDINGS: Interval development of small bilateral pleural effusions right-greater-than-left.   No gross consolidation. Cardiomegaly unchanged.       New small bilateral pleural effusions right slightly greater than left. No other acute findings.   Signed by: Roe Graves 7/17/2024 2:07 PM Dictation workstation:   [id number]       Transthoracic Echo (TTE) Complete   Final Result      Lower extremity venous duplex bilateral   Final Result   No sign of acute deep venous thrombosis in the lower extremities.             MACRO:   none        Signed by: Dora Escobar 7/17/2024 7:02 PM   Dictation workstation:   [id number]      CT angio chest for pulmonary embolism   Final Result   1. Small volume nonocclusive pulmonary embolus within the right lower   lobe.   2. Small right pleural effusion.        MACRO:   None        Signed by: Satya Sanches 7/17/2024 3:27 PM   Dictation workstation:   [id number]      XR chest 1 view   Final Result   New small bilateral pleural effusions right slightly greater than   left. No other acute findings.        Signed by: Roe Graves 7/17/2024 2:07 PM   Dictation workstation:   [id number]          Transthoracic Echo (TTE) Complete    Result Date: 7/18/2024          [place] [address]  Tel [phone] Fax [phone] TRANSTHORACIC ECHOCARDIOGRAM REPORT Patient Name:      CELE Man Physician:    [id number] Thomas Martin MD, [facility] Study Date:        7/18/2024            Ordering Provider:    [id number] SHERIDAN RAMOS MRN/PID:           [id number]             Fellow: Accession#:        [id number]         Nurse: Date of  Birth/Age: 1945 / 78      Sonographer:          Bernie guardado                                      [facility] Gender:            M                    Additional Staff: Height:            170.18 cm            Admit Date:           7/17/2024 Weight:            74.84 kg             Admission Status:     Inpatient -                                                               Routine BSA / BMI:         1.86 m2 / 25.84      Department Location:  [facility]                    kg/m2                                      Echo Lab Blood Pressure: 137 /77 mmHg Study Type:    TRANSTHORACIC ECHO (TTE) COMPLETE Diagnosis/ICD: Other chest pain-R07.89 Indication:    CP, PE CPT Codes:     Echo Complete w Full Doppler-[id number] Patient History: Smoker:            Former. Diabetes:          Yes Pertinent History: HTN, Hyperlipidemia, CAD, Chest Pain and Stents. Study Detail: The following Echo studies were performed: 2D, M-Mode, Doppler and               color flow. The patient was awake.  PHYSICIAN INTERPRETATION: Left Ventricle: The left ventricular systolic function is normal, with a visually estimated ejection fraction of 60-65%. There are no regional wall motion abnormalities. The left ventricular cavity size is normal. There is mild to moderate asymmetric left ventricular hypertrophy. Left ventricular diastolic filling was indeterminate. Patient with minimal LVH but rather remarkable discrete upper septal thickening no significant outflow tract obstruction with maximal gradient of 1.2 m/s. Left Atrium: The left atrium is upper limits of normal in size. Right Ventricle: The right ventricle is normal in size. There is normal right ventricular global systolic function. RVSP not calculated as no significant TR Doppler envelope. Right Atrium: The right atrium is normal in size. Aortic Valve: The aortic valve is trileaflet. The aortic valve dimensionless index is 0.91. There is mild aortic valve  regurgitation. The peak instantaneous gradient of the aortic valve is 8.5 mmHg. The mean gradient of the aortic valve is 5.0 mmHg. 1+ AI without evidence of aortic stenosis on a tricuspid valve. Mitral Valve: The mitral valve is normal in structure. There is no evidence of mitral valve regurgitation. Tricuspid Valve: The tricuspid valve is structurally normal. No evidence of tricuspid regurgitation. The right ventricular systolic pressure is unable to be estimated. Pulmonic Valve: The pulmonic valve is structurally normal. There is no indication of pulmonic valve regurgitation. Pericardium: There is no pericardial effusion noted. There is a pericardial fat pad present. There is very prominent fat pad particularly notable on the free wall RV. I think this less likely pericardial thickening. CT of the chest had shown no pericardial effusion and appearance unchanged from 2023. Aorta: The aortic root is abnormal. There is mild dilatation of the aortic root. Aortic root mildly dilated at 4 cm. No progression form 2023 when measured at 4.2.  CONCLUSIONS:  1. The left ventricular systolic function is normal, with a visually estimated ejection fraction of 60-65%.  2. Left ventricular diastolic filling was indeterminate.  3. There is mild to moderate asymmetric left ventricular hypertrophy.  4. RVSP not calculated as no significant TR Doppler envelope.  5. There is normal right ventricular global systolic function.  6. There is very prominent fat pad particularly notable on the free wall RV. I think this less likely pericardial thickening. CT of the chest had shown no pericardial effusion and appearance unchanged from 2023.  7. 1+ AI without evidence of aortic stenosis on a tricuspid valve.  8. Mild aortic valve regurgitation.  9. Aortic root mildly dilated at 4 cm. No progression form 2023 when measured at 4.2. QUANTITATIVE DATA SUMMARY: 2D MEASUREMENTS:                          Normal Ranges: Ao Root d:     4.00 cm    (2.0-3.7cm) LAs:           3.79 cm   (2.7-4.0cm) IVSd:          0.80 cm   (0.6-1.1cm) LVPWd:         0.78 cm   (0.6-1.1cm) LVIDd:         4.44 cm   (3.9-5.9cm) LVIDs:         3.10 cm LV Mass Index: 58.4 g/m2 LV % FS        30.2 % LA VOLUME:                               Normal Ranges: LA Vol A4C:        36.8 ml    (22+/-6mL/m2) LA Vol A2C:        41.9 ml LA Vol BP:         40.7 ml LA Vol Index A4C:  19.8ml/m2 LA Vol Index A2C:  22.5 ml/m2 LA Vol Index BP:   21.8 ml/m2 LA Area A4C:       16.4 cm2 LA Area A2C:       16.9 cm2 LA Major Axis A4C: 6.2 cm LA Major Axis A2C: 5.8 cm LA Volume Index:   21.2 ml/m2 RA VOLUME BY A/L METHOD:                               Normal Ranges: RA Vol A4C:        34.7 ml    (8.3-19.5ml) RA Vol Index A4C:  18.6 ml/m2 RA Area A4C:       15.2 cm2 RA Major Axis A4C: 5.7 cm AORTA MEASUREMENTS:                    Normal Ranges: Asc Ao, d: 3.91 cm (2.1-3.4cm) LV SYSTOLIC FUNCTION BY 2D PLANIMETRY (MOD):                      Normal Ranges: EF-A4C View:    61 % (>=55%) EF-A2C View:    60 % EF-Biplane:     60 % EF-Visual:      63 % LV EF Reported: 63 % LV DIASTOLIC FUNCTION:                         Normal Ranges: MV Peak E:    1.16 m/s  (0.7-1.2 m/s) MV Peak A:    1.30 m/s  (0.42-0.7 m/s) E/A Ratio:    0.89      (1.0-2.2) MV e'         0.081 m/s (>8.0) MV lateral e' 0.08 m/s MV medial e'  0.08 m/s E/e' Ratio:   14.31     (<8.0) MITRAL VALVE:                 Normal Ranges: MV DT: 188 msec (150-240msec) AORTIC VALVE:                                   Normal Ranges: AoV Vmax:                1.46 m/s (<=1.7m/s) AoV Peak P.5 mmHg (<20mmHg) AoV Mean P.0 mmHg (1.7-11.5mmHg) LVOT Max Delvis:            1.25 m/s (<=1.1m/s) AoV VTI:                 34.00 cm (18-25cm) LVOT VTI:                31.10 cm LVOT Diameter:           2.17 cm  (1.8-2.4cm) AoV Area, VTI:           3.38 cm2 (2.5-5.5cm2) AoV Area,Vmax:           3.17 cm2 (2.5-4.5cm2) AoV Dimensionless Index: 0.91 AORTIC  INSUFFICIENCY: AI Vmax:       4.38 m/s AI Half-time:  317 msec AI Decel Rate: 371.00 cm/s2  RIGHT VENTRICLE: RV Basal 2.88 cm RV Mid   2.69 cm RV Major 7.7 cm TAPSE:   21.4 mm RV s'    0.12 m/s TRICUSPID VALVE/RVSP:                   Normal Ranges: IVC Diam: 1.29 cm PULMONIC VALVE:                         Normal Ranges: PV Accel Time: 119 msec (>120ms) PV Max Delvis:    0.7 m/s  (0.6-0.9m/s) PV Max P.0 mmHg  [id number] Thomas Martin MD, [facility] Electronically signed on 2024 at 8:56:25 AM  ** Final **      RADIOLOGY:     Transthoracic Echo (TTE) Complete   Final Result      Lower extremity venous duplex bilateral   Final Result   No sign of acute deep venous thrombosis in the lower extremities.             MACRO:   none        Signed by: Dora Escobar 2024 7:02 PM   Dictation workstation:   [id number]      CT angio chest for pulmonary embolism   Final Result   1. Small volume nonocclusive pulmonary embolus within the right lower   lobe.   2. Small right pleural effusion.        MACRO:   None        Signed by: Satya Sanches 2024 3:27 PM   Dictation workstation:   [id number]      XR chest 1 view   Final Result   New small bilateral pleural effusions right slightly greater than   left. No other acute findings.        Signed by: Roe Graves 2024 2:07 PM   Dictation workstation:   [id number]          PROBLEM LIST     Patient Active Problem List   Diagnosis    CAD S/P percutaneous coronary angioplasty    Benign essential hypertension    Benign prostatic hyperplasia    Epilepsy (Multi)    Generalized anxiety disorder    GERD without esophagitis    Hyperlipidemia, mixed    Vitamin D deficiency    Former smoker    Type 2 diabetes mellitus with other circulatory complication, without long-term current use of insulin (Multi)    Diarrhea    Dizziness    Generalized abdominal pain    Headache    Malignant basal cell neoplasm of skin    Unexplained weight loss    Gallstones    Chest pain       ASSESSMENT:      Pulmonary embolism  Small right pleural effusion  Chest pain history of CAD  Hypertension  Dyslipidemia  Anxiety  Diabetes  History of seizures  Ef 60%    PLAN:     Okay to discharge from cardiac standpoint  Follow with Dr Cooper 8/13/24 as scheduled  Plavix 75 mg daily  Xarelto as prescribed. No asprin  Continue lasix, zetia,  imdur, cozaar, zocor  Carlos Arreola CNP  [facility]      Of note, this documentation is completed using the Dragon Dictation system (voice recognition software). There may be spelling and/or grammatical errors that were not corrected prior to final submission.    Please do not hesitate to call with questions.  Electronically signed by JHONATHAN Hunt, on 7/19/2024 at 10:37 AM

## 2024-07-19 NOTE — DISCHARGE SUMMARY
Discharge Diagnosis  Atypical chest pain.  Acute pulmonary embolism-noticed on CT chest.     History of:  Coronary artery disease s/p PCI.  Hypertension.  Hyperlipidemia.  Diabetes mellitus type 2.  Depression/anxiety.  Seizures-on phenytoin.      Discharge Meds     Your medication list        START taking these medications        Instructions Last Dose Given Next Dose Due   furosemide 40 mg tablet  Commonly known as: Lasix      TAKE 1 TABLET BY MOUTH ONCE DAILY       polyethylene glycol 17 gram packet  Commonly known as: Glycolax, Miralax  Start taking on: July 20, 2024      Take 17 g by mouth once daily.       rivaroxaban 15 mg (42)- 20 mg (9) tablets,dose pack  Commonly known as: XARELTO STARTER PACK  Start taking on: July 19, 2024      Take 15 mg by mouth 2 times a day for 21 days, THEN 20 mg once daily.              CHANGE how you take these medications        Instructions Last Dose Given Next Dose Due   Dilantin Extended 100 mg capsule  Generic drug: phenytoin ER  What changed: additional instructions      Take 1 capsule (100 mg) by mouth 3 times a day.       ezetimibe 10 mg tablet  Commonly known as: Zetia  What changed: when to take this      Take 1 tablet (10 mg) by mouth once daily.       isosorbide mononitrate ER 60 mg 24 hr tablet  Commonly known as: Imdur  Start taking on: July 20, 2024  What changed:   medication strength  how much to take  additional instructions      Take 1 tablet (60 mg) by mouth once daily. Do not crush or chew.              CONTINUE taking these medications        Instructions Last Dose Given Next Dose Due   clonazePAM 0.5 mg tablet  Commonly known as: KlonoPIN      Take 1 tablet (0.5 mg) by mouth 2 times a day.       clopidogrel 75 mg tablet  Commonly known as: Plavix      Take 1 tablet (75 mg) by mouth once daily.       dicyclomine 20 mg tablet  Commonly known as: Bentyl      Take 1 tablet (20 mg) by mouth 4 times a day as needed (abdominal pain or cramps).       losartan 50 mg  tablet  Commonly known as: Cozaar      Take 1 tablet (50 mg) by mouth once daily.       magnesium oxide 400 mg (241.3 mg magnesium) tablet  Commonly known as: Mag-Ox           nitroglycerin 0.4 mg SL tablet  Commonly known as: Nitrostat      DISSOLVE 1 TABLET UNDER TONGUE EVERY 5 MINUTES FOR UP TO 3 DOSES AS NEEDED FOR CHEST PAIN. IF PAIN PERSISTS DIAL 911 OR GO TO ER.       simvastatin 40 mg tablet  Commonly known as: Zocor      TAKE 1 TABLET BY MOUTH EVERY NIGHT AT BEDTIME              STOP taking these medications      aspirin 81 mg chewable tablet        HYDROcodone-acetaminophen 5-325 mg tablet  Commonly known as: Norco                  Where to Get Your Medications        These medications were sent to [facility]  [address], [place] [address]      Hours: 9 AM to 5:30 PM Mon-Fri, 9 AM to 1 PM Saturday Phone: [phone]   isosorbide mononitrate ER 60 mg 24 hr tablet  polyethylene glycol 17 gram packet  rivaroxaban 15 mg (42)- 20 mg (9) tablets,dose pack         Test Results Pending At Discharge  Pending Labs       No current pending labs.            Hospital Course  Currently patient is chest pain-free.  Echocardiogram results reviewed, showed ejection fraction of 60 to 35% with no wall motion abnormalities, no right heart strain's.  Cardiology evaluated the patient, recommended to discontinue aspirin and continue Plavix.  Also recommended to transition from heparin by weight to Eliquis.  Discontinued heparin by weight, started on Eliquis on 7/18.  Cardiology signed off.  Blood pressure running high 150s systolic-continuing Lasix 40, losartan 50, Imdur 60 daily, blood pressure fairly well-controlled with the current regimen.  Other Home medications reviewed and resumed appropriately.    Pertinent Physical Exam At Time of Discharge  Physical Exam  General: No distress  Neck: Supple.  HEENT: Normocephalic atraumatic pupils equal react light  Heart: S1-S2 heard no murmurs gallops  regurgitation  Lungs: Clear to auscultation bilaterally no wheezing rales or rhonchi  Abdomen: Nondistended nontender bowel sounds are present  Outpatient Follow-Up  Future Appointments   Date Time Provider Department Center   8/13/2024  8:30 AM Jeanmarie Cooper MD [id number] [place]   9/3/2024  9:15 AM Lennox Lopez MD [id number] [place]         Balbir Dumont MD

## 2024-07-19 NOTE — NURSING NOTE
Provided patient with Xarelto Starter Pack. Educated patient on dosing instructions and answered questions. Next fill through insurance is $75, Mónica placed a $10 copay card on patient's account for future fills

## 2024-07-22 ENCOUNTER — PATIENT OUTREACH (OUTPATIENT)
Dept: CARE COORDINATION | Facility: CLINIC | Age: 79
End: 2024-07-22
Payer: COMMERCIAL

## 2024-07-22 LAB
ATRIAL RATE: 69 BPM
ATRIAL RATE: 76 BPM
LABORATORY COMMENT REPORT: NORMAL
P AXIS: -23 DEGREES
P AXIS: 10 DEGREES
P OFFSET: 136 MS
P OFFSET: 149 MS
P ONSET: 79 MS
P ONSET: 87 MS
PATH REPORT.FINAL DX SPEC: NORMAL
PATH REPORT.GROSS SPEC: NORMAL
PATH REPORT.RELEVANT HX SPEC: NORMAL
PATH REPORT.TOTAL CANCER: NORMAL
PR INTERVAL: 264 MS
PR INTERVAL: 270 MS
Q ONSET: 214 MS
Q ONSET: 219 MS
QRS COUNT: 11 BEATS
QRS COUNT: 13 BEATS
QRS DURATION: 94 MS
QRS DURATION: 96 MS
QT INTERVAL: 410 MS
QT INTERVAL: 436 MS
QTC CALCULATION(BAZETT): 461 MS
QTC CALCULATION(BAZETT): 467 MS
QTC FREDERICIA: 443 MS
QTC FREDERICIA: 457 MS
R AXIS: -19 DEGREES
R AXIS: -19 DEGREES
T AXIS: 30 DEGREES
T AXIS: 49 DEGREES
T OFFSET: 424 MS
T OFFSET: 432 MS
VENTRICULAR RATE: 69 BPM
VENTRICULAR RATE: 76 BPM

## 2024-07-23 ENCOUNTER — APPOINTMENT (OUTPATIENT)
Dept: GASTROENTEROLOGY | Facility: CLINIC | Age: 79
End: 2024-07-23
Payer: COMMERCIAL

## 2024-07-24 ENCOUNTER — PATIENT OUTREACH (OUTPATIENT)
Dept: CARE COORDINATION | Facility: CLINIC | Age: 79
End: 2024-07-24
Payer: COMMERCIAL

## 2024-07-24 SDOH — ECONOMIC STABILITY: FOOD INSECURITY
ARE ANY OF YOUR NEEDS URGENT? FOR EXAMPLE, UNCERTAINTY OF WHERE YOU WILL GET YOUR NEXT MEAL OR NOT HAVING THE MEDICATIONS YOU NEED TO TAKE TOMORROW.: NO

## 2024-07-24 SDOH — ECONOMIC STABILITY: GENERAL: WOULD YOU LIKE HELP WITH ANY OF THE FOLLOWING NEEDS?: I DONT NEED HELP WITH ANY OF THESE

## 2024-07-24 NOTE — PROGRESS NOTES
Outreach call to patient to support a smooth transition of care from recent admission.    Discharge facility: [facility]  Discharge diagnosis: atypical chest pain, acute pulmonary embolism  Admission date: 7/17/24  Discharge date: 7/19/24    PCP Appointment Date:  9/3/2024   Specialist Appointment Date:  8/13/24 0830 Dr. Cooper (cardiology)      Spoke with patient, reviewed discharge medications, discharge instructions, assessed social needs, and provided education on importance of follow-up appointment with provider.     See  Hospital Encounter and Summary, and Discharge assessment below for further details. Information obtained from discharge summary from EMR.    Hospital Course  Currently patient is chest pain-free.  Echocardiogram results reviewed, showed ejection fraction of 60 to 35% with no wall motion abnormalities, no right heart strain's.  Cardiology evaluated the patient, recommended to discontinue aspirin and continue Plavix.  Also recommended to transition from heparin by weight to Eliquis.  Discontinued heparin by weight, started on Eliquis on 7/18.  Cardiology signed off.  Blood pressure running high 150s systolic-continuing Lasix 40, losartan 50, Imdur 60 daily, blood pressure fairly well-controlled with the current regimen.  Other Home medications reviewed and resumed appropriately.    Engagement  Call Start Time: 1413 (7/24/2024  2:23 PM)    Medications  Medications reviewed with patient/caregiver?: Yes (7/24/2024  2:23 PM)  Is the patient having any side effects they believe may be caused by any medication additions or changes?: No (7/24/2024  2:23 PM)  Does the patient have all medications ordered at discharge?: Yes (7/24/2024  2:23 PM)  Care Management Interventions: No intervention needed (7/24/2024  2:23 PM)  Is the patient taking all medications as directed (includes completed medication regime)?: Yes (7/24/2024  2:23 PM)  Care Management Interventions: Provided patient education (7/24/2024   "2:23 PM)  Medication Comments: Patient is taking Xarelto and furosemide as prescribed, without issue. Patient is currently not taking Miralax, \"no longer needs.\" (7/24/2024  2:23 PM)    Appointments  Does the patient have a primary care provider?: Yes (7/24/2024  2:23 PM)  Care Management Interventions: Verified appointment date/time/provider (7/24/2024  2:23 PM)  Has the patient kept scheduled appointments due by today?: Not applicable (7/24/2024  2:23 PM)    Patient Teaching  Does the patient have access to their discharge instructions?: Yes (7/24/2024  2:23 PM)  Care Management Interventions: Reviewed instructions with patient (7/24/2024  2:23 PM)  What is the patient's perception of their health status since discharge?: Improving (7/24/2024  2:23 PM)  Is the patient/caregiver able to teach back the hierarchy of who to call/visit for symptoms/problems? PCP, Specialist, Home Health nurse, Urgent Care, ED, 911: Yes (7/24/2024  2:23 PM)    Wrap Up  Wrap Up Additional Comments: Patient states he is still a little sore, s/p donna couple of weeks ago. Reports that his incisions are healed. Patient states that he has the occasional chest pain, but \"not too bad.\" Denies swelling. States breathing is \"ok,\" no difficulty noted. (7/24/2024  2:23 PM)  Call End Time: 1424 (7/24/2024  2:23 PM)       Enrolled patient in [facility] for additional support and education through transition period.  Will continue to monitor through transition period.    Provided patient with my contact information for potential needs.    Liya Calloway RN BSN  [facility] Care Manager  [phone]   "

## 2024-07-31 DIAGNOSIS — F41.1 GENERALIZED ANXIETY DISORDER: ICD-10-CM

## 2024-07-31 DIAGNOSIS — I26.99 PULMONARY EMBOLISM, OTHER, UNSPECIFIED CHRONICITY, UNSPECIFIED WHETHER ACUTE COR PULMONALE PRESENT (MULTI): ICD-10-CM

## 2024-07-31 PROCEDURE — RXMED WILLOW AMBULATORY MEDICATION CHARGE

## 2024-07-31 RX ORDER — CLONAZEPAM 0.5 MG/1
0.5 TABLET ORAL 2 TIMES DAILY
Qty: 60 TABLET | Refills: 0 | Status: SHIPPED | OUTPATIENT
Start: 2024-07-31 | End: 2024-09-29

## 2024-08-01 ENCOUNTER — PHARMACY VISIT (OUTPATIENT)
Dept: PHARMACY | Facility: CLINIC | Age: 79
End: 2024-08-01
Payer: COMMERCIAL

## 2024-08-06 ENCOUNTER — APPOINTMENT (OUTPATIENT)
Dept: PRIMARY CARE | Facility: CLINIC | Age: 79
End: 2024-08-06
Payer: COMMERCIAL

## 2024-08-07 DIAGNOSIS — F41.1 GENERALIZED ANXIETY DISORDER: ICD-10-CM

## 2024-08-07 RX ORDER — CLONAZEPAM 0.5 MG/1
0.5 TABLET ORAL 2 TIMES DAILY
Qty: 60 TABLET | Refills: 0 | Status: SHIPPED | OUTPATIENT
Start: 2024-08-07 | End: 2024-10-06

## 2024-08-08 ENCOUNTER — PHARMACY VISIT (OUTPATIENT)
Dept: PHARMACY | Facility: CLINIC | Age: 79
End: 2024-08-08
Payer: COMMERCIAL

## 2024-08-08 PROCEDURE — RXMED WILLOW AMBULATORY MEDICATION CHARGE

## 2024-08-13 ENCOUNTER — APPOINTMENT (OUTPATIENT)
Dept: CARDIOLOGY | Facility: CLINIC | Age: 79
End: 2024-08-13
Payer: COMMERCIAL

## 2024-08-19 ENCOUNTER — PHARMACY VISIT (OUTPATIENT)
Dept: PHARMACY | Facility: CLINIC | Age: 79
End: 2024-08-19
Payer: COMMERCIAL

## 2024-08-19 DIAGNOSIS — G40.909 NONINTRACTABLE EPILEPSY WITHOUT STATUS EPILEPTICUS, UNSPECIFIED EPILEPSY TYPE (MULTI): ICD-10-CM

## 2024-08-19 DIAGNOSIS — I10 BENIGN ESSENTIAL HYPERTENSION: ICD-10-CM

## 2024-08-19 PROCEDURE — RXMED WILLOW AMBULATORY MEDICATION CHARGE

## 2024-08-19 RX ORDER — LOSARTAN POTASSIUM 50 MG/1
50 TABLET ORAL DAILY
Qty: 90 TABLET | Refills: 1 | Status: SHIPPED | OUTPATIENT
Start: 2024-08-19 | End: 2024-08-22 | Stop reason: DRUGHIGH

## 2024-08-19 RX ORDER — PHENYTOIN SODIUM 100 MG/1
100 CAPSULE, EXTENDED RELEASE ORAL 3 TIMES DAILY
Qty: 270 CAPSULE | Refills: 1 | Status: SHIPPED | OUTPATIENT
Start: 2024-08-19

## 2024-08-20 ENCOUNTER — PHARMACY VISIT (OUTPATIENT)
Dept: PHARMACY | Facility: CLINIC | Age: 79
End: 2024-08-20

## 2024-08-22 ENCOUNTER — APPOINTMENT (OUTPATIENT)
Dept: PRIMARY CARE | Facility: CLINIC | Age: 79
End: 2024-08-22
Payer: COMMERCIAL

## 2024-08-22 ENCOUNTER — APPOINTMENT (OUTPATIENT)
Dept: LAB | Facility: LAB | Age: 79
End: 2024-08-22
Payer: COMMERCIAL

## 2024-08-22 VITALS
BODY MASS INDEX: 27.15 KG/M2 | HEIGHT: 67 IN | TEMPERATURE: 98.2 F | WEIGHT: 173 LBS | DIASTOLIC BLOOD PRESSURE: 56 MMHG | SYSTOLIC BLOOD PRESSURE: 90 MMHG | HEART RATE: 78 BPM

## 2024-08-22 DIAGNOSIS — I10 BENIGN ESSENTIAL HYPERTENSION: ICD-10-CM

## 2024-08-22 DIAGNOSIS — I26.93 SINGLE SUBSEGMENTAL PULMONARY EMBOLISM WITHOUT ACUTE COR PULMONALE (MULTI): Primary | ICD-10-CM

## 2024-08-22 DIAGNOSIS — Z79.899 DRUG THERAPY: ICD-10-CM

## 2024-08-22 DIAGNOSIS — Z98.61 CAD S/P PERCUTANEOUS CORONARY ANGIOPLASTY: ICD-10-CM

## 2024-08-22 DIAGNOSIS — I25.10 CAD S/P PERCUTANEOUS CORONARY ANGIOPLASTY: ICD-10-CM

## 2024-08-22 PROBLEM — R73.03 PREDIABETES: Status: ACTIVE | Noted: 2024-02-29

## 2024-08-22 PROCEDURE — 1036F TOBACCO NON-USER: CPT | Performed by: INTERNAL MEDICINE

## 2024-08-22 PROCEDURE — 99214 OFFICE O/P EST MOD 30 MIN: CPT | Performed by: INTERNAL MEDICINE

## 2024-08-22 PROCEDURE — 3078F DIAST BP <80 MM HG: CPT | Performed by: INTERNAL MEDICINE

## 2024-08-22 PROCEDURE — 1160F RVW MEDS BY RX/DR IN RCRD: CPT | Performed by: INTERNAL MEDICINE

## 2024-08-22 PROCEDURE — 3074F SYST BP LT 130 MM HG: CPT | Performed by: INTERNAL MEDICINE

## 2024-08-22 PROCEDURE — 1159F MED LIST DOCD IN RCRD: CPT | Performed by: INTERNAL MEDICINE

## 2024-08-22 RX ORDER — LOSARTAN POTASSIUM 25 MG/1
25 TABLET ORAL DAILY
Status: SHIPPED
Start: 2024-08-22

## 2024-08-22 ASSESSMENT — ENCOUNTER SYMPTOMS
MUSCULOSKELETAL NEGATIVE: 1
RESPIRATORY NEGATIVE: 1
GASTROINTESTINAL NEGATIVE: 1
WEAKNESS: 0
CARDIOVASCULAR NEGATIVE: 1
NERVOUS/ANXIOUS: 0
UNEXPECTED WEIGHT CHANGE: 0
ACTIVITY CHANGE: 0

## 2024-08-22 NOTE — PROGRESS NOTES
Subjective   Patient ID: Lalo Antony is a 78 y.o. male who presents for Follow-up (2 mo fu and hospital fu).  HPI  Heart Problem  This is a chronic problem. The current episode started more than 1 year ago. The problem occurs intermittently. The problem has been unchanged. Pertinent negatives include no abdominal pain, anorexia, arthralgias, change in bowel habit, chest pain, diaphoresis, fatigue, nausea, vomiting or weakness. The treatment provided significant relief.   Anxiety  Presents for follow-up visit. Patient reports no chest pain, confusion, decreased concentration, depressed mood, dizziness, dry mouth, excessive worry, nausea or shortness of breath. Symptoms occur occasionally. The quality of sleep is fair.   OARRS:  No data recorded  I have personally reviewed the OARRS report for Lalo Antony. I have considered the risks of abuse, dependence, addiction and diversion    Is the patient prescribed a combination of a benzodiazepine and opioid?  No    Last Urine Drug Screen / ordered today: Yes  No results found for this or any previous visit (from the past 8760 hour(s)).  Results are as expected.     Clinical rationale for not completing a Urine Drug Screen: ordered      Controlled Substance Agreement:  Date of the Last Agreement: 8/22/2024  Reviewed Controlled Substance Agreement including but not limited to the benefits, risks, and alternatives to treatment with a Controlled Substance medication(s).    Benzodiazepines:  What is the patient's goal of therapy? Less anxiety  Is this being achieved with current treatment? yes    CHARLETTE-7:  No data recorded    Activities of Daily Living:   Is your overall impression that this patient is benefiting (symptom reduction outweighs side effects) from benzodiazepine therapy? Yes     1. Physical Functioning: Same  2. Family Relationship: Same  3. Social Relationship: Same  4. Mood: Same  5. Sleep Patterns: Same  6. Overall Function: Same  Past Medical  History  Past Medical History:   Diagnosis Date    Epilepsy (Multi)     Generalized anxiety disorder        Social History  Social History     Tobacco Use    Smoking status: Former     Current packs/day: 0.00     Types: Cigarettes     Quit date: 1990     Years since quittin.6    Smokeless tobacco: Never   Vaping Use    Vaping status: Never Used   Substance Use Topics    Alcohol use: Not Currently    Drug use: Never       Family History     Family History   Problem Relation Name Age of Onset    Lung cancer Mother      Cancer Mother      Coronary artery disease Father      Heart attack Father      Skin cancer Son         Allergies:  Allergies   Allergen Reactions    Beta-Blockers (Beta-Adrenergic Blocking Agts) Unknown     DOES NOT REMEMBER    Rosuvastatin Rash and Swelling     severe muscle pain        Outpatient Medications:  Current Outpatient Medications   Medication Instructions    clonazePAM (KLONOPIN) 0.5 mg, oral, 2 times daily    clopidogrel (PLAVIX) 75 mg, oral, Daily RT    Dilantin Extended 100 mg, oral, 3 times daily    ezetimibe (ZETIA) 10 mg, oral, Daily    isosorbide mononitrate ER (IMDUR) 60 mg, oral, Daily, Do not crush or chew.    losartan (COZAAR) 25 mg, oral, Daily    magnesium oxide (Mag-Ox) 400 mg (241.3 mg magnesium) tablet 1 tablet, oral, Daily    nitroglycerin (Nitrostat) 0.4 mg SL tablet DISSOLVE 1 TABLET UNDER TONGUE EVERY 5 MINUTES FOR UP TO 3 DOSES AS NEEDED FOR CHEST PAIN. IF PAIN PERSISTS DIAL 911 OR GO TO ER.    simvastatin (Zocor) 40 mg tablet TAKE 1 TABLET BY MOUTH EVERY NIGHT AT BEDTIME    Xarelto 20 mg, oral, Daily, Take with food.        Review of Systems   Constitutional:  Negative for activity change and unexpected weight change.   Respiratory: Negative.     Cardiovascular: Negative.    Gastrointestinal: Negative.    Musculoskeletal: Negative.    Neurological:  Negative for weakness.   Psychiatric/Behavioral:  The patient is not nervous/anxious.          Objective  "      Physical Exam  Vitals reviewed.   Constitutional:       Appearance: Normal appearance. He is overweight.   HENT:      Head: Normocephalic.   Eyes:      Conjunctiva/sclera: Conjunctivae normal.   Cardiovascular:      Rate and Rhythm: Normal rate and regular rhythm.   Pulmonary:      Effort: Pulmonary effort is normal.      Breath sounds: Normal breath sounds.   Abdominal:      Palpations: Abdomen is soft.   Musculoskeletal:         General: Normal range of motion.      Cervical back: Neck supple.   Skin:     General: Skin is warm and dry.   Neurological:      General: No focal deficit present.   Psychiatric:         Mood and Affect: Mood normal.     BP 90/56 (BP Location: Left arm, Patient Position: Sitting, BP Cuff Size: Adult)   Pulse 78   Temp 36.8 °C (98.2 °F) (Temporal)   Ht 1.702 m (5' 7\")   Wt 78.5 kg (173 lb)   BMI 27.10 kg/m²      Assessment/Plan   Problem List Items Addressed This Visit       CAD S/P percutaneous coronary angioplasty    Benign essential hypertension    Relevant Medications    losartan (Cozaar) 25 mg tablet     Other Visit Diagnoses       Single subsegmental pulmonary embolism without acute cor pulmonale (Multi)    -  Primary        Recent hospitalization data and information reviewed, all reports, labs, radiological investigations and consultations and follow ups reviewed during this visit. Pt is doing well, precautions to be taken in the future for acute ailments or acute illness and change of condition are discussed, will continue to have close follow up, medication list was reviewed and updated and necessary changes were applied.   Having single segmental or subsegmental pulmonary embolism, we could surmise that that would be the reason for his chest pain at that time, it was a provoked pulmonary embolism because he had a cholecystectomy and he was inactive and sedentary.  Chronic cholecystitis was there, he gained some weight, we hope that chronic abdominal complaints were " because of chronic cholecystitis and he will start gaining weight, his BP was low today, he was not symptomatic, cut down losartan to 25 mg, discontinue Lasix, make sure that he has proper hydration, he needs urine drug screening particularly confirmation of benzodiazepines, clonazepam therapy is chronic treatment, he cannot live without clonazepam treatment he says.  Very anxious all this years.  No angina lately, nitrates has been added, he will be stopping Xarelto by end of January.  All other test and information from recent hospitalization and follow-up were reviewed, make sure that we have a periodic dermatological checkup because of history of melanoma, he was quite anxious and asked same questions repeatedly and I reassured him went through all the test and investigations, follow-up in 3 months.

## 2024-08-23 ENCOUNTER — PATIENT OUTREACH (OUTPATIENT)
Dept: CARE COORDINATION | Facility: CLINIC | Age: 79
End: 2024-08-23

## 2024-08-23 ENCOUNTER — APPOINTMENT (OUTPATIENT)
Dept: CARDIOLOGY | Facility: CLINIC | Age: 79
End: 2024-08-23
Payer: COMMERCIAL

## 2024-08-23 NOTE — PROGRESS NOTES
Outreach call to patient following up on appointment with primary care provider.  Discussed appointment, reviewed medications, and discussed plan of care. Denies chest pain. His PCP discontinued furosemide, patient denies edema. No issues reported with breathing. Patient is on Xarelto, denies signs and symptoms of bleeding. Patient endorses that he changed his losartan dose from 50mg to 25mg as prescribed. Patient with no additional needs noted. No additional outreach needed at this time.      Liya Calloway RN BSN  O Care Manager  [phone]

## 2024-08-27 ENCOUNTER — APPOINTMENT (OUTPATIENT)
Dept: CARDIOLOGY | Facility: CLINIC | Age: 79
End: 2024-08-27
Payer: COMMERCIAL

## 2024-09-03 ENCOUNTER — LAB (OUTPATIENT)
Dept: LAB | Facility: LAB | Age: 79
End: 2024-09-03
Payer: COMMERCIAL

## 2024-09-03 ENCOUNTER — OFFICE VISIT (OUTPATIENT)
Dept: CARDIOLOGY | Facility: CLINIC | Age: 79
End: 2024-09-03
Payer: COMMERCIAL

## 2024-09-03 ENCOUNTER — APPOINTMENT (OUTPATIENT)
Dept: PRIMARY CARE | Facility: CLINIC | Age: 79
End: 2024-09-03
Payer: COMMERCIAL

## 2024-09-03 VITALS
HEART RATE: 64 BPM | WEIGHT: 176.5 LBS | SYSTOLIC BLOOD PRESSURE: 100 MMHG | BODY MASS INDEX: 29.41 KG/M2 | HEIGHT: 65 IN | DIASTOLIC BLOOD PRESSURE: 62 MMHG

## 2024-09-03 DIAGNOSIS — R07.9 CHEST PAIN, UNSPECIFIED TYPE: ICD-10-CM

## 2024-09-03 DIAGNOSIS — Z87.891 FORMER SMOKER: ICD-10-CM

## 2024-09-03 DIAGNOSIS — I26.99 PULMONARY EMBOLISM, UNSPECIFIED CHRONICITY, UNSPECIFIED PULMONARY EMBOLISM TYPE, UNSPECIFIED WHETHER ACUTE COR PULMONALE PRESENT (MULTI): ICD-10-CM

## 2024-09-03 DIAGNOSIS — Z79.899 DRUG THERAPY: ICD-10-CM

## 2024-09-03 DIAGNOSIS — E78.2 HYPERLIPIDEMIA, MIXED: ICD-10-CM

## 2024-09-03 DIAGNOSIS — Z98.61 CAD S/P PERCUTANEOUS CORONARY ANGIOPLASTY: ICD-10-CM

## 2024-09-03 DIAGNOSIS — K21.9 GERD WITHOUT ESOPHAGITIS: ICD-10-CM

## 2024-09-03 DIAGNOSIS — I25.10 CAD S/P PERCUTANEOUS CORONARY ANGIOPLASTY: ICD-10-CM

## 2024-09-03 DIAGNOSIS — N40.0 BENIGN PROSTATIC HYPERPLASIA, UNSPECIFIED WHETHER LOWER URINARY TRACT SYMPTOMS PRESENT: ICD-10-CM

## 2024-09-03 DIAGNOSIS — I10 BENIGN ESSENTIAL HYPERTENSION: ICD-10-CM

## 2024-09-03 LAB
AMPHETAMINES UR QL SCN: NORMAL
BARBITURATES UR QL SCN: NORMAL
BENZODIAZ UR QL SCN: NORMAL
BZE UR QL SCN: NORMAL
CANNABINOIDS UR QL SCN: NORMAL
FENTANYL+NORFENTANYL UR QL SCN: NORMAL
METHADONE UR QL SCN: NORMAL
OPIATES UR QL SCN: NORMAL
OXYCODONE+OXYMORPHONE UR QL SCN: NORMAL
PCP UR QL SCN: NORMAL

## 2024-09-03 PROCEDURE — 80307 DRUG TEST PRSMV CHEM ANLYZR: CPT

## 2024-09-03 PROCEDURE — 99214 OFFICE O/P EST MOD 30 MIN: CPT | Performed by: INTERNAL MEDICINE

## 2024-09-03 PROCEDURE — RXMED WILLOW AMBULATORY MEDICATION CHARGE

## 2024-09-03 PROCEDURE — 1159F MED LIST DOCD IN RCRD: CPT | Performed by: INTERNAL MEDICINE

## 2024-09-03 PROCEDURE — 80346 BENZODIAZEPINES1-12: CPT

## 2024-09-03 PROCEDURE — 3074F SYST BP LT 130 MM HG: CPT | Performed by: INTERNAL MEDICINE

## 2024-09-03 PROCEDURE — 1036F TOBACCO NON-USER: CPT | Performed by: INTERNAL MEDICINE

## 2024-09-03 PROCEDURE — 3078F DIAST BP <80 MM HG: CPT | Performed by: INTERNAL MEDICINE

## 2024-09-03 RX ORDER — NITROGLYCERIN 0.4 MG/1
0.4 TABLET SUBLINGUAL EVERY 5 MIN PRN
Qty: 25 TABLET | Refills: 5 | Status: SHIPPED | OUTPATIENT
Start: 2024-09-03 | End: 2025-09-03

## 2024-09-03 RX ORDER — ISOSORBIDE MONONITRATE 60 MG/1
60 TABLET, EXTENDED RELEASE ORAL DAILY
Qty: 90 TABLET | Refills: 3 | Status: SHIPPED | OUTPATIENT
Start: 2024-09-03 | End: 2025-09-03

## 2024-09-03 RX ORDER — DICYCLOMINE HYDROCHLORIDE 20 MG/1
20 TABLET ORAL 4 TIMES DAILY PRN
COMMUNITY

## 2024-09-03 RX ORDER — HYDROCODONE BITARTRATE AND ACETAMINOPHEN 5; 325 MG/1; MG/1
1 TABLET ORAL EVERY 6 HOURS PRN
COMMUNITY

## 2024-09-03 NOTE — PATIENT INSTRUCTIONS
Follow up office visit in 6 months.    Continue same medications/treatment.  Patient educated on proper medication use.  Patient educated on risk factor modification.  Please bring any lab results from other providers / physicians to your next appointment.    Please bring all medicines, vitamins and herbal supplements with you when you come to the office.    Prescriptions will not be filled unless you are compliant with your follow up appointments or have a follow up  appointment scheduled as per instruction of your physician.  Refills should be requested at the time of  Your visit.    Kyra NO LPN, am scribing for and in the presence of  Dr. Jeanmarie Cooper MD, FACC

## 2024-09-03 NOTE — PROGRESS NOTES
Referred by Dr. Magdaleno ref. provider found provider found for   Chief Complaint   Patient presents with    Follow-up     9-10 month follow up        History of Present Illness  Lalo Antony is a 78 y.o. year old male patient is here for follow-up.  He was admitted to the hospital with appears to be bilateral pulmonary emboli treated with anticoagulation therapy.  He is doing well his shortness of breath is improved noted no actual chest pain.  He apparently has lost about 80 pounds due to gallbladder problems and ended up having surgery as well.  He is currently on Xarelto.  I discussed with the patient in length that we will continue anticoagulation for minimum of 6 months.  Will continue other medication will call for any problem and follow-up as scheduled    Past Medical History  Past Medical History:   Diagnosis Date    Epilepsy (Multi)     Generalized anxiety disorder        Social History  Social History     Tobacco Use    Smoking status: Former     Current packs/day: 0.00     Types: Cigarettes     Quit date: 1990     Years since quittin.6    Smokeless tobacco: Never   Vaping Use    Vaping status: Never Used   Substance Use Topics    Alcohol use: Not Currently    Drug use: Never       Family History     Family History   Problem Relation Name Age of Onset    Lung cancer Mother      Cancer Mother      Coronary artery disease Father      Heart attack Father      Skin cancer Son         Review of Systems  As per HPI, all other systems reviewed and negative.    Allergies:  Allergies   Allergen Reactions    Beta-Blockers (Beta-Adrenergic Blocking Agts) Unknown     DOES NOT REMEMBER    Rosuvastatin Rash and Swelling     severe muscle pain        Outpatient Medications:  Current Outpatient Medications   Medication Instructions    clonazePAM (KLONOPIN) 0.5 mg, oral, 2 times daily    clopidogrel (PLAVIX) 75 mg, oral, Daily RT    dicyclomine (BENTYL) 20 mg, oral, 4 times daily PRN    Dilantin Extended 100 mg,  oral, 3 times daily    ezetimibe (ZETIA) 10 mg, oral, Daily    HYDROcodone-acetaminophen (Norco) 5-325 mg tablet 1 tablet, oral, Every 6 hours PRN    isosorbide mononitrate ER (IMDUR) 60 mg, oral, Daily, Do not crush or chew.    losartan (COZAAR) 25 mg, oral, Daily    magnesium oxide (Mag-Ox) 400 mg (241.3 mg magnesium) tablet 1 tablet, oral, Daily    nitroglycerin (Nitrostat) 0.4 mg SL tablet DISSOLVE 1 TABLET UNDER TONGUE EVERY 5 MINUTES FOR UP TO 3 DOSES AS NEEDED FOR CHEST PAIN. IF PAIN PERSISTS DIAL 911 OR GO TO ER.    simvastatin (Zocor) 40 mg tablet TAKE 1 TABLET BY MOUTH EVERY NIGHT AT BEDTIME    Xarelto 20 mg, oral, Daily, Take with food.         Vitals:  Vitals:    09/03/24 1510   BP: 100/62   Pulse: 64       Physical Exam:  Physical Exam  Vitals and nursing note reviewed.   Constitutional:       Appearance: Normal appearance.   HENT:      Head: Normocephalic and atraumatic.   Eyes:      Extraocular Movements: Extraocular movements intact.      Pupils: Pupils are equal, round, and reactive to light.   Cardiovascular:      Rate and Rhythm: Normal rate and regular rhythm.      Pulses: Normal pulses.   Pulmonary:      Effort: Pulmonary effort is normal.      Breath sounds: Normal breath sounds.   Musculoskeletal:         General: Normal range of motion.      Cervical back: Normal range of motion.      Right lower leg: No edema.      Left lower leg: No edema.   Skin:     General: Skin is warm and dry.   Neurological:      General: No focal deficit present.      Mental Status: He is alert and oriented to person, place, and time.             Assessment/Plan   Diagnoses and all orders for this visit:  CAD S/P percutaneous coronary angioplasty  Hyperlipidemia, mixed  Benign essential hypertension  GERD without esophagitis  Benign prostatic hyperplasia, unspecified whether lower urinary tract symptoms present  Former smoker  Chest pain, unspecified type  Pulmonary embolism, unspecified chronicity, unspecified  pulmonary embolism type, unspecified whether acute cor pulmonale present (Multi)          Jeanmarie Cooper MD [facility]  Interventional Cardiology   of [facility]     Thank you for allowing me to participate in the care of this patient. Please do not hesitate to contact me with any further questions or concerns.

## 2024-09-04 ENCOUNTER — PHARMACY VISIT (OUTPATIENT)
Dept: PHARMACY | Facility: CLINIC | Age: 79
End: 2024-09-04
Payer: COMMERCIAL

## 2024-09-06 DIAGNOSIS — F41.1 GENERALIZED ANXIETY DISORDER: ICD-10-CM

## 2024-09-06 PROCEDURE — RXMED WILLOW AMBULATORY MEDICATION CHARGE

## 2024-09-06 RX ORDER — CLONAZEPAM 0.5 MG/1
0.5 TABLET ORAL 2 TIMES DAILY
Qty: 60 TABLET | Refills: 0 | Status: SHIPPED | OUTPATIENT
Start: 2024-09-06 | End: 2024-11-05

## 2024-09-09 ENCOUNTER — PHARMACY VISIT (OUTPATIENT)
Dept: PHARMACY | Facility: CLINIC | Age: 79
End: 2024-09-09
Payer: COMMERCIAL

## 2024-09-09 DIAGNOSIS — F41.1 GENERALIZED ANXIETY DISORDER: ICD-10-CM

## 2024-09-09 PROCEDURE — RXMED WILLOW AMBULATORY MEDICATION CHARGE

## 2024-09-09 RX ORDER — CLONAZEPAM 0.5 MG/1
0.5 TABLET ORAL 2 TIMES DAILY
Qty: 60 TABLET | Refills: 0 | Status: SHIPPED | OUTPATIENT
Start: 2024-09-09 | End: 2024-11-08

## 2024-09-11 LAB
1OH-MIDAZOLAM UR CFM-MCNC: <25 NG/ML
7AMINOCLONAZEPAM UR CFM-MCNC: 465 NG/ML
A-OH ALPRAZ UR CFM-MCNC: <25 NG/ML
ALPRAZ UR CFM-MCNC: <25 NG/ML
CHLORDIAZEP UR CFM-MCNC: <25 NG/ML
CLONAZEPAM UR CFM-MCNC: <25 NG/ML
DIAZEPAM UR CFM-MCNC: <25 NG/ML
LORAZEPAM UR CFM-MCNC: <25 NG/ML
MIDAZOLAM UR CFM-MCNC: <25 NG/ML
NORDIAZEPAM UR CFM-MCNC: <25 NG/ML
OXAZEPAM UR CFM-MCNC: <25 NG/ML
TEMAZEPAM UR CFM-MCNC: <25 NG/ML

## 2024-09-16 ENCOUNTER — APPOINTMENT (OUTPATIENT)
Dept: PRIMARY CARE | Facility: CLINIC | Age: 79
End: 2024-09-16
Payer: COMMERCIAL

## 2024-09-18 ENCOUNTER — LAB REQUISITION (OUTPATIENT)
Dept: LAB | Facility: HOSPITAL | Age: 79
End: 2024-09-18
Payer: COMMERCIAL

## 2024-09-18 ENCOUNTER — HOSPITAL ENCOUNTER (EMERGENCY)
Facility: HOSPITAL | Age: 79
Discharge: HOME | End: 2024-09-18
Payer: COMMERCIAL

## 2024-09-18 ENCOUNTER — APPOINTMENT (OUTPATIENT)
Dept: RADIOLOGY | Facility: HOSPITAL | Age: 79
End: 2024-09-18
Payer: COMMERCIAL

## 2024-09-18 VITALS
HEART RATE: 66 BPM | TEMPERATURE: 97.5 F | HEIGHT: 67 IN | SYSTOLIC BLOOD PRESSURE: 139 MMHG | WEIGHT: 177 LBS | DIASTOLIC BLOOD PRESSURE: 73 MMHG | RESPIRATION RATE: 16 BRPM | OXYGEN SATURATION: 100 % | BODY MASS INDEX: 27.78 KG/M2

## 2024-09-18 DIAGNOSIS — L53.8 OTHER SPECIFIED ERYTHEMATOUS CONDITIONS: ICD-10-CM

## 2024-09-18 DIAGNOSIS — L29.89 OTHER PRURITUS: ICD-10-CM

## 2024-09-18 DIAGNOSIS — R58 HEMORRHAGE, NOT ELSEWHERE CLASSIFIED: ICD-10-CM

## 2024-09-18 DIAGNOSIS — D22.39 MELANOCYTIC NEVI OF OTHER PARTS OF FACE: ICD-10-CM

## 2024-09-18 DIAGNOSIS — C44.329 SQUAMOUS CELL CARCINOMA OF SKIN OF OTHER PARTS OF FACE: ICD-10-CM

## 2024-09-18 DIAGNOSIS — S01.00XA OPEN WOUND OF SCALP, UNSPECIFIED OPEN WOUND TYPE, INITIAL ENCOUNTER: Primary | ICD-10-CM

## 2024-09-18 PROCEDURE — 88305 TISSUE EXAM BY PATHOLOGIST: CPT

## 2024-09-18 PROCEDURE — 88305 TISSUE EXAM BY PATHOLOGIST: CPT | Performed by: PATHOLOGY

## 2024-09-18 PROCEDURE — 70250 X-RAY EXAM OF SKULL: CPT | Mod: FOREIGN READ | Performed by: RADIOLOGY

## 2024-09-18 PROCEDURE — 70250 X-RAY EXAM OF SKULL: CPT

## 2024-09-18 PROCEDURE — 99283 EMERGENCY DEPT VISIT LOW MDM: CPT

## 2024-09-18 PROCEDURE — 70260 X-RAY EXAM OF SKULL: CPT

## 2024-09-18 ASSESSMENT — PAIN SCALES - GENERAL
PAINLEVEL_OUTOF10: 0 - NO PAIN
PAINLEVEL_OUTOF10: 0 - NO PAIN

## 2024-09-18 ASSESSMENT — LIFESTYLE VARIABLES
HAVE PEOPLE ANNOYED YOU BY CRITICIZING YOUR DRINKING: NO
TOTAL SCORE: 0
HAVE YOU EVER FELT YOU SHOULD CUT DOWN ON YOUR DRINKING: NO
EVER HAD A DRINK FIRST THING IN THE MORNING TO STEADY YOUR NERVES TO GET RID OF A HANGOVER: NO
EVER FELT BAD OR GUILTY ABOUT YOUR DRINKING: NO

## 2024-09-18 ASSESSMENT — PAIN - FUNCTIONAL ASSESSMENT: PAIN_FUNCTIONAL_ASSESSMENT: 0-10

## 2024-09-18 NOTE — ED PROVIDER NOTES
HPI   Chief Complaint   Patient presents with    Post-op Problem     Pt accidentally rubbed off post op biopsy scab on forehead        A 78-year-old male patient comes in the emergency department today with complaints of bleeding from his scalp.  States last week he had a biopsy taken of his skin.  States has been doing well this morning he excellently scab off on his pillow and it started bleeding immediately.  States he is on Xarelto.  For this purpose comes in the emergency department today by EMS.  Otherwise no other complaints present time.              Patient History   Past Medical History:   Diagnosis Date    Epilepsy (Multi)     Generalized anxiety disorder      Past Surgical History:   Procedure Laterality Date    CHOLECYSTECTOMY      OTHER SURGICAL HISTORY  2019    Inguinal hernia repair    OTHER SURGICAL HISTORY  10/27/2021    Percutaneous transluminal coronary angioplasty    OTHER SURGICAL HISTORY  10/27/2021    Skin lesion excision    OTHER SURGICAL HISTORY  2020    Excision of basal cell carcinoma    OTHER SURGICAL HISTORY  2020    Tooth extraction    OTHER SURGICAL HISTORY  2020    Colonoscopy complete for polypectomy    OTHER SURGICAL HISTORY  2020    Stomach biopsy    OTHER SURGICAL HISTORY  2020    Esophagogastroduodenoscopy    OTHER SURGICAL HISTORY  2020    Cardiac catheterization with stent placement     Family History   Problem Relation Name Age of Onset    Lung cancer Mother      Cancer Mother      Coronary artery disease Father      Heart attack Father      Skin cancer Son       Social History     Tobacco Use    Smoking status: Former     Current packs/day: 0.00     Types: Cigarettes     Quit date: 1990     Years since quittin.7    Smokeless tobacco: Never   Vaping Use    Vaping status: Never Used   Substance Use Topics    Alcohol use: Not Currently    Drug use: Never       Physical Exam   ED Triage Vitals [24 0742]   Temperature Heart  Rate Respirations BP   36.4 °C (97.5 °F) 70 18 155/81      Pulse Ox Temp Source Heart Rate Source Patient Position   96 % Temporal Monitor Lying      BP Location FiO2 (%)     Left arm --       Physical Exam  Constitutional:       General: He is not in acute distress.     Appearance: Normal appearance. He is not ill-appearing or diaphoretic.   HENT:      Head: Normocephalic and atraumatic.      Comments: There is a superficial circular area on patient's scalp that is oozing blood from prior biopsy site     Nose: Nose normal.   Eyes:      Extraocular Movements: Extraocular movements intact.      Conjunctiva/sclera: Conjunctivae normal.      Pupils: Pupils are equal, round, and reactive to light.   Cardiovascular:      Rate and Rhythm: Normal rate and regular rhythm.   Pulmonary:      Effort: Pulmonary effort is normal. No respiratory distress.      Breath sounds: Normal breath sounds. No stridor. No wheezing.   Musculoskeletal:         General: Normal range of motion.      Cervical back: Normal range of motion.   Skin:     General: Skin is warm and dry.   Neurological:      General: No focal deficit present.      Mental Status: He is alert and oriented to person, place, and time. Mental status is at baseline.   Psychiatric:         Mood and Affect: Mood normal.           ED Course & MDM   Diagnoses as of 09/18/24 1052   Open wound of scalp, unspecified open wound type, initial encounter                 No data recorded     [place] Coma Scale Score: 15 (09/18/24 0744 : Areli Rajput RN)                           Medical Decision Making  A 78-year-old male patient comes in the emergency department today with complaints of bleeding from his scalp.  States last week he had a biopsy taken of his skin.  States has been doing well this morning he excellently scab off on his pillow and it started bleeding immediately.  States he is on Xarelto.  For this purpose comes in the emergency department today by EMS.  Otherwise no  other complaints present time.    Surgicel placed on patient's scalp and area of bleeding.  This is followed by 4 x 4's and a wrap placed on patient's head.  Will observe patient for period of time.    Patient feels like there might be a metal shaving chip in his scalp.  He is requesting an x-ray.  Will order this.    X-ray shows no foreign bodies in the scalp.  Patient no longer has any bleeding.  Will discharge patient home.  Patient agrees with this plan and expressed verbal understanding.  Questions answered.    Historian is the patient    Diagnosis: Scalp hemorrhage    Labs Reviewed - No data to display     XR skull 1-3 views   Final Result   No radiopaque foreign body projects over the orbits or within the   scalp.   Signed by Forrest Cruz MD            Procedure  Procedures     Perico Verma PA-C  09/18/24 2644

## 2024-09-24 LAB
LABORATORY COMMENT REPORT: NORMAL
PATH REPORT.FINAL DX SPEC: NORMAL
PATH REPORT.GROSS SPEC: NORMAL
PATH REPORT.RELEVANT HX SPEC: NORMAL
PATH REPORT.TOTAL CANCER: NORMAL

## 2024-10-08 DIAGNOSIS — F41.1 GENERALIZED ANXIETY DISORDER: ICD-10-CM

## 2024-10-08 PROCEDURE — RXMED WILLOW AMBULATORY MEDICATION CHARGE

## 2024-10-08 RX ORDER — CLONAZEPAM 0.5 MG/1
0.5 TABLET ORAL 2 TIMES DAILY
Qty: 60 TABLET | Refills: 0 | Status: SHIPPED | OUTPATIENT
Start: 2024-10-08 | End: 2024-12-07

## 2024-10-09 ENCOUNTER — PHARMACY VISIT (OUTPATIENT)
Dept: PHARMACY | Facility: CLINIC | Age: 79
End: 2024-10-09
Payer: COMMERCIAL

## 2024-10-25 DIAGNOSIS — I10 BENIGN ESSENTIAL HYPERTENSION: ICD-10-CM

## 2024-10-25 RX ORDER — FUROSEMIDE 40 MG/1
40 TABLET ORAL DAILY
Qty: 90 TABLET | Refills: 3 | Status: SHIPPED | OUTPATIENT
Start: 2024-10-25 | End: 2025-10-25

## 2024-11-07 ENCOUNTER — PHARMACY VISIT (OUTPATIENT)
Dept: PHARMACY | Facility: CLINIC | Age: 79
End: 2024-11-07
Payer: COMMERCIAL

## 2024-11-07 DIAGNOSIS — F41.1 GENERALIZED ANXIETY DISORDER: ICD-10-CM

## 2024-11-07 PROCEDURE — RXMED WILLOW AMBULATORY MEDICATION CHARGE

## 2024-11-07 RX ORDER — CLONAZEPAM 0.5 MG/1
0.5 TABLET ORAL 2 TIMES DAILY
Qty: 60 TABLET | Refills: 1 | Status: SHIPPED | OUTPATIENT
Start: 2024-11-07 | End: 2025-01-06

## 2024-11-11 ENCOUNTER — APPOINTMENT (OUTPATIENT)
Dept: PRIMARY CARE | Facility: CLINIC | Age: 79
End: 2024-11-11
Payer: COMMERCIAL

## 2024-11-18 ENCOUNTER — APPOINTMENT (OUTPATIENT)
Dept: PRIMARY CARE | Facility: CLINIC | Age: 79
End: 2024-11-18
Payer: COMMERCIAL

## 2024-11-18 VITALS
WEIGHT: 186 LBS | SYSTOLIC BLOOD PRESSURE: 122 MMHG | BODY MASS INDEX: 29.19 KG/M2 | HEART RATE: 67 BPM | DIASTOLIC BLOOD PRESSURE: 80 MMHG | HEIGHT: 67 IN | TEMPERATURE: 97.5 F

## 2024-11-18 DIAGNOSIS — Z12.5 PROSTATE CANCER SCREENING: ICD-10-CM

## 2024-11-18 DIAGNOSIS — F41.1 GENERALIZED ANXIETY DISORDER: ICD-10-CM

## 2024-11-18 DIAGNOSIS — I10 BENIGN ESSENTIAL HYPERTENSION: ICD-10-CM

## 2024-11-18 DIAGNOSIS — Z98.61 CAD S/P PERCUTANEOUS CORONARY ANGIOPLASTY: ICD-10-CM

## 2024-11-18 DIAGNOSIS — Z98.61 CAD S/P PERCUTANEOUS CORONARY ANGIOPLASTY: Primary | ICD-10-CM

## 2024-11-18 DIAGNOSIS — I26.99 PULMONARY EMBOLISM, UNSPECIFIED CHRONICITY, UNSPECIFIED PULMONARY EMBOLISM TYPE, UNSPECIFIED WHETHER ACUTE COR PULMONALE PRESENT (MULTI): ICD-10-CM

## 2024-11-18 DIAGNOSIS — I25.10 CAD S/P PERCUTANEOUS CORONARY ANGIOPLASTY: ICD-10-CM

## 2024-11-18 DIAGNOSIS — G40.909 NONINTRACTABLE EPILEPSY WITHOUT STATUS EPILEPTICUS, UNSPECIFIED EPILEPSY TYPE (MULTI): ICD-10-CM

## 2024-11-18 DIAGNOSIS — I25.10 CAD S/P PERCUTANEOUS CORONARY ANGIOPLASTY: Primary | ICD-10-CM

## 2024-11-18 PROCEDURE — 99213 OFFICE O/P EST LOW 20 MIN: CPT | Performed by: INTERNAL MEDICINE

## 2024-11-18 PROCEDURE — 1036F TOBACCO NON-USER: CPT | Performed by: INTERNAL MEDICINE

## 2024-11-18 PROCEDURE — 1159F MED LIST DOCD IN RCRD: CPT | Performed by: INTERNAL MEDICINE

## 2024-11-18 PROCEDURE — 3079F DIAST BP 80-89 MM HG: CPT | Performed by: INTERNAL MEDICINE

## 2024-11-18 PROCEDURE — RXMED WILLOW AMBULATORY MEDICATION CHARGE

## 2024-11-18 PROCEDURE — 3074F SYST BP LT 130 MM HG: CPT | Performed by: INTERNAL MEDICINE

## 2024-11-18 PROCEDURE — 1160F RVW MEDS BY RX/DR IN RCRD: CPT | Performed by: INTERNAL MEDICINE

## 2024-11-18 RX ORDER — LOSARTAN POTASSIUM 25 MG/1
25 TABLET ORAL DAILY
Qty: 90 TABLET | Refills: 3 | Status: SHIPPED | OUTPATIENT
Start: 2024-11-18 | End: 2025-11-18

## 2024-11-18 RX ORDER — CLOPIDOGREL BISULFATE 75 MG/1
75 TABLET ORAL
Qty: 90 TABLET | Refills: 3 | Status: SHIPPED | OUTPATIENT
Start: 2024-11-18 | End: 2025-11-18

## 2024-11-18 ASSESSMENT — ENCOUNTER SYMPTOMS
LOSS OF SENSATION IN FEET: 0
CONSTITUTIONAL NEGATIVE: 1
NAUSEA: 0
DEPRESSION: 0
RESPIRATORY NEGATIVE: 1
CARDIOVASCULAR NEGATIVE: 1
DIARRHEA: 0
NEUROLOGICAL NEGATIVE: 1
ABDOMINAL PAIN: 0
ARTHRALGIAS: 1
OCCASIONAL FEELINGS OF UNSTEADINESS: 1
WOUND: 1

## 2024-11-18 ASSESSMENT — PATIENT HEALTH QUESTIONNAIRE - PHQ9
1. LITTLE INTEREST OR PLEASURE IN DOING THINGS: NOT AT ALL
SUM OF ALL RESPONSES TO PHQ9 QUESTIONS 1 AND 2: 0
2. FEELING DOWN, DEPRESSED OR HOPELESS: NOT AT ALL

## 2024-11-18 NOTE — TELEPHONE ENCOUNTER
Received request for prescription refill for patient.  Patient follows with Dr. Jeanmarie Cooper MD, [facility]     Request is for Plavix   Is patient currently on medication- yes    Last OV- 9/3/24  Next OV- 3/4/25    Pended for signing and sent to provider.

## 2024-11-18 NOTE — PROGRESS NOTES
Subjective   Patient ID: Lalo Antony is a 79 y.o. male who presents for Follow-up (3 mo fu).  HPI  Heart Problem  This is a chronic problem. The current episode started more than 1 year ago. The problem occurs intermittently. The problem has been unchanged. Pertinent negatives include no abdominal pain, anorexia, arthralgias, change in bowel habit, chest pain, diaphoresis, fatigue, nausea, vomiting or weakness. The treatment provided significant relief.   Anxiety  Presents for follow-up visit. Patient reports no chest pain, confusion, decreased concentration, depressed mood, dizziness, dry mouth, excessive worry, nausea or shortness of breath. Symptoms occur occasionally. The quality of sleep is fair.   OARRS:  No data recorded  I have personally reviewed the OARRS report for Lalo Antony. I have considered the risks of abuse, dependence, addiction and diversion    Is the patient prescribed a combination of a benzodiazepine and opioid?  No    Last Urine Drug Screen / ordered today: No  Recent Results (from the past 8760 hours)   Benzodiazepine Confirmation, Urine    Collection Time: 09/03/24 11:09 AM   Result Value Ref Range    Clonazepam <25 <25 ng/mL    7-Aminoclonazepam 465 (H) <25 ng/mL    Alprazolam <25 <25 ng/mL    Alpha-Hydroxyalprazolam <25 <25 ng/mL    Midazolam <25 <25 ng/mL    Alpha-Hydroxymidazolam <25 <25 ng/mL    Chlordiazepoxide <25 <25 ng/mL    Diazepam <25 <25 ng/mL    Nordiazepam <25 <25 ng/mL    Temazepam <25 <25 ng/mL    Oxazepam <25 <25 ng/mL    Lorazepam <25 <25 ng/mL   Drug Screen, Urine With Reflex to Confirmation    Collection Time: 09/03/24 11:09 AM   Result Value Ref Range    Amphetamine Screen, Urine Presumptive Negative Presumptive Negative    Barbiturate Screen, Urine Presumptive Negative Presumptive Negative    Benzodiazepines Screen, Urine Presumptive Negative Presumptive Negative    Cannabinoid Screen, Urine Presumptive Negative Presumptive Negative    Cocaine Metabolite  Screen, Urine Presumptive Negative Presumptive Negative    Fentanyl Screen, Urine Presumptive Negative Presumptive Negative    Opiate Screen, Urine Presumptive Negative Presumptive Negative    Oxycodone Screen, Urine Presumptive Negative Presumptive Negative    PCP Screen, Urine Presumptive Negative Presumptive Negative    Methadone Screen, Urine Presumptive Negative Presumptive Negative     Results are as expected.         Controlled Substance Agreement:  Date of the Last Agreement: 2024  Reviewed Controlled Substance Agreement including but not limited to the benefits, risks, and alternatives to treatment with a Controlled Substance medication(s).    Benzodiazepines:  What is the patient's goal of therapy? Less anxiety  Is this being achieved with current treatment? yes    CHARLETTE-7:  No data recorded    Activities of Daily Living:   Is your overall impression that this patient is benefiting (symptom reduction outweighs side effects) from benzodiazepine therapy? Yes     1. Physical Functioning: Same  2. Family Relationship: Same  3. Social Relationship: Same  4. Mood: Same  5. Sleep Patterns: Same  6. Overall Function: Same  Past Medical History  Past Medical History:   Diagnosis Date    Epilepsy     Generalized anxiety disorder        Social History  Social History     Tobacco Use    Smoking status: Former     Current packs/day: 0.00     Types: Cigarettes     Quit date: 1990     Years since quittin.9    Smokeless tobacco: Never   Vaping Use    Vaping status: Never Used   Substance Use Topics    Alcohol use: Not Currently    Drug use: Never       Family History     Family History   Problem Relation Name Age of Onset    Lung cancer Mother      Cancer Mother      Coronary artery disease Father      Heart attack Father      Skin cancer Son         Allergies:  Allergies   Allergen Reactions    Beta-Blockers (Beta-Adrenergic Blocking Agts) Unknown     DOES NOT REMEMBER    Rosuvastatin Rash and Swelling      severe muscle pain        Outpatient Medications:  Current Outpatient Medications   Medication Instructions    clonazePAM (KLONOPIN) 0.5 mg, oral, 2 times daily    clopidogrel (PLAVIX) 75 mg, oral, Daily RT    dicyclomine (BENTYL) 20 mg, oral, 4 times daily PRN    Dilantin Extended 100 mg, oral, 3 times daily    ezetimibe (ZETIA) 10 mg, oral, Daily    furosemide (LASIX) 40 mg, oral, Daily    HYDROcodone-acetaminophen (Norco) 5-325 mg tablet 1 tablet, oral, Every 6 hours PRN    isosorbide mononitrate ER (IMDUR) 60 mg, oral, Daily, Do not crush or chew.    losartan (COZAAR) 25 mg, oral, Daily    magnesium oxide (Mag-Ox) 400 mg (241.3 mg magnesium) tablet 1 tablet, oral, Daily    nitroglycerin (Nitrostat) 0.4 mg SL tablet Dissolve 1 tablet under the tongue every 5 minutes for up to 3 doses as needed for chest pain. If pain persists, diall 911 or go to ER.    simvastatin (Zocor) 40 mg tablet TAKE 1 TABLET BY MOUTH EVERY NIGHT AT BEDTIME    Xarelto 20 mg, oral, Daily, Take with food.        Review of Systems   Constitutional: Negative.         Wt gain   Respiratory: Negative.     Cardiovascular: Negative.    Gastrointestinal:  Negative for abdominal pain, diarrhea and nausea.   Musculoskeletal:  Positive for arthralgias.   Skin:  Positive for wound.   Neurological: Negative.          Objective       Physical Exam  Vitals reviewed.   Constitutional:       Appearance: Normal appearance. He is normal weight.   HENT:      Head: Normocephalic.   Eyes:      Conjunctiva/sclera: Conjunctivae normal.   Cardiovascular:      Rate and Rhythm: Normal rate and regular rhythm.      Pulses: Normal pulses.   Pulmonary:      Effort: Pulmonary effort is normal.      Breath sounds: Normal breath sounds.   Abdominal:      Palpations: Abdomen is soft.   Musculoskeletal:         General: Normal range of motion.      Cervical back: Neck supple.   Skin:     General: Skin is warm and dry.      Findings: Wound present.   Neurological:       "General: No focal deficit present.   Psychiatric:         Mood and Affect: Mood normal.       /80 (BP Location: Right arm, Patient Position: Sitting, BP Cuff Size: Adult)   Pulse 67   Temp 36.4 °C (97.5 °F) (Temporal)   Ht 1.702 m (5' 7\")   Wt 84.4 kg (186 lb)   BMI 29.13 kg/m²      Assessment/Plan   Problem List Items Addressed This Visit       CAD S/P percutaneous coronary angioplasty - Primary    Benign essential hypertension    Epilepsy    Generalized anxiety disorder    Pulmonary embolism   OARRS reviewed, UDS has been done on annual basis. Rx patten and usage reviewed, adverse reactions to controlled substance and risk of habituality and addiction and overdose and death explained, pt has been explained about [facility] controlled substance policy and mandates and follow ups. Controlled substance agreement has been reviewed and renewed annually. Please keep bottles containing controlled substance medications away from children and in safe area, do not share this Rx to anyone, noncompliance will lead to cessation of therapy. So far patient has been doing well and shows compliance.  Clonazepam prescription to be continued, urine drug screening was appropriate, he is compliant with the prescription refill, as I told him that it was a tiny pulmonary embolism so Xarelto therapy to be continued till end of January, there is a heightened risk of ongoing indefinite treatment of Xarelto with clopidogrel as pulmonary embolism was very tiny, no chest pain, patient has multiple PCI's, he gained weight, after gallbladder surgery he gained weight, he has a squamous cell carcinoma removed from the forehead, there is a wound, he is going to have another surgery to remove the tumor in toto.  No epilepsy, remains on Dilantin, anxiety has been much under control, it is time for him to have a PSA.  No angina, he appears to be much more calmer, he just turned 79, he was reassured about his therapeutics, he was " reassured about his care.  Follow-up in 3 months.

## 2024-11-19 ENCOUNTER — PHARMACY VISIT (OUTPATIENT)
Dept: PHARMACY | Facility: CLINIC | Age: 79
End: 2024-11-19
Payer: COMMERCIAL

## 2024-11-20 ENCOUNTER — APPOINTMENT (OUTPATIENT)
Dept: DERMATOLOGY | Facility: CLINIC | Age: 79
End: 2024-11-20
Payer: COMMERCIAL

## 2024-11-20 VITALS — DIASTOLIC BLOOD PRESSURE: 76 MMHG | SYSTOLIC BLOOD PRESSURE: 161 MMHG | HEART RATE: 75 BPM

## 2024-11-20 DIAGNOSIS — C44.92 SQUAMOUS CELL CARCINOMA OF SKIN: ICD-10-CM

## 2024-11-20 NOTE — PROGRESS NOTES
Mohs Surgery Operative Note    Date of Surgery:  11/20/2024  Surgeon:  Dagoberto Mckeon MD PhD  Office Location: [address] [address]  Dept: [phone]  Dept Fax: [phone]  Referring Provider: Veronica Sampson MD  [address] [address]      Assessment/Plan   Pre-procedure:   Obtained informed consent: written from patient  The surgical site was identified and confirmed with the patient.     Intra-operative:   Audible time out called at : 11:00 AM 11/20/24  by: Katelynn Helms RN   Verified patient name, birthdate, site, specimen bottle label & requisition.    The planned procedure(s) was again reviewed with the patient. The risks of bleeding, infection, nerve damage and scarring were reviewed. Written authorization was obtained. The patient identity, surgical site, and planned procedure(s) were verified. The provider acted as both surgeon and pathologist.     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    Mohs surgery    Consent obtained: written    Universal Protocol:  Procedure explained and questions answered to patient or proxy's satisfaction: Yes    Test results available and properly labeled: Yes    Pathology report reviewed: Yes    External notes reviewed: Yes    Photo or diagram used for site identification: Yes    Site/side marked: Yes    Slide independently reviewed by Mohs surgeon: Yes    Immediately prior to procedure a time out was called: Yes    Patient identity confirmed: verbally with patient  Preparation: Patient was prepped and draped in usual sterile fashion      Anticoagulation:  Is the patient taking prescription anticoagulant and/or aspirin prescribed/recommended by a physician? Yes (plavix and xarelto)    Was the anticoagulation regimen changed prior to Mohs? No      Anesthesia:  Anesthesia method: local infiltration  Local anesthetic: lidocaine 1% WITH epi    Procedure Details:  Case ID Number:  [id number]  Biopsy accession number: [id number]  Date of biopsy: 9/18/2024  Frozen section biopsy performed: No    Specimen debulked: Yes (moderately differentiated invasive SCC)    Pre-Op diagnosis: squamous cell carcinoma  Other pre-op diagnosis: invasive  SCC subtype: moderately differentiated  Surgical site (from skin exam): Superior Mid Forehead  Pre-operative length (cm): 2.7  Pre-operative width (cm): 2.3  Indications for Mohs surgery: anatomic location where tissue conservation is critical  Previously treated? No      Micrographic Surgery Details:  Post-operative length (cm): 2.9  Post-operative width (cm): 2.5  Number of Mohs stages: 1    Stage 1     Comments: The patient was brought into the operating room and placed in the procedure chair in the appropriate position.  The area positive by previous biopsy was identified and confirmed with the patient. The area of clinically obvious tumor was debulked using a curette and/or scalpel as needed. An incision was made following the Mohs approach through the skin. The specimen was taken to the lab, divided into 4 piece(s) and appropriately chromacoded and processed.       Tumor features identified on Mohs section: no tumor identified    Depth of defect: bone    Patient tolerance of procedure: tolerated well, no immediate complications    Reconstruction:  Was the defect reconstructed? Yes    Was reconstruction performed by the same Mohs surgeon? Yes    Setting of reconstruction: outpatient office  When was reconstruction performed? same day  Type of reconstruction: linear  Linear reconstruction: complex  Length of linear repair (cm): 8  Subcutaneous Layers (Deep Stitches)   Suture size:  3-0  Suture type:  Vicryl  Stitches:  Buried vertical mattress  Fine/surface layer approximation (top stitches)   Epidermal/Superficial suture size:  3-0 and 4-0  Epidermal/Superficial suture type:  Prolene  Stitches: simple running    Suture removal (days):  14  Hemostasis achieved  with: electrodesiccation  Outcome: patient tolerated procedure well with no complications    Post-procedure details: sterile dressing applied and wound care instructions given    Dressing type: Hypafix, pressure dressing, petrolatum and Telfa pad      Complex Linear Repair - Wide Undermining:  Given the location and size of the defect, it was determined that a complex layered linear closure was required to restore normal anatomy and function. The repair was considered complex because extensive undermining was required and performed. The amount of undermining performed was greater than the maximum width of the defect as measured perpendicular to the closure line along at least one entire edge of the defect. Standing cutaneous cones were removed using Burow's triangles. The wound edges were brought into close approximation with buried vertical mattress sutures. The remainder of the wound was then closed with epidermal top sutures.        The final repair measured 8 cm    Wound care was discussed, and the patient was given written post-operative wound care instructions.      The patient will follow up with Dagoberto Mckeon MD PhD as needed for any post operative problems or concerns, and will follow up with their primary dermatologist as scheduled.

## 2024-11-20 NOTE — LETTER
[facility] Provider/Referral Letter Treatment Plan    Patient: Lalo Antony   YOB: 1945   Date of Visit: 11/20/2024   MRN: [id number]     Veronica Sampson MD  [address] [address]    Dear Veronica Sampson MD,   I had the pleasure of seeing Lalo Antony today in consultation at your request for evaluation and treatment of:  1. 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  After informed consent was obtained, the patient underwent the procedure without complication.    The skin cancer was removed, wound care instructions were given and the patient was advised to follow up with you.  I will see the patient post-operatively as indicated.    Thank you very much for your confidence in me and for allowing me to share in the care of this patient.    1. 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  Is a 2.7 x 2.3 cm scar                          Mohs surgery    Consent obtained: written    Universal Protocol:  Procedure explained and questions answered to patient or proxy's satisfaction: Yes    Test results available and properly labeled: Yes    Pathology report reviewed: Yes    External notes reviewed: Yes    Photo or diagram used for site identification: Yes    Site/side marked: Yes    Slide independently reviewed by Mohs surgeon: Yes    Immediately prior to procedure a time out was called: Yes    Patient identity confirmed: verbally with patient  Preparation: Patient was prepped and draped in usual sterile fashion      Anticoagulation:  Is the patient taking prescription anticoagulant and/or aspirin prescribed/recommended by a physician? Yes (plavix and xarelto)    Was the anticoagulation regimen changed prior to Mohs? No       Anesthesia:  Anesthesia method: local infiltration  Local anesthetic: lidocaine 1% WITH epi    Procedure Details:  Case ID Number: [id number]  Biopsy accession number: [id number]  Date of biopsy: 9/18/2024  Frozen section biopsy performed: No    Specimen debulked: Yes (moderately differentiated invasive SCC)    Pre-Op diagnosis: squamous cell carcinoma  Other pre-op diagnosis: invasive  SCC subtype: moderately differentiated  Surgical site (from skin exam): Superior Mid Forehead  Pre-operative length (cm): 2.7  Pre-operative width (cm): 2.3  Indications for Mohs surgery: anatomic location where tissue conservation is critical  Previously treated? No      Micrographic Surgery Details:  Post-operative length (cm): 2.9  Post-operative width (cm): 2.5  Number of Mohs stages: 1    Stage 1     Comments: The patient was brought into the operating room and placed in the procedure chair in the appropriate position.  The area positive by previous biopsy was identified and confirmed with the patient. The area of clinically obvious tumor was debulked using a curette and/or scalpel as needed. An incision was made following the Mohs approach through the skin. The specimen was taken to the lab, divided into 4 piece(s) and appropriately chromacoded and processed.                 Tumor features identified on Mohs section: no tumor identified    Depth of defect: bone    Patient tolerance of procedure: tolerated well, no immediate complications    Reconstruction:  Was the defect reconstructed? Yes    Was reconstruction performed by the same Mohs surgeon? Yes    Setting of reconstruction: outpatient office  When was reconstruction performed? same day  Type of reconstruction: linear  Linear reconstruction: complex  Length of linear repair (cm): 8  Subcutaneous Layers (Deep Stitches)   Suture size:  3-0  Suture type:  Vicryl  Stitches:  Buried vertical mattress  Fine/surface layer approximation (top stitches)   Epidermal/Superficial  suture size:  3-0 and 4-0  Epidermal/Superficial suture type:  Prolene  Stitches: simple running    Suture removal (days):  14  Hemostasis achieved with: electrodesiccation  Outcome: patient tolerated procedure well with no complications    Post-procedure details: sterile dressing applied and wound care instructions given    Dressing type: Hypafix, pressure dressing, petrolatum and Telfa pad      Staff Communication: Dermatology Local Anesthesia: 1 % Lidocaine / Epinephrine - Amount: 18cc           Sincerely,       Dagoberto Mckeon MD PhD  [facility]

## 2024-11-20 NOTE — PROGRESS NOTES
Office Visit Note  Date: 11/20/2024  Surgeon:  Dagoberto Mckeon MD PhD  Office Location: [address] [address]  Dept: [phone]  Dept Fax: [phone]  Referring Provider: Veronica Sampson MD  [address] [address]    [facility]   Lalo Antony is a 79 y.o. male who presents for the following: MOHS Surgery (Superior mid forehead, SCC)    According to the patient, the lesion has been present for approximately 6 months at the time of diagnosis.  The lesion is itchy and painful.  The lesion has not been treated previously.    The patient does not have a pacemaker / defibrillator.  The patient does not have a heart valve / joint replacement.    The patient is on blood thinners.  The patient does not have a history of hepatitis B or C.  The patient does not have a history of HIV.  The patient does not have a history of immunosuppression (e.g. organ transplantation, malignancy, medications)    Review of Systems:  No other skin or systemic complaints other than what is documented elsewhere in the note.    MEDICAL HISTORY: clinically relevant history including significant past medical history, medications and allergies was reviewed and documented in Epic.    Objective   Well appearing patient in no apparent distress; mood and affect are within normal limits.  Vital signs: See record.  Noted on the Superior Mid Forehead  Is a 2.7 x 2.3 cm scar              The patient confirmed the identified site.    Discussion:  The nature of the diagnosis was explained. The lesion is a skin cancer.  It has a risk of local growth and distant spread. The condition is associated with sun exposure.  Warning signs of non-melanoma skin cancer discussed. Patient was instructed to perform monthly self skin examination.  We recommended that the patient have regular full skin exams given an increased risk of subsequent skin cancers. The patient was instructed to  use sun protective behaviors including use of broad spectrum sunscreens and sun protective clothing to reduce risk of skin cancers.      Risks, benefits, side effects of Mohs surgery were discussed with patient and the patient voiced understanding.  It was explained that even though the cure rate of Mohs is very high it is not 100%. Risks of surgery including but not limited to bleeding, infection, numbness, nerve damage, and scar were reviewed.  Discussion included wound care requirements, activity restrictions, likely scar outcome and time to heal.     After Mohs surgery, the defect may need to be repaired surgically and the scar may be longer than the original lesion.  Reconstruction options, risks, and benefits were reviewed including second intention healing, linear repair (4-1 ratio was explained), local flaps, skin grafts, cartilage grafts and interpolation flaps (the need for multiple surgeries was explained). Possible outcomes were reviewed including likely scar appearance, failure of flap survival, infection, bleeding and the need for revision surgery.     The pathology was reviewed, the photograph was reviewed, and the referring physician's note was reviewed.    Patient elected for Mohs surgery.

## 2024-11-20 NOTE — LETTER
MOH's Provider/Referral Letter Treatment Plan    Patient: Lalo Antony   YOB: 1945   Date of Visit: 11/20/2024   MRN: [id number]     Veronica Sampson MD  [address] [address]    Dear Veronica Sampson MD,     I had the pleasure of seeing Lalo Antony today in consultation at your request for evaluation and treatment of:  1. 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  After informed consent was obtained, the patient underwent the procedure without complication.    The skin cancer was removed, wound care instructions were given and the patient was advised to follow up with you.  I will see the patient post-operatively as indicated.    Thank you very much for your confidence in me and for allowing me to share in the care of this patient.    1. 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  Is a 2.7 x 2.3 cm scar                          Mohs surgery    Consent obtained: written    Universal Protocol:  Procedure explained and questions answered to patient or proxy's satisfaction: Yes    Test results available and properly labeled: Yes    Pathology report reviewed: Yes    External notes reviewed: Yes    Photo or diagram used for site identification: Yes    Site/side marked: Yes    Slide independently reviewed by Mohs surgeon: Yes    Immediately prior to procedure a time out was called: Yes    Patient identity confirmed: verbally with patient  Preparation: Patient was prepped and draped in usual sterile fashion      Anticoagulation:  Is the patient taking prescription anticoagulant and/or aspirin prescribed/recommended by a physician? Yes (plavix and xarelto)    Was the anticoagulation regimen changed prior to Mohs? No       Anesthesia:  Anesthesia method: local infiltration  Local anesthetic: lidocaine 1% WITH epi    Procedure Details:  Case ID Number: [id number]  Biopsy accession number: [id number]  Date of biopsy: 9/18/2024  Frozen section biopsy performed: No    Specimen debulked: Yes (moderately differentiated invasive SCC)    Pre-Op diagnosis: squamous cell carcinoma  Other pre-op diagnosis: invasive  SCC subtype: moderately differentiated  Surgical site (from skin exam): Superior Mid Forehead  Pre-operative length (cm): 2.7  Pre-operative width (cm): 2.3  Indications for Mohs surgery: anatomic location where tissue conservation is critical  Previously treated? No      Micrographic Surgery Details:  Post-operative length (cm): 2.9  Post-operative width (cm): 2.5  Number of Mohs stages: 1    Stage 1     Comments: The patient was brought into the operating room and placed in the procedure chair in the appropriate position.  The area positive by previous biopsy was identified and confirmed with the patient. The area of clinically obvious tumor was debulked using a curette and/or scalpel as needed. An incision was made following the Mohs approach through the skin. The specimen was taken to the lab, divided into 4 piece(s) and appropriately chromacoded and processed.                 Tumor features identified on Mohs section: no tumor identified    Depth of defect: bone    Patient tolerance of procedure: tolerated well, no immediate complications    Reconstruction:  Was the defect reconstructed? Yes    Was reconstruction performed by the same Mohs surgeon? Yes    Setting of reconstruction: outpatient office  When was reconstruction performed? same day  Type of reconstruction: linear  Linear reconstruction: complex  Length of linear repair (cm): 8  Subcutaneous Layers (Deep Stitches)   Suture size:  3-0  Suture type:  Vicryl  Stitches:  Buried vertical mattress  Fine/surface layer approximation (top stitches)   Epidermal/Superficial  suture size:  3-0 and 4-0  Epidermal/Superficial suture type:  Prolene  Stitches: simple running    Suture removal (days):  14  Hemostasis achieved with: electrodesiccation  Outcome: patient tolerated procedure well with no complications    Post-procedure details: sterile dressing applied and wound care instructions given    Dressing type: Hypafix, pressure dressing, petrolatum and Telfa pad      Staff Communication: Dermatology Local Anesthesia: 1 % Lidocaine / Epinephrine - Amount: 18cc           Sincerely,       Dagoberto Mckeon MD PhD  [facility]

## 2024-12-04 ENCOUNTER — APPOINTMENT (OUTPATIENT)
Dept: SURGERY | Facility: CLINIC | Age: 79
End: 2024-12-04
Payer: COMMERCIAL

## 2024-12-04 DIAGNOSIS — C44.91 MALIGNANT BASAL CELL NEOPLASM OF SKIN: Primary | ICD-10-CM

## 2024-12-04 PROCEDURE — 99213 OFFICE O/P EST LOW 20 MIN: CPT | Performed by: SURGERY

## 2024-12-04 ASSESSMENT — ENCOUNTER SYMPTOMS
WOUND: 1
COLOR CHANGE: 1

## 2024-12-04 NOTE — PROGRESS NOTES
Subjective   Patient ID: Lalo Antony is a 79 y.o. male who presents for Suture / Staple Removal.  Suture / Staple Removal      79-year-old male known to us status post excision of squamous cell CVA of forehead and family request suture removal locally denies any complaints at this time  Review of Systems   Skin:  Positive for color change and wound.   All other systems reviewed and are negative.      Objective   Physical Exam  Skin:     Comments: Incision healthy sutures removed without any bleeding or gapping       Physical Exam  Constitutional:       Appearance: Normal appearance.   HENT:      Head: Normocephalic and atraumatic.      Mouth/Throat:      Pharynx: Oropharynx is clear.   Eyes:      Pupils: Pupils are equal, round, and reactive to light.   Cardiovascular:      Rate and Rhythm: Normal rate and regular rhythm.   Pulmonary:      Effort: Pulmonary effort is normal.      Breath sounds: Normal breath sounds.   Abdominal:      General: Abdomen is flat. Bowel sounds are normal.      Palpations: Abdomen is soft.   Musculoskeletal:         General: Normal range of motion.      Cervical back: Normal range of motion.   Skin:     General: Skin is warm.   Neurological:      General: No focal deficit present.      Mental Status: She is alert. Mental status is at baseline.   Psychiatric:         Mood and Affect: Mood normal.     Assessment/Plan   Sutures removed will follow-up with dermatology for pathology CS as needed       Solis Cade MD 12/04/24 3:55 PM

## 2024-12-05 ENCOUNTER — PHARMACY VISIT (OUTPATIENT)
Dept: PHARMACY | Facility: CLINIC | Age: 79
End: 2024-12-05
Payer: COMMERCIAL

## 2024-12-05 DIAGNOSIS — E78.2 HYPERLIPIDEMIA, MIXED: ICD-10-CM

## 2024-12-05 PROCEDURE — RXMED WILLOW AMBULATORY MEDICATION CHARGE

## 2024-12-05 RX ORDER — SIMVASTATIN 40 MG/1
40 TABLET, FILM COATED ORAL NIGHTLY
Qty: 90 TABLET | Refills: 3 | Status: SHIPPED | OUTPATIENT
Start: 2024-12-05 | End: 2025-12-05

## 2024-12-05 RX ORDER — EZETIMIBE 10 MG/1
10 TABLET ORAL DAILY
Qty: 90 TABLET | Refills: 3 | Status: SHIPPED | OUTPATIENT
Start: 2024-12-05

## 2024-12-05 NOTE — TELEPHONE ENCOUNTER
Received request for prescription refills for patient.   Patient follows with Dr. Jeanmarie Cooper M.D.      Request is for refill  Is patient currently on medication yes    Last OV 9/3/24  Next OV 3/4/25    Pended for signing and sent to provider

## 2025-01-02 DIAGNOSIS — F41.1 GENERALIZED ANXIETY DISORDER: ICD-10-CM

## 2025-01-02 PROCEDURE — RXMED WILLOW AMBULATORY MEDICATION CHARGE

## 2025-01-02 RX ORDER — CLONAZEPAM 0.5 MG/1
0.5 TABLET ORAL 2 TIMES DAILY
Qty: 60 TABLET | Refills: 0 | Status: SHIPPED | OUTPATIENT
Start: 2025-01-02 | End: 2025-02-02

## 2025-01-03 ENCOUNTER — PHARMACY VISIT (OUTPATIENT)
Dept: PHARMACY | Facility: CLINIC | Age: 80
End: 2025-01-03
Payer: COMMERCIAL

## 2025-01-06 ENCOUNTER — PHARMACY VISIT (OUTPATIENT)
Dept: PHARMACY | Facility: CLINIC | Age: 80
End: 2025-01-06
Payer: COMMERCIAL

## 2025-01-06 PROCEDURE — RXMED WILLOW AMBULATORY MEDICATION CHARGE

## 2025-01-27 LAB
ALBUMIN SERPL-MCNC: 4.7 G/DL (ref 3.6–5.1)
ALP SERPL-CCNC: 67 U/L (ref 35–144)
ALT SERPL-CCNC: 9 U/L (ref 9–46)
ANION GAP SERPL CALCULATED.4IONS-SCNC: 9 MMOL/L (CALC) (ref 7–17)
AST SERPL-CCNC: 13 U/L (ref 10–35)
BILIRUB SERPL-MCNC: 0.7 MG/DL (ref 0.2–1.2)
BUN SERPL-MCNC: 14 MG/DL (ref 7–25)
CALCIUM SERPL-MCNC: 9.8 MG/DL (ref 8.6–10.3)
CHLORIDE SERPL-SCNC: 105 MMOL/L (ref 98–110)
CHOLEST SERPL-MCNC: 178 MG/DL
CHOLEST/HDLC SERPL: 2.5 (CALC)
CO2 SERPL-SCNC: 28 MMOL/L (ref 20–32)
CREAT SERPL-MCNC: 0.95 MG/DL (ref 0.7–1.22)
EGFRCR SERPLBLD CKD-EPI 2021: 81 ML/MIN/1.73M2
GLUCOSE SERPL-MCNC: 122 MG/DL (ref 65–99)
HDLC SERPL-MCNC: 71 MG/DL
LDLC SERPL CALC-MCNC: 90 MG/DL (CALC)
NONHDLC SERPL-MCNC: 107 MG/DL (CALC)
POTASSIUM SERPL-SCNC: 5.5 MMOL/L (ref 3.5–5.3)
PROT SERPL-MCNC: 6.9 G/DL (ref 6.1–8.1)
SODIUM SERPL-SCNC: 142 MMOL/L (ref 135–146)
TRIGL SERPL-MCNC: 80 MG/DL

## 2025-01-31 ENCOUNTER — PHARMACY VISIT (OUTPATIENT)
Dept: PHARMACY | Facility: CLINIC | Age: 80
End: 2025-01-31
Payer: COMMERCIAL

## 2025-01-31 DIAGNOSIS — F41.1 GENERALIZED ANXIETY DISORDER: ICD-10-CM

## 2025-01-31 PROCEDURE — RXMED WILLOW AMBULATORY MEDICATION CHARGE

## 2025-01-31 RX ORDER — CLONAZEPAM 0.5 MG/1
0.5 TABLET ORAL 2 TIMES DAILY
Qty: 60 TABLET | Refills: 0 | Status: SHIPPED | OUTPATIENT
Start: 2025-01-31 | End: 2025-03-02

## 2025-02-05 ENCOUNTER — PHARMACY VISIT (OUTPATIENT)
Dept: PHARMACY | Facility: CLINIC | Age: 80
End: 2025-02-05
Payer: COMMERCIAL

## 2025-02-05 PROCEDURE — RXMED WILLOW AMBULATORY MEDICATION CHARGE

## 2025-02-06 ENCOUNTER — PHARMACY VISIT (OUTPATIENT)
Dept: PHARMACY | Facility: CLINIC | Age: 80
End: 2025-02-06
Payer: COMMERCIAL

## 2025-02-17 ENCOUNTER — PHARMACY VISIT (OUTPATIENT)
Dept: PHARMACY | Facility: CLINIC | Age: 80
End: 2025-02-17
Payer: COMMERCIAL

## 2025-02-17 DIAGNOSIS — G40.909 NONINTRACTABLE EPILEPSY WITHOUT STATUS EPILEPTICUS, UNSPECIFIED EPILEPSY TYPE (MULTI): ICD-10-CM

## 2025-02-17 PROCEDURE — RXMED WILLOW AMBULATORY MEDICATION CHARGE

## 2025-02-17 RX ORDER — PHENYTOIN SODIUM 100 MG/1
100 CAPSULE, EXTENDED RELEASE ORAL 3 TIMES DAILY
Qty: 270 CAPSULE | Refills: 1 | Status: SHIPPED | OUTPATIENT
Start: 2025-02-17

## 2025-02-18 ENCOUNTER — APPOINTMENT (OUTPATIENT)
Dept: PRIMARY CARE | Facility: CLINIC | Age: 80
End: 2025-02-18
Payer: COMMERCIAL

## 2025-02-18 ENCOUNTER — PHARMACY VISIT (OUTPATIENT)
Dept: PHARMACY | Facility: CLINIC | Age: 80
End: 2025-02-18
Payer: COMMERCIAL

## 2025-02-18 VITALS
DIASTOLIC BLOOD PRESSURE: 78 MMHG | HEIGHT: 64 IN | TEMPERATURE: 96.8 F | BODY MASS INDEX: 33.97 KG/M2 | SYSTOLIC BLOOD PRESSURE: 120 MMHG | HEART RATE: 67 BPM | WEIGHT: 199 LBS

## 2025-02-18 DIAGNOSIS — Z12.5 PROSTATE CANCER SCREENING: ICD-10-CM

## 2025-02-18 DIAGNOSIS — I10 BENIGN ESSENTIAL HYPERTENSION: ICD-10-CM

## 2025-02-18 DIAGNOSIS — E66.09 CLASS 1 OBESITY DUE TO EXCESS CALORIES WITH SERIOUS COMORBIDITY AND BODY MASS INDEX (BMI) OF 34.0 TO 34.9 IN ADULT: ICD-10-CM

## 2025-02-18 DIAGNOSIS — E11.59 TYPE 2 DIABETES MELLITUS WITH OTHER CIRCULATORY COMPLICATION, WITHOUT LONG-TERM CURRENT USE OF INSULIN: Primary | ICD-10-CM

## 2025-02-18 DIAGNOSIS — F41.1 GENERALIZED ANXIETY DISORDER: ICD-10-CM

## 2025-02-18 DIAGNOSIS — Z98.61 CAD S/P PERCUTANEOUS CORONARY ANGIOPLASTY: ICD-10-CM

## 2025-02-18 DIAGNOSIS — I25.10 CAD S/P PERCUTANEOUS CORONARY ANGIOPLASTY: ICD-10-CM

## 2025-02-18 DIAGNOSIS — G40.802 OTHER EPILEPSY WITHOUT STATUS EPILEPTICUS, NOT INTRACTABLE: ICD-10-CM

## 2025-02-18 DIAGNOSIS — E66.811 CLASS 1 OBESITY DUE TO EXCESS CALORIES WITH SERIOUS COMORBIDITY AND BODY MASS INDEX (BMI) OF 34.0 TO 34.9 IN ADULT: ICD-10-CM

## 2025-02-18 PROCEDURE — 99213 OFFICE O/P EST LOW 20 MIN: CPT | Performed by: INTERNAL MEDICINE

## 2025-02-18 PROCEDURE — 3078F DIAST BP <80 MM HG: CPT | Performed by: INTERNAL MEDICINE

## 2025-02-18 PROCEDURE — 1036F TOBACCO NON-USER: CPT | Performed by: INTERNAL MEDICINE

## 2025-02-18 PROCEDURE — 1160F RVW MEDS BY RX/DR IN RCRD: CPT | Performed by: INTERNAL MEDICINE

## 2025-02-18 PROCEDURE — 3074F SYST BP LT 130 MM HG: CPT | Performed by: INTERNAL MEDICINE

## 2025-02-18 PROCEDURE — 1159F MED LIST DOCD IN RCRD: CPT | Performed by: INTERNAL MEDICINE

## 2025-02-18 ASSESSMENT — ENCOUNTER SYMPTOMS
PSYCHIATRIC NEGATIVE: 1
DEPRESSION: 0
LOSS OF SENSATION IN FEET: 0
RESPIRATORY NEGATIVE: 1
WOUND: 0
OCCASIONAL FEELINGS OF UNSTEADINESS: 0
GASTROINTESTINAL NEGATIVE: 1
ARTHRALGIAS: 0
CARDIOVASCULAR NEGATIVE: 1
DIZZINESS: 0

## 2025-02-18 ASSESSMENT — PATIENT HEALTH QUESTIONNAIRE - PHQ9
1. LITTLE INTEREST OR PLEASURE IN DOING THINGS: NOT AT ALL
2. FEELING DOWN, DEPRESSED OR HOPELESS: NOT AT ALL
SUM OF ALL RESPONSES TO PHQ9 QUESTIONS 1 AND 2: 0

## 2025-02-18 ASSESSMENT — COLUMBIA-SUICIDE SEVERITY RATING SCALE - C-SSRS
2. HAVE YOU ACTUALLY HAD ANY THOUGHTS OF KILLING YOURSELF?: NO
1. IN THE PAST MONTH, HAVE YOU WISHED YOU WERE DEAD OR WISHED YOU COULD GO TO SLEEP AND NOT WAKE UP?: NO
6. HAVE YOU EVER DONE ANYTHING, STARTED TO DO ANYTHING, OR PREPARED TO DO ANYTHING TO END YOUR LIFE?: NO

## 2025-02-18 NOTE — PROGRESS NOTES
Subjective   Patient ID: Lalo Antony is a 79 y.o. male who presents for Follow-up (3 mo fu).  HPI  Heart Problem  This is a chronic problem. The current episode started more than 1 year ago. The problem occurs intermittently. The problem has been unchanged. Pertinent negatives include no abdominal pain, anorexia, arthralgias, change in bowel habit, chest pain, diaphoresis, fatigue, nausea, vomiting or weakness. The treatment provided significant relief.   Anxiety  Presents for follow-up visit. Patient reports no chest pain, confusion, decreased concentration, depressed mood, dizziness, dry mouth, excessive worry, nausea or shortness of breath. Symptoms occur occasionally. The quality of sleep is fair.   Hypertension  This is a chronic problem. The current episode started more than 1 year ago. The problem has been waxing and waning since onset. The problem is controlled. Associated symptoms include anxiety. Pertinent negatives include no chest pain or shortness of breath. Past treatments include beta blockers and calcium channel blockers. The current treatment provides moderate improvement. There are no compliance problems.  OARRS:  No data recorded  I have personally reviewed the OARRS report for Lalo Antony. I have considered the risks of abuse, dependence, addiction and diversion    Is the patient prescribed a combination of a benzodiazepine and opioid?  No    Last Urine Drug Screen / ordered today: No  Recent Results (from the past 8760 hours)   Benzodiazepine Confirmation, Urine    Collection Time: 09/03/24 11:09 AM   Result Value Ref Range    Clonazepam <25 <25 ng/mL    7-Aminoclonazepam 465 (H) <25 ng/mL    Alprazolam <25 <25 ng/mL    Alpha-Hydroxyalprazolam <25 <25 ng/mL    Midazolam <25 <25 ng/mL    Alpha-Hydroxymidazolam <25 <25 ng/mL    Chlordiazepoxide <25 <25 ng/mL    Diazepam <25 <25 ng/mL    Nordiazepam <25 <25 ng/mL    Temazepam <25 <25 ng/mL    Oxazepam <25 <25 ng/mL    Lorazepam <25 <25  ng/mL   Drug Screen, Urine With Reflex to Confirmation    Collection Time: 24 11:09 AM   Result Value Ref Range    Amphetamine Screen, Urine Presumptive Negative Presumptive Negative    Barbiturate Screen, Urine Presumptive Negative Presumptive Negative    Benzodiazepines Screen, Urine Presumptive Negative Presumptive Negative    Cannabinoid Screen, Urine Presumptive Negative Presumptive Negative    Cocaine Metabolite Screen, Urine Presumptive Negative Presumptive Negative    Fentanyl Screen, Urine Presumptive Negative Presumptive Negative    Opiate Screen, Urine Presumptive Negative Presumptive Negative    Oxycodone Screen, Urine Presumptive Negative Presumptive Negative    PCP Screen, Urine Presumptive Negative Presumptive Negative    Methadone Screen, Urine Presumptive Negative Presumptive Negative     Results are as expected.         Controlled Substance Agreement:  Date of the Last Agreement: 2024  Reviewed Controlled Substance Agreement including but not limited to the benefits, risks, and alternatives to treatment with a Controlled Substance medication(s).    Benzodiazepines:  What is the patient's goal of therapy? Controlled anxiety Dx  Is this being achieved with current treatment? yes    CHARLETTE-7:  No data recorded    Activities of Daily Living:   Is your overall impression that this patient is benefiting (symptom reduction outweighs side effects) from benzodiazepine therapy? Yes     1. Physical Functioning: Same  2. Family Relationship: Better  3. Social Relationship: Better  4. Mood: Better  5. Sleep Patterns: Better  6. Overall Function: Better  Past Medical History  Past Medical History:   Diagnosis Date    Epilepsy     Generalized anxiety disorder        Social History  Social History     Tobacco Use    Smoking status: Former     Current packs/day: 0.00     Types: Cigarettes     Quit date: 1990     Years since quittin.1    Smokeless tobacco: Never   Vaping Use    Vaping status: Never  Used   Substance Use Topics    Alcohol use: Not Currently    Drug use: Never       Family History     Family History   Problem Relation Name Age of Onset    Lung cancer Mother      Cancer Mother      Coronary artery disease Father      Heart attack Father      Skin cancer Son         Allergies:  Allergies   Allergen Reactions    Beta-Blockers (Beta-Adrenergic Blocking Agts) Unknown     DOES NOT REMEMBER    Rosuvastatin Rash and Swelling     severe muscle pain        Outpatient Medications:  Current Outpatient Medications   Medication Instructions    clonazePAM (KLONOPIN) 0.5 mg, oral, 2 times daily    clopidogrel (PLAVIX) 75 mg, oral, Daily RT    dicyclomine (BENTYL) 20 mg, oral, 4 times daily PRN    Dilantin Extended 100 mg, oral, 3 times daily    ezetimibe (ZETIA) 10 mg, oral, Daily    furosemide (LASIX) 40 mg, oral, Daily    isosorbide mononitrate ER (IMDUR) 60 mg, oral, Daily, Do not crush or chew.    losartan (COZAAR) 25 mg, oral, Daily    magnesium oxide (Mag-Ox) 400 mg (241.3 mg magnesium) tablet 1 tablet, oral, Daily    nitroglycerin (Nitrostat) 0.4 mg SL tablet Dissolve 1 tablet under the tongue every 5 minutes for up to 3 doses as needed for chest pain. If pain persists, diall 911 or go to ER.    simvastatin (Zocor) 40 mg tablet TAKE 1 TABLET BY MOUTH EVERY NIGHT AT BEDTIME    Xarelto 20 mg, oral, Daily, Take with food.        Review of Systems   Constitutional:         Wt gain   Respiratory: Negative.     Cardiovascular: Negative.    Gastrointestinal: Negative.    Musculoskeletal:  Negative for arthralgias.   Skin:  Negative for rash and wound.   Neurological:  Negative for dizziness.   Psychiatric/Behavioral: Negative.           Objective       Physical Exam  Vitals reviewed.   Constitutional:       Appearance: Normal appearance. He is obese.   HENT:      Head: Normocephalic.   Eyes:      Conjunctiva/sclera: Conjunctivae normal.   Cardiovascular:      Rate and Rhythm: Normal rate and regular rhythm.       "Pulses: Normal pulses.   Pulmonary:      Effort: Pulmonary effort is normal.      Breath sounds: Normal breath sounds.   Abdominal:      General: Abdomen is protuberant.      Palpations: Abdomen is soft.   Musculoskeletal:         General: Normal range of motion.      Cervical back: Neck supple.   Skin:     General: Skin is warm and dry.   Neurological:      General: No focal deficit present.   Psychiatric:         Mood and Affect: Mood normal.     /78 (BP Location: Left arm, Patient Position: Sitting, BP Cuff Size: Adult)   Pulse 67   Temp 36 °C (96.8 °F) (Temporal)   Ht 1.619 m (5' 3.75\")   Wt 90.3 kg (199 lb)   BMI 34.43 kg/m²      Assessment/Plan   Problem List Items Addressed This Visit       CAD S/P percutaneous coronary angioplasty    Benign essential hypertension    Epilepsy    Generalized anxiety disorder    Class 1 obesity due to excess calories with serious comorbidity and body mass index (BMI) of 34.0 to 34.9 in adult    Type 2 diabetes mellitus with other circulatory complication, without long-term current use of insulin - Primary   OARRS reviewed, UDS has been done on annual basis. Rx patten and usage reviewed, adverse reactions to controlled substance and risk of habituality and addiction and overdose and death explained, pt has been explained about [facility] controlled substance policy and mandates and follow ups. Controlled substance agreement has been reviewed and renewed annually. Please keep bottles containing controlled substance medications away from children and in safe area, do not share this Rx to anyone, noncompliance will lead to cessation of therapy. So far patient has been doing well and shows compliance.  Gaining weight after cholecystectomy, he feels great, no angina, he has been turned into obese category, clonazepam therapy is a longstanding treatment, Xarelto can be taken away it has been more than 6 months since pulmonary embolism happen, it does not require " chronic anticoagulation, nitrates has helped to resolve angina, he remains on Zetia.  He remains on clopidogrel.  No epilepsy.  His blood sugars has been stable, hemoglobin A1c will be checked periodically, annual PSA to be checked periodically.  BP readings are stable, anxiety is controlled.  Add hemoglobin A1c, microalbumin assay will be done, caution about weight gain, OARRS report has been reviewed on every renewal of clonazepam and dosage reduction is not possible because he gets intractable uncomfortable and anxious.  Follow-up in 3 months.  Controlled substance agreement is up today.

## 2025-02-19 LAB
EST. AVERAGE GLUCOSE BLD GHB EST-MCNC: 126 MG/DL
EST. AVERAGE GLUCOSE BLD GHB EST-SCNC: 7 MMOL/L
HBA1C MFR BLD: 6 % OF TOTAL HGB
PSA SERPL-MCNC: 1.8 NG/ML

## 2025-02-20 LAB
ALBUMIN/CREAT UR: 2 MG/G CREAT
CREAT UR-MCNC: 162 MG/DL (ref 20–320)
MICROALBUMIN UR-MCNC: 0.3 MG/DL

## 2025-02-27 DIAGNOSIS — F41.1 GENERALIZED ANXIETY DISORDER: ICD-10-CM

## 2025-02-27 PROCEDURE — RXMED WILLOW AMBULATORY MEDICATION CHARGE

## 2025-02-27 RX ORDER — CLONAZEPAM 0.5 MG/1
0.5 TABLET ORAL 2 TIMES DAILY
Qty: 60 TABLET | Refills: 0 | Status: SHIPPED | OUTPATIENT
Start: 2025-02-27 | End: 2025-03-30

## 2025-02-28 ENCOUNTER — PHARMACY VISIT (OUTPATIENT)
Dept: PHARMACY | Facility: CLINIC | Age: 80
End: 2025-02-28
Payer: COMMERCIAL

## 2025-03-03 PROCEDURE — RXMED WILLOW AMBULATORY MEDICATION CHARGE

## 2025-03-04 ENCOUNTER — PHARMACY VISIT (OUTPATIENT)
Dept: PHARMACY | Facility: CLINIC | Age: 80
End: 2025-03-04
Payer: COMMERCIAL

## 2025-03-04 ENCOUNTER — APPOINTMENT (OUTPATIENT)
Dept: CARDIOLOGY | Facility: CLINIC | Age: 80
End: 2025-03-04
Payer: COMMERCIAL

## 2025-03-04 VITALS
SYSTOLIC BLOOD PRESSURE: 132 MMHG | WEIGHT: 203.8 LBS | HEART RATE: 72 BPM | DIASTOLIC BLOOD PRESSURE: 70 MMHG | BODY MASS INDEX: 35.26 KG/M2

## 2025-03-04 DIAGNOSIS — E11.59 TYPE 2 DIABETES MELLITUS WITH OTHER CIRCULATORY COMPLICATION, WITHOUT LONG-TERM CURRENT USE OF INSULIN: ICD-10-CM

## 2025-03-04 DIAGNOSIS — Z98.61 CAD S/P PERCUTANEOUS CORONARY ANGIOPLASTY: ICD-10-CM

## 2025-03-04 DIAGNOSIS — Z87.891 FORMER SMOKER: ICD-10-CM

## 2025-03-04 DIAGNOSIS — I25.10 CAD S/P PERCUTANEOUS CORONARY ANGIOPLASTY: ICD-10-CM

## 2025-03-04 DIAGNOSIS — E78.2 HYPERLIPIDEMIA, MIXED: ICD-10-CM

## 2025-03-04 DIAGNOSIS — I10 BENIGN ESSENTIAL HYPERTENSION: ICD-10-CM

## 2025-03-04 DIAGNOSIS — I26.99 PULMONARY EMBOLISM, UNSPECIFIED CHRONICITY, UNSPECIFIED PULMONARY EMBOLISM TYPE, UNSPECIFIED WHETHER ACUTE COR PULMONALE PRESENT (MULTI): ICD-10-CM

## 2025-03-04 PROBLEM — E66.09 CLASS 1 OBESITY DUE TO EXCESS CALORIES WITH SERIOUS COMORBIDITY AND BODY MASS INDEX (BMI) OF 34.0 TO 34.9 IN ADULT: Status: RESOLVED | Noted: 2024-09-03 | Resolved: 2025-03-04

## 2025-03-04 PROBLEM — E66.811 CLASS 1 OBESITY DUE TO EXCESS CALORIES WITH SERIOUS COMORBIDITY AND BODY MASS INDEX (BMI) OF 34.0 TO 34.9 IN ADULT: Status: RESOLVED | Noted: 2024-09-03 | Resolved: 2025-03-04

## 2025-03-04 PROCEDURE — 1159F MED LIST DOCD IN RCRD: CPT | Performed by: INTERNAL MEDICINE

## 2025-03-04 PROCEDURE — 1036F TOBACCO NON-USER: CPT | Performed by: INTERNAL MEDICINE

## 2025-03-04 PROCEDURE — 3075F SYST BP GE 130 - 139MM HG: CPT | Performed by: INTERNAL MEDICINE

## 2025-03-04 PROCEDURE — 3078F DIAST BP <80 MM HG: CPT | Performed by: INTERNAL MEDICINE

## 2025-03-04 PROCEDURE — 99214 OFFICE O/P EST MOD 30 MIN: CPT | Performed by: INTERNAL MEDICINE

## 2025-03-04 NOTE — PROGRESS NOTES
Referred by Dr. Magdaleno ref. provider found provider found for   Chief Complaint   Patient presents with    Follow-up     6 month follow-up on CAD        Chief complaint:   Chief Complaint   Patient presents with    Follow-up     6 month follow-up on CAD        History of Present Illness  Lalo Antony is a 79 y.o. year old male patient doing well from a cardiac standpoint no complaint of symptoms of chest pain or shortness of breath.  Denies symptoms of chest pain patient syncope or presyncope.  He has history of deep vein thrombosis and pulmonary emboli and had a 6 months worth of anticoagulation.  He will be stopping the Xarelto soon.  Discussed with the patient we will continue medication call for any problems follow-up as scheduled    Past Medical History  Past Medical History:   Diagnosis Date    Epilepsy     Generalized anxiety disorder        Social History  Social History     Tobacco Use    Smoking status: Former     Current packs/day: 0.00     Types: Cigarettes     Quit date: 1990     Years since quittin.1    Smokeless tobacco: Never   Vaping Use    Vaping status: Never Used   Substance Use Topics    Alcohol use: Not Currently    Drug use: Never       Family History     Family History   Problem Relation Name Age of Onset    Lung cancer Mother      Cancer Mother      Coronary artery disease Father      Heart attack Father      Skin cancer Son         Review of Systems  As per HPI, all other systems reviewed and negative.    Allergies:  Allergies   Allergen Reactions    Beta-Blockers (Beta-Adrenergic Blocking Agts) Unknown     DOES NOT REMEMBER    Rosuvastatin Rash and Swelling     severe muscle pain        Outpatient Medications:  Current Outpatient Medications   Medication Instructions    clonazePAM (KLONOPIN) 0.5 mg, oral, 2 times daily    clopidogrel (PLAVIX) 75 mg, oral, Daily RT    Dilantin Extended 100 mg, oral, 3 times daily    ezetimibe (ZETIA) 10 mg, oral, Daily    furosemide (LASIX) 40 mg,  oral, Daily    isosorbide mononitrate ER (IMDUR) 60 mg, oral, Daily, Do not crush or chew.    losartan (COZAAR) 25 mg, oral, Daily    magnesium oxide (Mag-Ox) 400 mg (241.3 mg magnesium) tablet 1 tablet, Daily    nitroglycerin (Nitrostat) 0.4 mg SL tablet Dissolve 1 tablet under the tongue every 5 minutes for up to 3 doses as needed for chest pain. If pain persists, diall 911 or go to ER.    rivaroxaban (Xarelto) 20 mg tablet 1 tablet, Daily    simvastatin (Zocor) 40 mg tablet TAKE 1 TABLET BY MOUTH EVERY NIGHT AT BEDTIME         Vitals:  Vitals:    03/04/25 1507   BP: 132/70   Pulse: 72       Physical Exam:  Physical Exam  Constitutional:       Appearance: Normal appearance.   HENT:      Head: Normocephalic and atraumatic.   Eyes:      Extraocular Movements: Extraocular movements intact.      Pupils: Pupils are equal, round, and reactive to light.   Cardiovascular:      Rate and Rhythm: Normal rate and regular rhythm.      Pulses: Normal pulses.      Heart sounds: Normal heart sounds.   Pulmonary:      Effort: Pulmonary effort is normal.      Breath sounds: Normal breath sounds.   Abdominal:      General: Abdomen is flat.      Palpations: Abdomen is soft.   Musculoskeletal:      Right lower leg: No edema.      Left lower leg: No edema.   Skin:     General: Skin is warm and dry.   Neurological:      General: No focal deficit present.      Mental Status: He is alert and oriented to person, place, and time.             Assessment/Plan   Diagnoses and all orders for this visit:  CAD S/P percutaneous coronary angioplasty  Hyperlipidemia, mixed  Benign essential hypertension  Pulmonary embolism, unspecified chronicity, unspecified pulmonary embolism type, unspecified whether acute cor pulmonale present (Multi)  BMI 35.0-35.9,adult  Type 2 diabetes mellitus with other circulatory complication, without long-term current use of insulin  Former smoker          IRishi RN   am scribing for, and in the presence of   Jeanmarie Cooper MD FACC .    I, Dr. Jeanmarie Cooper MD [facility] , personally performed the services described in the documentation as scribed by Rishi Pichardo RN   in my presence, and confirm it is both accurate and complete.      MD REBECCA BearC  Interventional Cardiology  Thank you for allowing me to participate in the care of this patient. Please do not hesitate to contact me with any further questions or concerns.

## 2025-03-25 DIAGNOSIS — F41.1 GENERALIZED ANXIETY DISORDER: ICD-10-CM

## 2025-03-25 PROCEDURE — RXMED WILLOW AMBULATORY MEDICATION CHARGE

## 2025-03-25 RX ORDER — CLONAZEPAM 0.5 MG/1
0.5 TABLET ORAL 2 TIMES DAILY
Qty: 60 TABLET | Refills: 0 | Status: SHIPPED | OUTPATIENT
Start: 2025-03-25 | End: 2025-04-27

## 2025-03-28 ENCOUNTER — PHARMACY VISIT (OUTPATIENT)
Dept: PHARMACY | Facility: CLINIC | Age: 80
End: 2025-03-28
Payer: COMMERCIAL

## 2025-04-16 DIAGNOSIS — L30.4 INTERTRIGO: ICD-10-CM

## 2025-04-16 PROCEDURE — RXMED WILLOW AMBULATORY MEDICATION CHARGE

## 2025-04-16 RX ORDER — NYSTATIN 100000 [USP'U]/G
1 POWDER TOPICAL 2 TIMES DAILY
Qty: 30 G | Refills: 1 | Status: SHIPPED | OUTPATIENT
Start: 2025-04-16 | End: 2026-04-16

## 2025-04-24 DIAGNOSIS — F41.1 GENERALIZED ANXIETY DISORDER: ICD-10-CM

## 2025-04-24 PROCEDURE — RXMED WILLOW AMBULATORY MEDICATION CHARGE

## 2025-04-24 RX ORDER — CLONAZEPAM 0.5 MG/1
0.5 TABLET ORAL 2 TIMES DAILY
Qty: 60 TABLET | Refills: 0 | Status: SHIPPED | OUTPATIENT
Start: 2025-04-24 | End: 2025-05-25

## 2025-04-25 ENCOUNTER — PHARMACY VISIT (OUTPATIENT)
Dept: PHARMACY | Facility: CLINIC | Age: 80
End: 2025-04-25
Payer: COMMERCIAL

## 2025-04-28 DIAGNOSIS — F41.1 GENERALIZED ANXIETY DISORDER: ICD-10-CM

## 2025-04-28 RX ORDER — CLONAZEPAM 0.5 MG/1
0.5 TABLET ORAL 2 TIMES DAILY
Qty: 60 TABLET | Refills: 0 | Status: SHIPPED | OUTPATIENT
Start: 2025-04-28 | End: 2025-05-28

## 2025-05-15 PROCEDURE — RXMED WILLOW AMBULATORY MEDICATION CHARGE

## 2025-05-16 ENCOUNTER — PHARMACY VISIT (OUTPATIENT)
Dept: PHARMACY | Facility: CLINIC | Age: 80
End: 2025-05-16
Payer: COMMERCIAL

## 2025-05-19 ENCOUNTER — APPOINTMENT (OUTPATIENT)
Dept: PRIMARY CARE | Facility: CLINIC | Age: 80
End: 2025-05-19
Payer: COMMERCIAL

## 2025-05-19 VITALS
HEART RATE: 77 BPM | SYSTOLIC BLOOD PRESSURE: 123 MMHG | DIASTOLIC BLOOD PRESSURE: 80 MMHG | TEMPERATURE: 97.3 F | BODY MASS INDEX: 36.7 KG/M2 | HEIGHT: 64 IN | WEIGHT: 215 LBS

## 2025-05-19 DIAGNOSIS — E66.812 CLASS 2 SEVERE OBESITY DUE TO EXCESS CALORIES WITH SERIOUS COMORBIDITY AND BODY MASS INDEX (BMI) OF 37.0 TO 37.9 IN ADULT: ICD-10-CM

## 2025-05-19 DIAGNOSIS — I25.10 CAD S/P PERCUTANEOUS CORONARY ANGIOPLASTY: Primary | ICD-10-CM

## 2025-05-19 DIAGNOSIS — F41.1 GENERALIZED ANXIETY DISORDER: ICD-10-CM

## 2025-05-19 DIAGNOSIS — I10 BENIGN ESSENTIAL HYPERTENSION: ICD-10-CM

## 2025-05-19 DIAGNOSIS — E66.01 CLASS 2 SEVERE OBESITY DUE TO EXCESS CALORIES WITH SERIOUS COMORBIDITY AND BODY MASS INDEX (BMI) OF 37.0 TO 37.9 IN ADULT: ICD-10-CM

## 2025-05-19 DIAGNOSIS — Z98.61 CAD S/P PERCUTANEOUS CORONARY ANGIOPLASTY: Primary | ICD-10-CM

## 2025-05-19 DIAGNOSIS — E11.59 TYPE 2 DIABETES MELLITUS WITH OTHER CIRCULATORY COMPLICATION, WITHOUT LONG-TERM CURRENT USE OF INSULIN: ICD-10-CM

## 2025-05-19 PROCEDURE — 99213 OFFICE O/P EST LOW 20 MIN: CPT | Performed by: INTERNAL MEDICINE

## 2025-05-19 PROCEDURE — 1159F MED LIST DOCD IN RCRD: CPT | Performed by: INTERNAL MEDICINE

## 2025-05-19 PROCEDURE — RXMED WILLOW AMBULATORY MEDICATION CHARGE

## 2025-05-19 PROCEDURE — 3074F SYST BP LT 130 MM HG: CPT | Performed by: INTERNAL MEDICINE

## 2025-05-19 PROCEDURE — 1036F TOBACCO NON-USER: CPT | Performed by: INTERNAL MEDICINE

## 2025-05-19 PROCEDURE — 3079F DIAST BP 80-89 MM HG: CPT | Performed by: INTERNAL MEDICINE

## 2025-05-19 RX ORDER — CLONAZEPAM 0.5 MG/1
0.5 TABLET ORAL 2 TIMES DAILY
Qty: 60 TABLET | Refills: 0 | Status: SHIPPED | OUTPATIENT
Start: 2025-05-22 | End: 2025-06-21

## 2025-05-19 ASSESSMENT — ENCOUNTER SYMPTOMS
LOSS OF SENSATION IN FEET: 0
OCCASIONAL FEELINGS OF UNSTEADINESS: 0
DIZZINESS: 0
GASTROINTESTINAL NEGATIVE: 1
ARTHRALGIAS: 1
WEAKNESS: 0
CARDIOVASCULAR NEGATIVE: 1
RESPIRATORY NEGATIVE: 1
NERVOUS/ANXIOUS: 1
DEPRESSION: 0
CONSTITUTIONAL NEGATIVE: 1

## 2025-05-19 ASSESSMENT — PATIENT HEALTH QUESTIONNAIRE - PHQ9
2. FEELING DOWN, DEPRESSED OR HOPELESS: NOT AT ALL
SUM OF ALL RESPONSES TO PHQ9 QUESTIONS 1 AND 2: 0
1. LITTLE INTEREST OR PLEASURE IN DOING THINGS: NOT AT ALL

## 2025-05-19 NOTE — PROGRESS NOTES
Subjective   Patient ID: Lalo Antony is a 79 y.o. male who presents for Follow-up (3 mo fu).  HPI  Heart Problem  This is a chronic problem. The current episode started more than 1 year ago. The problem occurs intermittently. The problem has been unchanged. Pertinent negatives include no abdominal pain, anorexia, arthralgias, change in bowel habit, chest pain, diaphoresis, fatigue, nausea, vomiting or weakness. The treatment provided significant relief.   Anxiety  Presents for follow-up visit. Patient reports no chest pain, confusion, decreased concentration, depressed mood, dizziness, dry mouth, excessive worry, nausea or shortness of breath. Symptoms occur occasionally. The quality of sleep is fair.   Hypertension  This is a chronic problem. The current episode started more than 1 year ago. The problem has been waxing and waning since onset. The problem is controlled. Associated symptoms include anxiety. Pertinent negatives include no chest pain or shortness of breath. Past treatments include beta blockers and calcium channel blockers. The current treatment provides moderate improvement. There are no compliance problems.OARRS:  Lennox Lopez MD on 5/19/2025  2:35 PM  I have personally reviewed the OARRS report for Lalo Antony. I have considered the risks of abuse, dependence, addiction and diversion    Is the patient prescribed a combination of a benzodiazepine and opioid?  No    Last Urine Drug Screen / ordered today: Yes  Recent Results (from the past 8760 hours)   Benzodiazepine Confirmation, Urine    Collection Time: 09/03/24 11:09 AM   Result Value Ref Range    Clonazepam <25 <25 ng/mL    7-Aminoclonazepam 465 (H) <25 ng/mL    Alprazolam <25 <25 ng/mL    Alpha-Hydroxyalprazolam <25 <25 ng/mL    Midazolam <25 <25 ng/mL    Alpha-Hydroxymidazolam <25 <25 ng/mL    Chlordiazepoxide <25 <25 ng/mL    Diazepam <25 <25 ng/mL    Nordiazepam <25 <25 ng/mL    Temazepam <25 <25 ng/mL    Oxazepam <25 <25 ng/mL     Lorazepam <25 <25 ng/mL   Drug Screen, Urine With Reflex to Confirmation    Collection Time: 09/03/24 11:09 AM   Result Value Ref Range    Amphetamine Screen, Urine Presumptive Negative Presumptive Negative    Barbiturate Screen, Urine Presumptive Negative Presumptive Negative    Benzodiazepines Screen, Urine Presumptive Negative Presumptive Negative    Cannabinoid Screen, Urine Presumptive Negative Presumptive Negative    Cocaine Metabolite Screen, Urine Presumptive Negative Presumptive Negative    Fentanyl Screen, Urine Presumptive Negative Presumptive Negative    Opiate Screen, Urine Presumptive Negative Presumptive Negative    Oxycodone Screen, Urine Presumptive Negative Presumptive Negative    PCP Screen, Urine Presumptive Negative Presumptive Negative    Methadone Screen, Urine Presumptive Negative Presumptive Negative     Results are as expected.         Controlled Substance Agreement:  Date of the Last Agreement: 8/22/2024  Reviewed Controlled Substance Agreement including but not limited to the benefits, risks, and alternatives to treatment with a Controlled Substance medication(s).    Benzodiazepines:  What is the patient's goal of therapy? Less anxiety  Is this being achieved with current treatment? yes    CHARLETTE-7:  No data recorded    Activities of Daily Living:   Is your overall impression that this patient is benefiting (symptom reduction outweighs side effects) from benzodiazepine therapy? Yes     1. Physical Functioning: Better  2. Family Relationship: Better  3. Social Relationship: Better  4. Mood: Better  5. Sleep Patterns: Better  6. Overall Function: Better  Past Medical History  Medical History[1]    Social History  Social History[2]    Family History   Family History[3]    Allergies:  RX Allergies[4]     Outpatient Medications:  Current Outpatient Medications   Medication Instructions    [START ON 5/22/2025] clonazePAM (KLONOPIN) 0.5 mg, oral, 2 times daily    clopidogrel (PLAVIX) 75 mg,  "oral, Daily RT    Dilantin Extended 100 mg, oral, 3 times daily    ezetimibe (ZETIA) 10 mg, oral, Daily    furosemide (LASIX) 40 mg, oral, Daily    isosorbide mononitrate ER (IMDUR) 60 mg, oral, Daily, Do not crush or chew.    losartan (COZAAR) 25 mg, oral, Daily    magnesium oxide (Mag-Ox) 400 mg (241.3 mg magnesium) tablet 1 tablet, Daily    nitroglycerin (Nitrostat) 0.4 mg SL tablet Dissolve 1 tablet under the tongue every 5 minutes for up to 3 doses as needed for chest pain. If pain persists, diall 911 or go to ER.    nystatin (Mycostatin) 100,000 unit/gram powder 1 Application, Topical, 2 times daily    simvastatin (Zocor) 40 mg tablet TAKE 1 TABLET BY MOUTH EVERY NIGHT AT BEDTIME        Review of Systems   Constitutional: Negative.         Wt gain   Respiratory: Negative.     Cardiovascular: Negative.    Gastrointestinal: Negative.    Musculoskeletal:  Positive for arthralgias.   Neurological:  Negative for dizziness and weakness.   Psychiatric/Behavioral:  The patient is nervous/anxious.          Objective       Physical Exam  Vitals reviewed.   Constitutional:       Appearance: Normal appearance. He is obese.   HENT:      Head: Normocephalic.   Eyes:      Conjunctiva/sclera: Conjunctivae normal.   Cardiovascular:      Rate and Rhythm: Normal rate and regular rhythm.      Pulses: Normal pulses.   Pulmonary:      Effort: Pulmonary effort is normal.      Breath sounds: Normal breath sounds.   Abdominal:      General: Abdomen is protuberant.      Palpations: Abdomen is soft.   Musculoskeletal:         General: Normal range of motion.      Cervical back: Neck supple.   Skin:     General: Skin is warm and dry.   Neurological:      General: No focal deficit present.   Psychiatric:         Mood and Affect: Mood normal.       /80 (BP Location: Right arm, Patient Position: Sitting, BP Cuff Size: Adult)   Pulse 77   Temp 36.3 °C (97.3 °F) (Temporal)   Ht 1.619 m (5' 3.75\")   Wt 97.5 kg (215 lb)   BMI 37.19 " kg/m²      Assessment/Plan   Problem List Items Addressed This Visit       CAD S/P percutaneous coronary angioplasty - Primary    Benign essential hypertension    Generalized anxiety disorder    Relevant Medications    clonazePAM (KlonoPIN) 0.5 mg tablet (Start on 2025)    Class 2 severe obesity due to excess calories with serious comorbidity and body mass index (BMI) of 37.0 to 37.9 in adult    Type 2 diabetes mellitus with other circulatory complication, without long-term current use of insulin   Patient continued to do well, he gained weight, he is back to his routine, he has a multiple PCI's no angina, BP readings are stable, hemoglobin A1c is perfect, he is on chronic clonazepam treatment and that treatment has been continued, it is a twice a day clonazepam, it is a chronic benzodiazepine treatment, OARRS report has been reviewed on every renewal.  He is in compliance with this treatment.  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  He came by himself, there is no issues or concerns, he will continue current therapy including clopidogrel, Xarelto has been taken away, check LDL and chemistry next time, OARRS report has been reviewed on every clonazepam renewal and he is compliant with the urine drug screening.  Follow-up in 3 months.       [1]   Past Medical History:  Diagnosis Date    Epilepsy     Generalized anxiety disorder    [2]   Social History  Tobacco Use    Smoking status: Former     Current packs/day: 0.00     Types: Cigarettes     Quit date: 1990     Years since quittin.4    Smokeless tobacco: Never   Vaping Use    Vaping status: Never Used   Substance Use Topics    Alcohol use: Not Currently    Drug use: Never   [3]   Family History  Problem Relation Name Age of Onset    Lung cancer Mother      Cancer Mother      Coronary artery disease Father      Heart attack Father      Skin  cancer Son     [4]   Allergies  Allergen Reactions    Beta-Blockers (Beta-Adrenergic Blocking Agts) Unknown     DOES NOT REMEMBER    Rosuvastatin Rash and Swelling     severe muscle pain

## 2025-05-21 ENCOUNTER — PHARMACY VISIT (OUTPATIENT)
Dept: PHARMACY | Facility: CLINIC | Age: 80
End: 2025-05-21
Payer: COMMERCIAL

## 2025-06-05 PROCEDURE — RXMED WILLOW AMBULATORY MEDICATION CHARGE

## 2025-06-06 ENCOUNTER — PHARMACY VISIT (OUTPATIENT)
Dept: PHARMACY | Facility: CLINIC | Age: 80
End: 2025-06-06
Payer: COMMERCIAL

## 2025-06-23 DIAGNOSIS — F41.1 GENERALIZED ANXIETY DISORDER: ICD-10-CM

## 2025-06-23 PROCEDURE — RXMED WILLOW AMBULATORY MEDICATION CHARGE

## 2025-06-23 RX ORDER — CLONAZEPAM 0.5 MG/1
0.5 TABLET ORAL 2 TIMES DAILY
Qty: 60 TABLET | Refills: 0 | Status: SHIPPED | OUTPATIENT
Start: 2025-06-23 | End: 2025-07-24

## 2025-06-24 ENCOUNTER — PHARMACY VISIT (OUTPATIENT)
Dept: PHARMACY | Facility: CLINIC | Age: 80
End: 2025-06-24
Payer: COMMERCIAL

## 2025-07-10 LAB
ALBUMIN SERPL-MCNC: 4.3 G/DL (ref 3.6–5.1)
ALP SERPL-CCNC: 71 U/L (ref 35–144)
ALT SERPL-CCNC: 9 U/L (ref 9–46)
ANION GAP SERPL CALCULATED.4IONS-SCNC: 9 MMOL/L (CALC) (ref 7–17)
AST SERPL-CCNC: 13 U/L (ref 10–35)
BASOPHILS # BLD AUTO: 62 CELLS/UL (ref 0–200)
BASOPHILS NFR BLD AUTO: 1 %
BILIRUB SERPL-MCNC: 0.7 MG/DL (ref 0.2–1.2)
BUN SERPL-MCNC: 15 MG/DL (ref 7–25)
CALCIUM SERPL-MCNC: 9.5 MG/DL (ref 8.6–10.3)
CHLORIDE SERPL-SCNC: 104 MMOL/L (ref 98–110)
CHOLEST SERPL-MCNC: 184 MG/DL
CHOLEST/HDLC SERPL: 2.6 (CALC)
CO2 SERPL-SCNC: 28 MMOL/L (ref 20–32)
CREAT SERPL-MCNC: 0.91 MG/DL (ref 0.7–1.22)
EGFRCR SERPLBLD CKD-EPI 2021: 85 ML/MIN/1.73M2
EOSINOPHIL # BLD AUTO: 149 CELLS/UL (ref 15–500)
EOSINOPHIL NFR BLD AUTO: 2.4 %
ERYTHROCYTE [DISTWIDTH] IN BLOOD BY AUTOMATED COUNT: 13.1 % (ref 11–15)
GLUCOSE SERPL-MCNC: 128 MG/DL (ref 65–99)
HCT VFR BLD AUTO: 47.2 % (ref 38.5–50)
HDLC SERPL-MCNC: 70 MG/DL
HGB BLD-MCNC: 15.6 G/DL (ref 13.2–17.1)
LDLC SERPL CALC-MCNC: 94 MG/DL (CALC)
LYMPHOCYTES # BLD AUTO: 1240 CELLS/UL (ref 850–3900)
LYMPHOCYTES NFR BLD AUTO: 20 %
MCH RBC QN AUTO: 32.3 PG (ref 27–33)
MCHC RBC AUTO-ENTMCNC: 33.1 G/DL (ref 32–36)
MCV RBC AUTO: 97.7 FL (ref 80–100)
MONOCYTES # BLD AUTO: 713 CELLS/UL (ref 200–950)
MONOCYTES NFR BLD AUTO: 11.5 %
NEUTROPHILS # BLD AUTO: 4036 CELLS/UL (ref 1500–7800)
NEUTROPHILS NFR BLD AUTO: 65.1 %
NONHDLC SERPL-MCNC: 114 MG/DL (CALC)
PLATELET # BLD AUTO: 221 THOUSAND/UL (ref 140–400)
PMV BLD REES-ECKER: 10.2 FL (ref 7.5–12.5)
POTASSIUM SERPL-SCNC: 5 MMOL/L (ref 3.5–5.3)
PROT SERPL-MCNC: 7 G/DL (ref 6.1–8.1)
RBC # BLD AUTO: 4.83 MILLION/UL (ref 4.2–5.8)
SODIUM SERPL-SCNC: 141 MMOL/L (ref 135–146)
TRIGL SERPL-MCNC: 102 MG/DL
WBC # BLD AUTO: 6.2 THOUSAND/UL (ref 3.8–10.8)

## 2025-07-22 DIAGNOSIS — F41.1 GENERALIZED ANXIETY DISORDER: ICD-10-CM

## 2025-07-22 PROCEDURE — RXMED WILLOW AMBULATORY MEDICATION CHARGE

## 2025-07-22 RX ORDER — CLONAZEPAM 0.5 MG/1
0.5 TABLET ORAL 2 TIMES DAILY
Qty: 60 TABLET | Refills: 0 | Status: SHIPPED | OUTPATIENT
Start: 2025-07-22 | End: 2025-08-22

## 2025-07-23 ENCOUNTER — PHARMACY VISIT (OUTPATIENT)
Dept: PHARMACY | Facility: CLINIC | Age: 80
End: 2025-07-23
Payer: COMMERCIAL

## 2025-08-13 DIAGNOSIS — G40.909 NONINTRACTABLE EPILEPSY WITHOUT STATUS EPILEPTICUS, UNSPECIFIED EPILEPSY TYPE (MULTI): ICD-10-CM

## 2025-08-13 PROCEDURE — RXMED WILLOW AMBULATORY MEDICATION CHARGE

## 2025-08-13 RX ORDER — PHENYTOIN SODIUM 100 MG/1
100 CAPSULE, EXTENDED RELEASE ORAL 3 TIMES DAILY
Qty: 270 CAPSULE | Refills: 1 | Status: SHIPPED | OUTPATIENT
Start: 2025-08-13

## 2025-08-14 ENCOUNTER — PHARMACY VISIT (OUTPATIENT)
Dept: PHARMACY | Facility: CLINIC | Age: 80
End: 2025-08-14
Payer: COMMERCIAL

## 2025-08-20 ENCOUNTER — APPOINTMENT (OUTPATIENT)
Dept: PRIMARY CARE | Facility: CLINIC | Age: 80
End: 2025-08-20
Payer: COMMERCIAL

## 2025-08-20 VITALS
HEIGHT: 64 IN | WEIGHT: 219 LBS | BODY MASS INDEX: 37.39 KG/M2 | TEMPERATURE: 96.8 F | DIASTOLIC BLOOD PRESSURE: 78 MMHG | SYSTOLIC BLOOD PRESSURE: 122 MMHG | HEART RATE: 77 BPM

## 2025-08-20 DIAGNOSIS — Z98.61 CAD S/P PERCUTANEOUS CORONARY ANGIOPLASTY: Primary | ICD-10-CM

## 2025-08-20 DIAGNOSIS — R73.03 PREDIABETES: ICD-10-CM

## 2025-08-20 DIAGNOSIS — I25.10 CAD S/P PERCUTANEOUS CORONARY ANGIOPLASTY: Primary | ICD-10-CM

## 2025-08-20 DIAGNOSIS — F41.1 GENERALIZED ANXIETY DISORDER: ICD-10-CM

## 2025-08-20 DIAGNOSIS — Z79.899 DRUG THERAPY: ICD-10-CM

## 2025-08-20 DIAGNOSIS — I10 BENIGN ESSENTIAL HYPERTENSION: ICD-10-CM

## 2025-08-20 PROCEDURE — 1159F MED LIST DOCD IN RCRD: CPT | Performed by: INTERNAL MEDICINE

## 2025-08-20 PROCEDURE — 99214 OFFICE O/P EST MOD 30 MIN: CPT | Performed by: INTERNAL MEDICINE

## 2025-08-20 PROCEDURE — 1160F RVW MEDS BY RX/DR IN RCRD: CPT | Performed by: INTERNAL MEDICINE

## 2025-08-20 PROCEDURE — G8433 SCR FOR DEP NOT CPT DOC RSN: HCPCS | Performed by: INTERNAL MEDICINE

## 2025-08-20 PROCEDURE — RXMED WILLOW AMBULATORY MEDICATION CHARGE

## 2025-08-20 PROCEDURE — 3078F DIAST BP <80 MM HG: CPT | Performed by: INTERNAL MEDICINE

## 2025-08-20 PROCEDURE — 3074F SYST BP LT 130 MM HG: CPT | Performed by: INTERNAL MEDICINE

## 2025-08-20 RX ORDER — CLONAZEPAM 0.5 MG/1
0.5 TABLET ORAL 2 TIMES DAILY
Qty: 60 TABLET | Refills: 1 | Status: SHIPPED | OUTPATIENT
Start: 2025-08-20 | End: 2025-10-19

## 2025-08-20 ASSESSMENT — ENCOUNTER SYMPTOMS
WEAKNESS: 0
ARTHRALGIAS: 1
DIZZINESS: 0
CARDIOVASCULAR NEGATIVE: 1
DEPRESSION: 0
GASTROINTESTINAL NEGATIVE: 1
CONSTITUTIONAL NEGATIVE: 1
RESPIRATORY NEGATIVE: 1
OCCASIONAL FEELINGS OF UNSTEADINESS: 0
LOSS OF SENSATION IN FEET: 0

## 2025-08-20 ASSESSMENT — PATIENT HEALTH QUESTIONNAIRE - PHQ9
2. FEELING DOWN, DEPRESSED OR HOPELESS: NOT AT ALL
1. LITTLE INTEREST OR PLEASURE IN DOING THINGS: NOT AT ALL
SUM OF ALL RESPONSES TO PHQ9 QUESTIONS 1 AND 2: 0

## 2025-08-21 ENCOUNTER — PHARMACY VISIT (OUTPATIENT)
Dept: PHARMACY | Facility: CLINIC | Age: 80
End: 2025-08-21
Payer: COMMERCIAL

## 2025-08-25 LAB
1OH-MIDAZOLAM UR-MCNC: NEGATIVE NG/ML
7AMINOCLONAZEPAM UR-MCNC: 758 NG/ML
A-OH ALPRAZ UR-MCNC: NEGATIVE NG/ML
A-OH-TRIAZOLAM UR-MCNC: NEGATIVE NG/ML
AMPHETAMINES UR QL: NEGATIVE NG/ML
BARBITURATES UR QL: NEGATIVE NG/ML
BENZODIAZ UR QL: POSITIVE NG/ML
BZE UR QL: NEGATIVE NG/ML
CREAT UR-MCNC: 169.5 MG/DL
DRUG SCREEN COMMENT UR-IMP: ABNORMAL
FENTANYL UR QL SCN: NEGATIVE NG/ML
LORAZEPAM UR-MCNC: NEGATIVE NG/ML
METHADONE UR QL: NEGATIVE NG/ML
NORDIAZEPAM UR-MCNC: NEGATIVE NG/ML
OH-ETHYLFLURAZ UR-MCNC: NEGATIVE NG/ML
OPIATES UR QL: NEGATIVE NG/ML
OXAZEPAM UR-MCNC: NEGATIVE NG/ML
OXIDANTS UR QL: NEGATIVE MCG/ML
OXYCODONE UR QL: NEGATIVE NG/ML
PCP UR QL: NEGATIVE NG/ML
PH UR: 5.9 [PH] (ref 4.5–9)
QUEST NOTES AND COMMENTS: ABNORMAL
TEMAZEPAM UR-MCNC: NEGATIVE NG/ML
THC UR QL: NEGATIVE NG/ML

## 2025-09-02 DIAGNOSIS — R07.9 CHEST PAIN, UNSPECIFIED TYPE: ICD-10-CM

## 2025-09-02 PROCEDURE — RXMED WILLOW AMBULATORY MEDICATION CHARGE

## 2025-09-02 RX ORDER — ISOSORBIDE MONONITRATE 60 MG/1
60 TABLET, EXTENDED RELEASE ORAL DAILY
Qty: 90 TABLET | Refills: 3 | Status: SHIPPED | OUTPATIENT
Start: 2025-09-02 | End: 2026-09-02

## 2025-09-03 ENCOUNTER — PHARMACY VISIT (OUTPATIENT)
Dept: PHARMACY | Facility: CLINIC | Age: 80
End: 2025-09-03
Payer: COMMERCIAL

## 2025-09-03 PROCEDURE — RXMED WILLOW AMBULATORY MEDICATION CHARGE

## 2025-12-02 ENCOUNTER — APPOINTMENT (OUTPATIENT)
Dept: CARDIOLOGY | Facility: CLINIC | Age: 80
End: 2025-12-02
Payer: COMMERCIAL

## 2025-12-10 ENCOUNTER — APPOINTMENT (OUTPATIENT)
Dept: PRIMARY CARE | Facility: CLINIC | Age: 80
End: 2025-12-10
Payer: COMMERCIAL

## (undated) DEVICE — CATHETER, IV, ANGIOCATH, 14 G X 5.25 IN, FEP POLYMER

## (undated) DEVICE — ADHESIVE, SKIN, LIQUIBAND EXCEED

## (undated) DEVICE — SYRINGE, CONTROL, ANGIOGRAPHIC, FIXED MALE LUER, 10 CC

## (undated) DEVICE — DRAPE SHEET, UTILITY, 26 X 15, W/ TAPE, STERILE

## (undated) DEVICE — STRAP, VELCRO, BODY, 4 X 60IN, NS

## (undated) DEVICE — DRAPE, C-ARM IMAGE

## (undated) DEVICE — SOLUTION, IRRIGATION, USP, SODIUM CHLORIDE 0.9%, 3000 ML

## (undated) DEVICE — CUP, MEDICINE, GRADUATED, 2 OZ, PLASTIC, DISP, LF

## (undated) DEVICE — RETRIEVAL SYSTEM, MONARCH, 10MM DISP ENDOSCOPIC

## (undated) DEVICE — Device

## (undated) DEVICE — SHEAR, W/UNIPOLAR CAUTERY, ENDOSHEAR, 5 MM

## (undated) DEVICE — CATHETER, KUMAR CHOLANGIOGRAPHY, 19G NEEDLE

## (undated) DEVICE — SYRINGE, 30 CC, LUER LOCK

## (undated) DEVICE — DRAPE, SHEET, ENDOSCOPY, GENERAL, FENESTRATED, ARMBOARD COVER, 98 X 123.5 IN, DISPOSABLE, LF, STERILE

## (undated) DEVICE — TOWEL PACK 10-PK

## (undated) DEVICE — ENDO, PORT VERSASTEP 5MM

## (undated) DEVICE — SOLUTION KIT, ANTIFOG, 6CC, LF

## (undated) DEVICE — GOWN, SURGICAL, ROYAL SILK, LG, STERILE

## (undated) DEVICE — GLOVE, SURGICAL, PROTEXIS PI , 7.5, PF, LF

## (undated) DEVICE — DRAPE, SHEET, XL

## (undated) DEVICE — DRAPE, SHEET, THREE QUARTER, FAN FOLD, 57 X 77 IN

## (undated) DEVICE — TROCAR SYSTEM, BALLOON, KII GELPORT, 12 X 100MM

## (undated) DEVICE — GOWN, SURGICAL, ROYAL SILK, XL, STERILE

## (undated) DEVICE — SURGICEL POWDER

## (undated) DEVICE — PUMP, STRYKERFLOW 2 & HANDPIECE W/10FT. IRRIGATION TUBING

## (undated) DEVICE — MANIFOLD, 4 PORT NEPTUNE STANDARD

## (undated) DEVICE — CLIP, ENDO APPLIER LIGAMAX 5MM

## (undated) DEVICE — TRAY, DRY PREP, PREMIUM

## (undated) DEVICE — ELECTRODE, ELECTROSURGICAL, BLADE, INSULATED, ENT/IMA, STERILE

## (undated) DEVICE — SUTURE, VICRYL, 0, 27 IN, UR-6, VIOLET

## (undated) DEVICE — DRAPE, FLUID WARMING

## (undated) DEVICE — SOLUTION, CONTRAST, OMNIPAQUE 350, 100ML, POLYIMER BOTTLE

## (undated) DEVICE — NEEDLE, INSUFFLATION, 14 G, 100 MM

## (undated) DEVICE — STRAP, ARM BOARD, 32 X 1.5

## (undated) DEVICE — SOLUTION, SCRUB EXIDINE, 4% CHG, 8 OZ

## (undated) DEVICE — HOLSTER, JET SAFETY

## (undated) DEVICE — SUTURE, MONOCRYL, 4-0, 27 IN, PS-2, UNDYED

## (undated) DEVICE — ELECTRODE, ELECTROSURGICAL, LAPAROSCOPIC, L HOOK TIP, 36 IN